# Patient Record
Sex: MALE | Race: WHITE | NOT HISPANIC OR LATINO | Employment: OTHER | RURAL
[De-identification: names, ages, dates, MRNs, and addresses within clinical notes are randomized per-mention and may not be internally consistent; named-entity substitution may affect disease eponyms.]

---

## 2017-09-11 ENCOUNTER — HISTORICAL (OUTPATIENT)
Dept: ADMINISTRATIVE | Facility: HOSPITAL | Age: 77
End: 2017-09-11

## 2017-09-12 LAB
LAB AP CLINICAL INFORMATION: NORMAL
LAB AP COMMENTS: NORMAL
LAB AP DIAGNOSIS - HISTORICAL: NORMAL
LAB AP GROSS PATHOLOGY - HISTORICAL: NORMAL
LAB AP SPECIMEN SUBMITTED - HISTORICAL: NORMAL

## 2020-04-15 ENCOUNTER — HISTORICAL (OUTPATIENT)
Dept: ADMINISTRATIVE | Facility: HOSPITAL | Age: 80
End: 2020-04-15

## 2020-08-24 ENCOUNTER — HISTORICAL (OUTPATIENT)
Dept: ADMINISTRATIVE | Facility: HOSPITAL | Age: 80
End: 2020-08-24

## 2020-08-24 LAB
ANION GAP SERPL CALCULATED.3IONS-SCNC: 9 MMOL/L
ANISOCYTOSIS BLD QL SMEAR: ABNORMAL
APTT PPP: 24.4 SECONDS (ref 25.2–37.3)
BASOPHILS # BLD AUTO: 0.01 X10E3/UL (ref 0–0.2)
BASOPHILS NFR BLD AUTO: 0.2 % (ref 0–1)
BILIRUB UR QL STRIP: NEGATIVE MG/DL
BUN SERPL-MCNC: 28 MG/DL (ref 7–18)
CALCIUM SERPL-MCNC: 9.2 MG/DL (ref 8.5–10.1)
CHLORIDE SERPL-SCNC: 102 MMOL/L (ref 98–107)
CK MB SERPL-MCNC: 1.5 NG/ML (ref 1–3.6)
CK SERPL-CCNC: 73 U/L (ref 39–308)
CLARITY UR: CLEAR
CO2 SERPL-SCNC: 31 MMOL/L (ref 21–32)
COLOR UR: YELLOW
CREAT SERPL-MCNC: 1.39 MG/DL (ref 0.7–1.3)
CRP SERPL-MCNC: 0.5 UG/ML (ref 0–0.8)
CRYSTALS FLD MICRO: ABNORMAL
DACRYOCYTES BLD QL SMEAR: ABNORMAL
EOSINOPHIL # BLD AUTO: 0.16 X10E3/UL (ref 0–0.5)
EOSINOPHIL NFR BLD AUTO: 3.2 % (ref 1–4)
EOSINOPHIL NFR BLD MANUAL: 4 % (ref 1–4)
ERYTHROCYTE [DISTWIDTH] IN BLOOD BY AUTOMATED COUNT: 18.3 % (ref 11.5–14.5)
ERYTHROCYTE [SEDIMENTATION RATE] IN BLOOD BY WESTERGREN METHOD: 48 MM/HR (ref 0–20)
GLUCOSE SERPL-MCNC: 106 MG/DL (ref 74–106)
GLUCOSE UR STRIP-MCNC: NEGATIVE MG/DL
HCT VFR BLD AUTO: 32.5 % (ref 40–54)
HGB BLD-MCNC: 10.6 G/DL (ref 13.5–18)
IMM GRANULOCYTES # BLD AUTO: 0.01 X10E3/UL (ref 0–0.04)
IMM GRANULOCYTES NFR BLD: 0.2 % (ref 0–0.4)
INR BLD: 0.92 (ref 0–3.3)
KETONES UR STRIP-SCNC: NEGATIVE MG/DL
LEUKOCYTE ESTERASE UR QL STRIP: NEGATIVE LEU/UL
LYMPHOCYTES # BLD AUTO: 1.57 X10E3/UL (ref 1–4.8)
LYMPHOCYTES NFR BLD AUTO: 31.7 % (ref 27–41)
LYMPHOCYTES NFR BLD MANUAL: 29 % (ref 27–41)
MAGNESIUM SERPL-MCNC: 1.3 MG/DL (ref 1.7–2.3)
MCH RBC QN AUTO: 33.7 PG (ref 27–31)
MCHC RBC AUTO-ENTMCNC: 32.6 G/DL (ref 32–36)
MCV RBC AUTO: 103.2 FL (ref 80–96)
MONOCYTES # BLD AUTO: 0.44 X10E3/UL (ref 0–0.8)
MONOCYTES NFR BLD AUTO: 8.9 % (ref 2–6)
MONOCYTES NFR BLD MANUAL: 6 % (ref 2–6)
MPC BLD CALC-MCNC: 9.1 FL (ref 9.4–12.4)
MYOGLOBIN SERPL-MCNC: 100 NG/ML (ref 16–116)
NEUTROPHILS # BLD AUTO: 2.77 X10E3/UL (ref 1.8–7.7)
NEUTROPHILS NFR BLD AUTO: 55.8 % (ref 53–65)
NEUTS BAND NFR BLD MANUAL: 1 % (ref 1–5)
NEUTS SEG NFR BLD MANUAL: 60 % (ref 50–62)
NITRITE UR QL STRIP: NEGATIVE
NRBC # BLD AUTO: 0 X10E3/UL (ref 0–0)
NRBC, AUTO (.00): 0 /100 (ref 0–0)
OVALOCYTES BLD QL SMEAR: ABNORMAL
PH UR STRIP: 6 PH UNITS (ref 5–8)
PLATELET # BLD AUTO: 183 X10E3/UL (ref 150–400)
PLATELET MORPHOLOGY: NORMAL
POLYCHROMASIA BLD QL SMEAR: ABNORMAL
POTASSIUM SERPL-SCNC: 3.9 MMOL/L (ref 3.5–5.1)
PROT UR QL STRIP: ABNORMAL MG/DL
PROTHROMBIN TIME: 11.9 SECONDS (ref 11.7–14.7)
RBC # BLD AUTO: 3.15 X10E6/UL (ref 4.6–6.2)
RBC # UR STRIP: NEGATIVE ERY/UL
SODIUM SERPL-SCNC: 138 MMOL/L (ref 136–145)
SP GR UR STRIP: 1.02 (ref 1–1.03)
TROPONIN I SERPL-MCNC: <0.017 NG/ML (ref 0–0.06)
UROBILINOGEN UR STRIP-ACNC: 0.2 EU/DL
WBC # BLD AUTO: 4.96 X10E3/UL (ref 4.5–11)

## 2020-09-10 ENCOUNTER — HISTORICAL (OUTPATIENT)
Dept: ADMINISTRATIVE | Facility: HOSPITAL | Age: 80
End: 2020-09-10

## 2021-05-11 ENCOUNTER — OFFICE VISIT (OUTPATIENT)
Dept: FAMILY MEDICINE | Facility: CLINIC | Age: 81
End: 2021-05-11
Payer: MEDICARE

## 2021-05-11 VITALS
WEIGHT: 216.19 LBS | BODY MASS INDEX: 29.28 KG/M2 | TEMPERATURE: 98 F | SYSTOLIC BLOOD PRESSURE: 135 MMHG | DIASTOLIC BLOOD PRESSURE: 73 MMHG | RESPIRATION RATE: 16 BRPM | HEART RATE: 80 BPM | HEIGHT: 72 IN | OXYGEN SATURATION: 97 %

## 2021-05-11 DIAGNOSIS — H61.23 BILATERAL IMPACTED CERUMEN: ICD-10-CM

## 2021-05-11 DIAGNOSIS — H69.93 EUSTACHIAN TUBE DYSFUNCTION, BILATERAL: Primary | ICD-10-CM

## 2021-05-11 DIAGNOSIS — H91.93 DECREASED HEARING OF BOTH EARS: ICD-10-CM

## 2021-05-11 PROCEDURE — 99213 OFFICE O/P EST LOW 20 MIN: CPT | Mod: 25,,, | Performed by: NURSE PRACTITIONER

## 2021-05-11 PROCEDURE — 69209 REMOVE IMPACTED EAR WAX UNI: CPT | Mod: 50,,, | Performed by: NURSE PRACTITIONER

## 2021-05-11 PROCEDURE — 96372 THER/PROPH/DIAG INJ SC/IM: CPT | Mod: 59,,, | Performed by: NURSE PRACTITIONER

## 2021-05-11 PROCEDURE — 96372 PR INJECTION,THERAP/PROPH/DIAG2ST, IM OR SUBCUT: ICD-10-PCS | Mod: 59,,, | Performed by: NURSE PRACTITIONER

## 2021-05-11 PROCEDURE — 69209 PR REMOVAL IMPACTED CERUMEN USING IRRIGATION/LAVAGE, UNILATERAL: ICD-10-PCS | Mod: 50,,, | Performed by: NURSE PRACTITIONER

## 2021-05-11 PROCEDURE — 99213 PR OFFICE/OUTPT VISIT, EST, LEVL III, 20-29 MIN: ICD-10-PCS | Mod: 25,,, | Performed by: NURSE PRACTITIONER

## 2021-05-11 RX ORDER — METHYLPREDNISOLONE ACETATE 40 MG/ML
40 INJECTION, SUSPENSION INTRA-ARTICULAR; INTRALESIONAL; INTRAMUSCULAR; SOFT TISSUE ONCE
Status: COMPLETED | OUTPATIENT
Start: 2021-05-11 | End: 2021-05-11

## 2021-05-11 RX ORDER — OMEPRAZOLE 20 MG/1
20 CAPSULE, DELAYED RELEASE ORAL
COMMUNITY

## 2021-05-11 RX ORDER — ASPIRIN 81 MG/1
81 TABLET ORAL DAILY
COMMUNITY

## 2021-05-11 RX ORDER — DEXAMETHASONE SODIUM PHOSPHATE 4 MG/ML
4 INJECTION, SOLUTION INTRA-ARTICULAR; INTRALESIONAL; INTRAMUSCULAR; INTRAVENOUS; SOFT TISSUE ONCE
Status: COMPLETED | OUTPATIENT
Start: 2021-05-11 | End: 2021-05-11

## 2021-05-11 RX ORDER — ATORVASTATIN CALCIUM 40 MG/1
40 TABLET, FILM COATED ORAL NIGHTLY
COMMUNITY

## 2021-05-11 RX ADMIN — METHYLPREDNISOLONE ACETATE 40 MG: 40 INJECTION, SUSPENSION INTRA-ARTICULAR; INTRALESIONAL; INTRAMUSCULAR; SOFT TISSUE at 10:05

## 2021-05-11 RX ADMIN — DEXAMETHASONE SODIUM PHOSPHATE 4 MG: 4 INJECTION, SOLUTION INTRA-ARTICULAR; INTRALESIONAL; INTRAMUSCULAR; INTRAVENOUS; SOFT TISSUE at 10:05

## 2021-10-04 ENCOUNTER — OFFICE VISIT (OUTPATIENT)
Dept: FAMILY MEDICINE | Facility: CLINIC | Age: 81
End: 2021-10-04
Payer: MEDICARE

## 2021-10-04 VITALS
DIASTOLIC BLOOD PRESSURE: 70 MMHG | BODY MASS INDEX: 29.8 KG/M2 | WEIGHT: 220 LBS | RESPIRATION RATE: 18 BRPM | HEART RATE: 80 BPM | SYSTOLIC BLOOD PRESSURE: 130 MMHG | HEIGHT: 72 IN

## 2021-10-04 DIAGNOSIS — M65.339 TRIGGER MIDDLE FINGER, UNSPECIFIED LATERALITY: Primary | ICD-10-CM

## 2021-10-04 PROCEDURE — 99213 PR OFFICE/OUTPT VISIT, EST, LEVL III, 20-29 MIN: ICD-10-PCS | Mod: ,,, | Performed by: FAMILY MEDICINE

## 2021-10-04 PROCEDURE — 3075F SYST BP GE 130 - 139MM HG: CPT | Mod: ,,, | Performed by: FAMILY MEDICINE

## 2021-10-04 PROCEDURE — 3288F PR FALLS RISK ASSESSMENT DOCUMENTED: ICD-10-PCS | Mod: ,,, | Performed by: FAMILY MEDICINE

## 2021-10-04 PROCEDURE — 1160F PR REVIEW ALL MEDS BY PRESCRIBER/CLIN PHARMACIST DOCUMENTED: ICD-10-PCS | Mod: ,,, | Performed by: FAMILY MEDICINE

## 2021-10-04 PROCEDURE — 1159F PR MEDICATION LIST DOCUMENTED IN MEDICAL RECORD: ICD-10-PCS | Mod: ,,, | Performed by: FAMILY MEDICINE

## 2021-10-04 PROCEDURE — 99213 OFFICE O/P EST LOW 20 MIN: CPT | Mod: ,,, | Performed by: FAMILY MEDICINE

## 2021-10-04 PROCEDURE — 1159F MED LIST DOCD IN RCRD: CPT | Mod: ,,, | Performed by: FAMILY MEDICINE

## 2021-10-04 PROCEDURE — 1101F PR PT FALLS ASSESS DOC 0-1 FALLS W/OUT INJ PAST YR: ICD-10-PCS | Mod: ,,, | Performed by: FAMILY MEDICINE

## 2021-10-04 PROCEDURE — 3078F PR MOST RECENT DIASTOLIC BLOOD PRESSURE < 80 MM HG: ICD-10-PCS | Mod: ,,, | Performed by: FAMILY MEDICINE

## 2021-10-04 PROCEDURE — 1160F RVW MEDS BY RX/DR IN RCRD: CPT | Mod: ,,, | Performed by: FAMILY MEDICINE

## 2021-10-04 PROCEDURE — 3078F DIAST BP <80 MM HG: CPT | Mod: ,,, | Performed by: FAMILY MEDICINE

## 2021-10-04 PROCEDURE — 3075F PR MOST RECENT SYSTOLIC BLOOD PRESS GE 130-139MM HG: ICD-10-PCS | Mod: ,,, | Performed by: FAMILY MEDICINE

## 2021-10-04 PROCEDURE — 1101F PT FALLS ASSESS-DOCD LE1/YR: CPT | Mod: ,,, | Performed by: FAMILY MEDICINE

## 2021-10-04 PROCEDURE — 3288F FALL RISK ASSESSMENT DOCD: CPT | Mod: ,,, | Performed by: FAMILY MEDICINE

## 2021-10-11 ENCOUNTER — HOSPITAL ENCOUNTER (OUTPATIENT)
Dept: RADIOLOGY | Facility: HOSPITAL | Age: 81
Discharge: HOME | End: 2021-10-11
Attending: ORTHOPAEDIC SURGERY
Payer: MEDICARE

## 2021-10-11 DIAGNOSIS — M79.643 PAIN OF HAND, UNSPECIFIED LATERALITY: ICD-10-CM

## 2021-10-11 PROBLEM — M65.339 TRIGGER MIDDLE FINGER: Status: ACTIVE | Noted: 2021-10-11

## 2021-10-11 PROCEDURE — 73130 X-RAY EXAM OF HAND: CPT | Mod: TC,50

## 2021-11-04 ENCOUNTER — ANESTHESIA (OUTPATIENT)
Dept: SURGERY | Facility: HOSPITAL | Age: 81
End: 2021-11-04
Payer: MEDICARE

## 2021-11-04 ENCOUNTER — HOSPITAL ENCOUNTER (OUTPATIENT)
Facility: HOSPITAL | Age: 81
Discharge: HOME | End: 2021-11-04
Attending: ORTHOPAEDIC SURGERY | Admitting: ORTHOPAEDIC SURGERY
Payer: MEDICARE

## 2021-11-04 ENCOUNTER — ANESTHESIA EVENT (OUTPATIENT)
Dept: SURGERY | Facility: HOSPITAL | Age: 81
End: 2021-11-04
Payer: MEDICARE

## 2021-11-04 VITALS
HEART RATE: 68 BPM | DIASTOLIC BLOOD PRESSURE: 59 MMHG | RESPIRATION RATE: 20 BRPM | WEIGHT: 224 LBS | SYSTOLIC BLOOD PRESSURE: 121 MMHG | BODY MASS INDEX: 30.34 KG/M2 | OXYGEN SATURATION: 93 % | HEIGHT: 72 IN | TEMPERATURE: 98 F

## 2021-11-04 DIAGNOSIS — I10 HYPERTENSION: ICD-10-CM

## 2021-11-04 DIAGNOSIS — M65.331 TRIGGER FINGER, RIGHT MIDDLE FINGER: Primary | ICD-10-CM

## 2021-11-04 PROCEDURE — 36000707: Performed by: ORTHOPAEDIC SURGERY

## 2021-11-04 PROCEDURE — 37000009 HC ANESTHESIA EA ADD 15 MINS: Performed by: ORTHOPAEDIC SURGERY

## 2021-11-04 PROCEDURE — 36000706: Performed by: ORTHOPAEDIC SURGERY

## 2021-11-04 PROCEDURE — 27000655: Performed by: ANESTHESIOLOGY

## 2021-11-04 PROCEDURE — 27000284 HC CANNULA NASAL: Performed by: ANESTHESIOLOGY

## 2021-11-04 PROCEDURE — 71000015 HC POSTOP RECOV 1ST HR: Performed by: ORTHOPAEDIC SURGERY

## 2021-11-04 PROCEDURE — 27201423 OPTIME MED/SURG SUP & DEVICES STERILE SUPPLY: Performed by: ORTHOPAEDIC SURGERY

## 2021-11-04 PROCEDURE — D9220A PRA ANESTHESIA: ICD-10-PCS | Mod: ANES,,, | Performed by: ANESTHESIOLOGY

## 2021-11-04 PROCEDURE — 25000003 PHARM REV CODE 250: Performed by: ORTHOPAEDIC SURGERY

## 2021-11-04 PROCEDURE — 25000003 PHARM REV CODE 250: Performed by: NURSE ANESTHETIST, CERTIFIED REGISTERED

## 2021-11-04 PROCEDURE — 93010 ELECTROCARDIOGRAM REPORT: CPT | Mod: ,,, | Performed by: HOSPITALIST

## 2021-11-04 PROCEDURE — D9220A PRA ANESTHESIA: Mod: CRNA,,, | Performed by: NURSE ANESTHETIST, CERTIFIED REGISTERED

## 2021-11-04 PROCEDURE — 71000033 HC RECOVERY, INTIAL HOUR: Performed by: ORTHOPAEDIC SURGERY

## 2021-11-04 PROCEDURE — 93010 EKG 12-LEAD: ICD-10-PCS | Mod: ,,, | Performed by: HOSPITALIST

## 2021-11-04 PROCEDURE — 37000008 HC ANESTHESIA 1ST 15 MINUTES: Performed by: ORTHOPAEDIC SURGERY

## 2021-11-04 PROCEDURE — 27100168 OPTIME MED/SURG SUP & DEVICES NON-STERILE SUPPLY: Performed by: ORTHOPAEDIC SURGERY

## 2021-11-04 PROCEDURE — D9220A PRA ANESTHESIA: Mod: ANES,,, | Performed by: ANESTHESIOLOGY

## 2021-11-04 PROCEDURE — 27000716 HC OXISENSOR PROBE, ANY SIZE: Performed by: ANESTHESIOLOGY

## 2021-11-04 PROCEDURE — 63600175 PHARM REV CODE 636 W HCPCS: Performed by: NURSE ANESTHETIST, CERTIFIED REGISTERED

## 2021-11-04 PROCEDURE — D9220A PRA ANESTHESIA: ICD-10-PCS | Mod: CRNA,,, | Performed by: NURSE ANESTHETIST, CERTIFIED REGISTERED

## 2021-11-04 PROCEDURE — 93005 ELECTROCARDIOGRAM TRACING: CPT

## 2021-11-04 RX ORDER — PROMETHAZINE HYDROCHLORIDE 25 MG/1
25 TABLET ORAL EVERY 6 HOURS PRN
Status: DISCONTINUED | OUTPATIENT
Start: 2021-11-04 | End: 2021-11-04 | Stop reason: HOSPADM

## 2021-11-04 RX ORDER — DIPHENHYDRAMINE HYDROCHLORIDE 50 MG/ML
25 INJECTION INTRAMUSCULAR; INTRAVENOUS EVERY 6 HOURS PRN
Status: DISCONTINUED | OUTPATIENT
Start: 2021-11-04 | End: 2021-11-04 | Stop reason: HOSPADM

## 2021-11-04 RX ORDER — PROPOFOL 10 MG/ML
VIAL (ML) INTRAVENOUS
Status: DISCONTINUED | OUTPATIENT
Start: 2021-11-04 | End: 2021-11-04

## 2021-11-04 RX ORDER — ONDANSETRON 2 MG/ML
INJECTION INTRAMUSCULAR; INTRAVENOUS
Status: DISCONTINUED | OUTPATIENT
Start: 2021-11-04 | End: 2021-11-04

## 2021-11-04 RX ORDER — HYDROCODONE BITARTRATE AND ACETAMINOPHEN 10; 325 MG/1; MG/1
1 TABLET ORAL EVERY 4 HOURS PRN
Status: DISCONTINUED | OUTPATIENT
Start: 2021-11-04 | End: 2021-11-04 | Stop reason: HOSPADM

## 2021-11-04 RX ORDER — CEFAZOLIN SODIUM 2 G/50ML
2 SOLUTION INTRAVENOUS
Status: DISCONTINUED | OUTPATIENT
Start: 2021-11-04 | End: 2021-11-04 | Stop reason: HOSPADM

## 2021-11-04 RX ORDER — HYDROCODONE BITARTRATE AND ACETAMINOPHEN 5; 325 MG/1; MG/1
1 TABLET ORAL EVERY 4 HOURS PRN
Status: DISCONTINUED | OUTPATIENT
Start: 2021-11-04 | End: 2021-11-04 | Stop reason: HOSPADM

## 2021-11-04 RX ORDER — HYDROMORPHONE HYDROCHLORIDE 2 MG/ML
0.5 INJECTION, SOLUTION INTRAMUSCULAR; INTRAVENOUS; SUBCUTANEOUS EVERY 5 MIN PRN
Status: DISCONTINUED | OUTPATIENT
Start: 2021-11-04 | End: 2021-11-04 | Stop reason: HOSPADM

## 2021-11-04 RX ORDER — FENTANYL CITRATE 50 UG/ML
INJECTION, SOLUTION INTRAMUSCULAR; INTRAVENOUS
Status: DISCONTINUED | OUTPATIENT
Start: 2021-11-04 | End: 2021-11-04

## 2021-11-04 RX ORDER — BUPIVACAINE HYDROCHLORIDE 2.5 MG/ML
INJECTION, SOLUTION EPIDURAL; INFILTRATION; INTRACAUDAL
Status: DISCONTINUED | OUTPATIENT
Start: 2021-11-04 | End: 2021-11-04 | Stop reason: HOSPADM

## 2021-11-04 RX ORDER — ONDANSETRON 4 MG/1
8 TABLET, ORALLY DISINTEGRATING ORAL EVERY 8 HOURS PRN
Status: DISCONTINUED | OUTPATIENT
Start: 2021-11-04 | End: 2021-11-04 | Stop reason: HOSPADM

## 2021-11-04 RX ORDER — MORPHINE SULFATE 10 MG/ML
4 INJECTION INTRAMUSCULAR; INTRAVENOUS; SUBCUTANEOUS EVERY 5 MIN PRN
Status: DISCONTINUED | OUTPATIENT
Start: 2021-11-04 | End: 2021-11-04 | Stop reason: HOSPADM

## 2021-11-04 RX ORDER — ONDANSETRON 2 MG/ML
4 INJECTION INTRAMUSCULAR; INTRAVENOUS DAILY PRN
Status: DISCONTINUED | OUTPATIENT
Start: 2021-11-04 | End: 2021-11-04 | Stop reason: HOSPADM

## 2021-11-04 RX ORDER — MEPERIDINE HYDROCHLORIDE 25 MG/ML
25 INJECTION INTRAMUSCULAR; INTRAVENOUS; SUBCUTANEOUS EVERY 10 MIN PRN
Status: DISCONTINUED | OUTPATIENT
Start: 2021-11-04 | End: 2021-11-04 | Stop reason: HOSPADM

## 2021-11-04 RX ORDER — HYDROCODONE BITARTRATE AND ACETAMINOPHEN 5; 325 MG/1; MG/1
1 TABLET ORAL EVERY 6 HOURS PRN
Qty: 28 TABLET | Refills: 0 | Status: SHIPPED | OUTPATIENT
Start: 2021-11-04 | End: 2021-11-11

## 2021-11-04 RX ORDER — LIDOCAINE HYDROCHLORIDE 20 MG/ML
INJECTION, SOLUTION EPIDURAL; INFILTRATION; INTRACAUDAL; PERINEURAL
Status: DISCONTINUED | OUTPATIENT
Start: 2021-11-04 | End: 2021-11-04

## 2021-11-04 RX ORDER — ACETAMINOPHEN 500 MG
1000 TABLET ORAL EVERY 6 HOURS PRN
Status: DISCONTINUED | OUTPATIENT
Start: 2021-11-04 | End: 2021-11-04 | Stop reason: HOSPADM

## 2021-11-04 RX ORDER — UMECLIDINIUM BROMIDE AND VILANTEROL TRIFENATATE 62.5; 25 UG/1; UG/1
POWDER RESPIRATORY (INHALATION)
COMMUNITY

## 2021-11-04 RX ORDER — SODIUM CHLORIDE 9 MG/ML
INJECTION, SOLUTION INTRAVENOUS CONTINUOUS
Status: DISCONTINUED | OUTPATIENT
Start: 2021-11-04 | End: 2021-11-04 | Stop reason: HOSPADM

## 2021-11-04 RX ORDER — LIDOCAINE HYDROCHLORIDE 5 MG/ML
INJECTION, SOLUTION INFILTRATION; INTRAVENOUS
Status: DISCONTINUED | OUTPATIENT
Start: 2021-11-04 | End: 2021-11-04

## 2021-11-04 RX ORDER — CEFAZOLIN SODIUM 1 G/3ML
INJECTION, POWDER, FOR SOLUTION INTRAMUSCULAR; INTRAVENOUS
Status: DISCONTINUED | OUTPATIENT
Start: 2021-11-04 | End: 2021-11-04

## 2021-11-04 RX ADMIN — LIDOCAINE HYDROCHLORIDE 50 MG: 20 INJECTION, SOLUTION EPIDURAL; INFILTRATION; INTRACAUDAL; PERINEURAL at 08:11

## 2021-11-04 RX ADMIN — FENTANYL CITRATE 100 MCG: 50 INJECTION INTRAMUSCULAR; INTRAVENOUS at 08:11

## 2021-11-04 RX ADMIN — PROPOFOL 50 MG: 10 INJECTION, EMULSION INTRAVENOUS at 08:11

## 2021-11-04 RX ADMIN — LIDOCAINE HYDROCHLORIDE 50 ML: 5 INJECTION, SOLUTION INFILTRATION; INTRAVENOUS at 08:11

## 2021-11-04 RX ADMIN — SODIUM CHLORIDE: 9 INJECTION, SOLUTION INTRAVENOUS at 06:11

## 2021-11-04 RX ADMIN — CEFAZOLIN 2 G: 1 INJECTION, POWDER, FOR SOLUTION INTRAMUSCULAR; INTRAVENOUS; PARENTERAL at 08:11

## 2021-11-04 RX ADMIN — ONDANSETRON 8 MG: 2 INJECTION INTRAMUSCULAR; INTRAVENOUS at 08:11

## 2021-11-16 ENCOUNTER — OFFICE VISIT (OUTPATIENT)
Dept: FAMILY MEDICINE | Facility: CLINIC | Age: 81
End: 2021-11-16
Payer: MEDICARE

## 2021-11-16 VITALS
RESPIRATION RATE: 18 BRPM | DIASTOLIC BLOOD PRESSURE: 64 MMHG | HEIGHT: 72 IN | HEART RATE: 84 BPM | SYSTOLIC BLOOD PRESSURE: 118 MMHG | WEIGHT: 222.19 LBS | BODY MASS INDEX: 30.09 KG/M2

## 2021-11-16 DIAGNOSIS — T78.40XA ALLERGIC REACTION, INITIAL ENCOUNTER: Primary | ICD-10-CM

## 2021-11-16 PROCEDURE — 1101F PT FALLS ASSESS-DOCD LE1/YR: CPT | Mod: ,,, | Performed by: FAMILY MEDICINE

## 2021-11-16 PROCEDURE — 3074F SYST BP LT 130 MM HG: CPT | Mod: ,,, | Performed by: FAMILY MEDICINE

## 2021-11-16 PROCEDURE — 3288F FALL RISK ASSESSMENT DOCD: CPT | Mod: ,,, | Performed by: FAMILY MEDICINE

## 2021-11-16 PROCEDURE — 96372 THER/PROPH/DIAG INJ SC/IM: CPT | Mod: ,,, | Performed by: FAMILY MEDICINE

## 2021-11-16 PROCEDURE — 3074F PR MOST RECENT SYSTOLIC BLOOD PRESSURE < 130 MM HG: ICD-10-PCS | Mod: ,,, | Performed by: FAMILY MEDICINE

## 2021-11-16 PROCEDURE — 99213 PR OFFICE/OUTPT VISIT, EST, LEVL III, 20-29 MIN: ICD-10-PCS | Mod: 25,,, | Performed by: FAMILY MEDICINE

## 2021-11-16 PROCEDURE — 1101F PR PT FALLS ASSESS DOC 0-1 FALLS W/OUT INJ PAST YR: ICD-10-PCS | Mod: ,,, | Performed by: FAMILY MEDICINE

## 2021-11-16 PROCEDURE — 99213 OFFICE O/P EST LOW 20 MIN: CPT | Mod: 25,,, | Performed by: FAMILY MEDICINE

## 2021-11-16 PROCEDURE — 96372 PR INJECTION,THERAP/PROPH/DIAG2ST, IM OR SUBCUT: ICD-10-PCS | Mod: ,,, | Performed by: FAMILY MEDICINE

## 2021-11-16 PROCEDURE — 3078F PR MOST RECENT DIASTOLIC BLOOD PRESSURE < 80 MM HG: ICD-10-PCS | Mod: ,,, | Performed by: FAMILY MEDICINE

## 2021-11-16 PROCEDURE — 1159F PR MEDICATION LIST DOCUMENTED IN MEDICAL RECORD: ICD-10-PCS | Mod: ,,, | Performed by: FAMILY MEDICINE

## 2021-11-16 PROCEDURE — 1159F MED LIST DOCD IN RCRD: CPT | Mod: ,,, | Performed by: FAMILY MEDICINE

## 2021-11-16 PROCEDURE — 3078F DIAST BP <80 MM HG: CPT | Mod: ,,, | Performed by: FAMILY MEDICINE

## 2021-11-16 PROCEDURE — 3288F PR FALLS RISK ASSESSMENT DOCUMENTED: ICD-10-PCS | Mod: ,,, | Performed by: FAMILY MEDICINE

## 2021-11-16 RX ORDER — DEXAMETHASONE SODIUM PHOSPHATE 4 MG/ML
8 INJECTION, SOLUTION INTRA-ARTICULAR; INTRALESIONAL; INTRAMUSCULAR; INTRAVENOUS; SOFT TISSUE
Status: COMPLETED | OUTPATIENT
Start: 2021-11-16 | End: 2021-11-16

## 2021-11-16 RX ORDER — HYDROCODONE BITARTRATE AND ACETAMINOPHEN 7.5; 3 MG/1; MG/1
TABLET ORAL
COMMUNITY
End: 2023-02-27 | Stop reason: SDUPTHER

## 2021-11-16 RX ADMIN — DEXAMETHASONE SODIUM PHOSPHATE 8 MG: 4 INJECTION, SOLUTION INTRA-ARTICULAR; INTRALESIONAL; INTRAMUSCULAR; INTRAVENOUS; SOFT TISSUE at 11:11

## 2021-11-17 PROBLEM — Z09 POSTOPERATIVE FOLLOW-UP: Status: ACTIVE | Noted: 2021-11-17

## 2022-02-21 PROBLEM — Z09 POSTOPERATIVE FOLLOW-UP: Status: RESOLVED | Noted: 2021-11-17 | Resolved: 2022-02-21

## 2022-03-08 ENCOUNTER — OFFICE VISIT (OUTPATIENT)
Dept: FAMILY MEDICINE | Facility: CLINIC | Age: 82
End: 2022-03-08
Payer: MEDICARE

## 2022-03-08 VITALS
BODY MASS INDEX: 30.15 KG/M2 | HEART RATE: 82 BPM | WEIGHT: 222.63 LBS | HEIGHT: 72 IN | SYSTOLIC BLOOD PRESSURE: 110 MMHG | TEMPERATURE: 98 F | DIASTOLIC BLOOD PRESSURE: 60 MMHG | RESPIRATION RATE: 18 BRPM

## 2022-03-08 DIAGNOSIS — Z86.010 HISTORY OF COLON POLYPS: Primary | ICD-10-CM

## 2022-03-08 PROCEDURE — 1159F PR MEDICATION LIST DOCUMENTED IN MEDICAL RECORD: ICD-10-PCS | Mod: ,,, | Performed by: FAMILY MEDICINE

## 2022-03-08 PROCEDURE — 1101F PT FALLS ASSESS-DOCD LE1/YR: CPT | Mod: ,,, | Performed by: FAMILY MEDICINE

## 2022-03-08 PROCEDURE — 3078F PR MOST RECENT DIASTOLIC BLOOD PRESSURE < 80 MM HG: ICD-10-PCS | Mod: ,,, | Performed by: FAMILY MEDICINE

## 2022-03-08 PROCEDURE — 1125F PR PAIN SEVERITY QUANTIFIED, PAIN PRESENT: ICD-10-PCS | Mod: ,,, | Performed by: FAMILY MEDICINE

## 2022-03-08 PROCEDURE — 99212 PR OFFICE/OUTPT VISIT, EST, LEVL II, 10-19 MIN: ICD-10-PCS | Mod: ,,, | Performed by: FAMILY MEDICINE

## 2022-03-08 PROCEDURE — 99212 OFFICE O/P EST SF 10 MIN: CPT | Mod: ,,, | Performed by: FAMILY MEDICINE

## 2022-03-08 PROCEDURE — 3074F SYST BP LT 130 MM HG: CPT | Mod: ,,, | Performed by: FAMILY MEDICINE

## 2022-03-08 PROCEDURE — 3074F PR MOST RECENT SYSTOLIC BLOOD PRESSURE < 130 MM HG: ICD-10-PCS | Mod: ,,, | Performed by: FAMILY MEDICINE

## 2022-03-08 PROCEDURE — 1160F PR REVIEW ALL MEDS BY PRESCRIBER/CLIN PHARMACIST DOCUMENTED: ICD-10-PCS | Mod: ,,, | Performed by: FAMILY MEDICINE

## 2022-03-08 PROCEDURE — 1101F PR PT FALLS ASSESS DOC 0-1 FALLS W/OUT INJ PAST YR: ICD-10-PCS | Mod: ,,, | Performed by: FAMILY MEDICINE

## 2022-03-08 PROCEDURE — 1159F MED LIST DOCD IN RCRD: CPT | Mod: ,,, | Performed by: FAMILY MEDICINE

## 2022-03-08 PROCEDURE — 3288F PR FALLS RISK ASSESSMENT DOCUMENTED: ICD-10-PCS | Mod: ,,, | Performed by: FAMILY MEDICINE

## 2022-03-08 PROCEDURE — 1125F AMNT PAIN NOTED PAIN PRSNT: CPT | Mod: ,,, | Performed by: FAMILY MEDICINE

## 2022-03-08 PROCEDURE — 3288F FALL RISK ASSESSMENT DOCD: CPT | Mod: ,,, | Performed by: FAMILY MEDICINE

## 2022-03-08 PROCEDURE — 3078F DIAST BP <80 MM HG: CPT | Mod: ,,, | Performed by: FAMILY MEDICINE

## 2022-03-08 PROCEDURE — 1160F RVW MEDS BY RX/DR IN RCRD: CPT | Mod: ,,, | Performed by: FAMILY MEDICINE

## 2022-03-08 NOTE — PROGRESS NOTES
Jayant Castro MD        PATIENT NAME: Nathan Hinton  : 1940  DATE: 3/8/22  MRN: [id number]      Billing Provider: Jayant Castro MD  Level of Service: [place] OFFICE/OUTPT VISIT, EST, LEVL II, 10-19 MIN  Patient PCP Information     Provider PCP Type    Jayant Castro MD General          Reason for Visit / Chief Complaint: Referral (For Colonoscopy)       Update PCP  Update Chief Complaint         History of Present Illness / Problem Focused Workflow     Nathan Hinton presents to the clinic with Referral (For Colonoscopy)     Pt wants referral for colonscopy.        Review of Systems     Review of Systems   Constitutional: Negative for activity change, appetite change, fever and unexpected weight change.   HENT: Negative for congestion, rhinorrhea, sinus pressure, sinus pain, sore throat and trouble swallowing.    Eyes: Negative for photophobia, pain, discharge and visual disturbance.   Respiratory: Negative for cough, chest tightness, wheezing and stridor.    Cardiovascular: Negative for chest pain, palpitations and leg swelling.   Gastrointestinal: Negative for abdominal pain, blood in stool, constipation, diarrhea and nausea.   Endocrine: Negative for polydipsia, polyphagia and polyuria.   Genitourinary: Negative for difficulty urinating, flank pain and hematuria.   Musculoskeletal: Negative for arthralgias and neck pain.   Skin: Negative for rash.   Allergic/Immunologic: Negative for food allergies.   Neurological: Negative for dizziness, tremors, seizures, syncope, weakness (global weakness) and headaches.   Psychiatric/Behavioral: Negative for behavioral problems, confusion, decreased concentration, dysphoric mood and hallucinations. The patient is not nervous/anxious.         Medical / Social / Family History     Past Medical History:   Diagnosis Date    #[id number]     COPD (chronic obstructive pulmonary disease)     Hypertension     Sarcoma        Past Surgical History:   Procedure Laterality Date     ABDOMINAL AORTIC ANEURYSM REPAIR      BACK SURGERY      FLEXOR TENDON REPAIR Right 11/4/2021    Procedure: REPAIR, TENDON, FLEXOR;  Surgeon: Dani Degroot MD;  Location: [facility];  Service: Orthopedics;  Laterality: Right;    SURGICAL REMOVAL OF SARCOMA      TRIGGER FINGER RELEASE Right 11/4/2021    Procedure: RELEASE, TRIGGER FINGER;  Surgeon: Dani Degroot MD;  Location: [facility] OR;  Service: Orthopedics;  Laterality: Right;    VASCULAR SURGERY         Social History    reports that he has quit smoking. His smokeless tobacco use includes chew. He reports current alcohol use. He reports that he does not use drugs.    Family History  's family history is not on file.    Medications and Allergies     Medications  Outpatient Medications Marked as Taking for the 3/8/22 encounter (Office Visit) with Jayant Castro MD   Medication Sig Dispense Refill    amlodipine besylate (AMLODIPINE ORAL) Take 5 mg by mouth Daily.      aspirin (ECOTRIN) 81 MG EC tablet Take 81 mg by mouth once daily.      atorvastatin (LIPITOR) 80 MG tablet Take 80 mg by mouth.      HYDROcodone-acetaminophen 7.5-300 mg Tab Take by mouth.      multivit-min/ferrous fumarate (MULTI VITAMIN ORAL) Take by mouth.      omeprazole (PRILOSEC) 20 MG capsule Take 20 mg by mouth.      umeclidinium-vilanteroL (ANORO ELLIPTA) 62.5-25 mcg/actuation DsDv Inhale into the lungs. Controller         Allergies  Review of patient's allergies indicates:   Allergen Reactions    Sulfa (sulfonamide antibiotics) Hives       Physical Examination     Vitals:    03/08/22 0937   BP: 110/60   Pulse: 82   Resp: 18   Temp: 97.5 °F (36.4 °C)     Physical Exam  Constitutional:       General: He is not in acute distress.     Appearance: Normal appearance.   HENT:      Head: Normocephalic.      Right Ear: Tympanic membrane and ear canal normal.      Left Ear: Tympanic membrane and ear canal normal.      Nose: Nose normal.       Mouth/Throat:      Mouth: Mucous membranes are moist.      Pharynx: No oropharyngeal exudate.   Eyes:      Extraocular Movements: Extraocular movements intact.      Pupils: Pupils are equal, round, and reactive to light.   Cardiovascular:      Rate and Rhythm: Normal rate and regular rhythm.      Heart sounds: No murmur heard.  Pulmonary:      Effort: Pulmonary effort is normal.      Breath sounds: Normal breath sounds. No wheezing.   Abdominal:      General: Abdomen is flat. Bowel sounds are normal.      Palpations: Abdomen is soft.      Hernia: No hernia is present.   Musculoskeletal:         General: Normal range of motion.      Cervical back: Normal range of motion and neck supple.      Right lower leg: No edema.      Left lower leg: No edema.   Lymphadenopathy:      Cervical: No cervical adenopathy.   Skin:     General: Skin is warm and dry.      Coloration: Skin is not jaundiced.      Findings: No lesion.   Neurological:      General: No focal deficit present.      Mental Status: He is alert and oriented to person, place, and time.      Cranial Nerves: No cranial nerve deficit.      Gait: Gait normal.   Psychiatric:         Mood and Affect: Mood normal.         Behavior: Behavior normal.         Judgment: Judgment normal.          Assessment and Plan (including Health Maintenance)      Problem List  Smart [employer]  Document Outside HM   :    Plan:   History of colon polyps  -     Ambulatory referral/consult to Gastroenterology; Future; Expected date: 03/22/2022           Health Maintenance Due   Topic Date Due    Lipid Panel  Never done    TETANUS VACCINE  Never done    Shingles Vaccine (1 of 2) Never done    Pneumococcal Vaccines (Age 65+) (1 of 1 - PPSV23) Never done       Problem List Items Addressed This Visit    None     Visit Diagnoses     History of colon polyps    -  Primary    Relevant Orders    Ambulatory referral/consult to Gastroenterology          The patient has no Health Maintenance topics of  status Not Due    No future appointments.     There are no Patient Instructions on file for this visit.  Follow up if symptoms worsen or fail to improve.     Signature:  Jayant Castro MD      Date of encounter: 3/8/22

## 2022-04-13 ENCOUNTER — OFFICE VISIT (OUTPATIENT)
Dept: FAMILY MEDICINE | Facility: CLINIC | Age: 82
End: 2022-04-13
Payer: MEDICARE

## 2022-04-13 VITALS
SYSTOLIC BLOOD PRESSURE: 120 MMHG | WEIGHT: 219 LBS | OXYGEN SATURATION: 89 % | HEART RATE: 86 BPM | HEIGHT: 72 IN | DIASTOLIC BLOOD PRESSURE: 72 MMHG | TEMPERATURE: 99 F | BODY MASS INDEX: 29.66 KG/M2 | RESPIRATION RATE: 16 BRPM

## 2022-04-13 DIAGNOSIS — J40 BRONCHITIS: Primary | ICD-10-CM

## 2022-04-13 PROCEDURE — 1126F AMNT PAIN NOTED NONE PRSNT: CPT | Mod: ,,, | Performed by: FAMILY MEDICINE

## 2022-04-13 PROCEDURE — 99213 PR OFFICE/OUTPT VISIT, EST, LEVL III, 20-29 MIN: ICD-10-PCS | Mod: 25,,, | Performed by: FAMILY MEDICINE

## 2022-04-13 PROCEDURE — 99213 OFFICE O/P EST LOW 20 MIN: CPT | Mod: 25,,, | Performed by: FAMILY MEDICINE

## 2022-04-13 PROCEDURE — 3288F FALL RISK ASSESSMENT DOCD: CPT | Mod: ,,, | Performed by: FAMILY MEDICINE

## 2022-04-13 PROCEDURE — 1101F PR PT FALLS ASSESS DOC 0-1 FALLS W/OUT INJ PAST YR: ICD-10-PCS | Mod: ,,, | Performed by: FAMILY MEDICINE

## 2022-04-13 PROCEDURE — 3074F SYST BP LT 130 MM HG: CPT | Mod: ,,, | Performed by: FAMILY MEDICINE

## 2022-04-13 PROCEDURE — 1101F PT FALLS ASSESS-DOCD LE1/YR: CPT | Mod: ,,, | Performed by: FAMILY MEDICINE

## 2022-04-13 PROCEDURE — 3288F PR FALLS RISK ASSESSMENT DOCUMENTED: ICD-10-PCS | Mod: ,,, | Performed by: FAMILY MEDICINE

## 2022-04-13 PROCEDURE — 96372 THER/PROPH/DIAG INJ SC/IM: CPT | Mod: ,,, | Performed by: FAMILY MEDICINE

## 2022-04-13 PROCEDURE — 3078F DIAST BP <80 MM HG: CPT | Mod: ,,, | Performed by: FAMILY MEDICINE

## 2022-04-13 PROCEDURE — 3074F PR MOST RECENT SYSTOLIC BLOOD PRESSURE < 130 MM HG: ICD-10-PCS | Mod: ,,, | Performed by: FAMILY MEDICINE

## 2022-04-13 PROCEDURE — 3078F PR MOST RECENT DIASTOLIC BLOOD PRESSURE < 80 MM HG: ICD-10-PCS | Mod: ,,, | Performed by: FAMILY MEDICINE

## 2022-04-13 PROCEDURE — 1126F PR PAIN SEVERITY QUANTIFIED, NO PAIN PRESENT: ICD-10-PCS | Mod: ,,, | Performed by: FAMILY MEDICINE

## 2022-04-13 PROCEDURE — 96372 PR INJECTION,THERAP/PROPH/DIAG2ST, IM OR SUBCUT: ICD-10-PCS | Mod: ,,, | Performed by: FAMILY MEDICINE

## 2022-04-13 RX ORDER — METHYLPREDNISOLONE ACETATE 80 MG/ML
80 INJECTION, SUSPENSION INTRA-ARTICULAR; INTRALESIONAL; INTRAMUSCULAR; SOFT TISSUE
Status: COMPLETED | OUTPATIENT
Start: 2022-04-13 | End: 2022-04-13

## 2022-04-13 RX ORDER — PREDNISONE 20 MG/1
20 TABLET ORAL 2 TIMES DAILY
Qty: 10 TABLET | Refills: 0 | OUTPATIENT
Start: 2022-04-13 | End: 2022-08-24

## 2022-04-13 RX ORDER — LEVOFLOXACIN 250 MG/1
TABLET ORAL
Qty: 11 TABLET | Refills: 0 | Status: SHIPPED | OUTPATIENT
Start: 2022-04-13 | End: 2022-05-06 | Stop reason: SDUPTHER

## 2022-04-13 RX ADMIN — METHYLPREDNISOLONE ACETATE 60 MG: 80 INJECTION, SUSPENSION INTRA-ARTICULAR; INTRALESIONAL; INTRAMUSCULAR; SOFT TISSUE at 09:04

## 2022-04-13 NOTE — PROGRESS NOTES
Jayant Castro MD        PATIENT NAME: Nathan Hinton  : 1940  DATE: 22  MRN: [id number]      Billing Provider: Jayant Castro MD  Level of Service: [place] OFFICE/OUTPT VISIT, EST, LEVL III, 20-29 MIN  Patient PCP Information     Provider PCP Type    Jayant Castro MD General          Reason for Visit / Chief Complaint: URI (Cough congestion started yesterday)       Update PCP  Update Chief Complaint         History of Present Illness / Problem Focused Workflow     Nathan Hinton presents to the clinic with URI (Cough congestion started yesterday)     Cough and fever x two days.      URI   Associated symptoms include congestion and coughing. Pertinent negatives include no abdominal pain, chest pain, diarrhea, headaches, nausea, neck pain, rash, rhinorrhea, sinus pain, sore throat or wheezing.       Review of Systems     Review of Systems   Constitutional: Negative for activity change, appetite change, fever and unexpected weight change.   HENT: Positive for congestion. Negative for rhinorrhea, sinus pressure, sinus pain, sore throat and trouble swallowing.    Eyes: Negative for photophobia, pain, discharge and visual disturbance.   Respiratory: Positive for cough. Negative for chest tightness, wheezing and stridor.    Cardiovascular: Negative for chest pain, palpitations and leg swelling.   Gastrointestinal: Negative for abdominal pain, blood in stool, constipation, diarrhea and nausea.   Endocrine: Negative for polydipsia, polyphagia and polyuria.   Genitourinary: Negative for difficulty urinating, flank pain and hematuria.   Musculoskeletal: Negative for arthralgias and neck pain.   Skin: Negative for rash.   Allergic/Immunologic: Negative for food allergies.   Neurological: Negative for dizziness, tremors, seizures, syncope, weakness (global weakness) and headaches.   Psychiatric/Behavioral: Negative for behavioral problems, confusion, decreased concentration, dysphoric mood and hallucinations. The  patient is not nervous/anxious.         Medical / Social / Family History     Past Medical History:   Diagnosis Date    #[id number]     COPD (chronic obstructive pulmonary disease)     Hypertension     Sarcoma        Past Surgical History:   Procedure Laterality Date    ABDOMINAL AORTIC ANEURYSM REPAIR      BACK SURGERY      FLEXOR TENDON REPAIR Right 11/4/2021    Procedure: REPAIR, TENDON, FLEXOR;  Surgeon: Dani Degroot MD;  Location: [facility];  Service: Orthopedics;  Laterality: Right;    SURGICAL REMOVAL OF SARCOMA      TRIGGER FINGER RELEASE Right 11/4/2021    Procedure: RELEASE, TRIGGER FINGER;  Surgeon: Dani Degroot MD;  Location: [facility];  Service: Orthopedics;  Laterality: Right;    VASCULAR SURGERY         Social History    reports that he has quit smoking. His smokeless tobacco use includes chew. He reports current alcohol use. He reports that he does not use drugs.    Family History  's family history is not on file.    Medications and Allergies     Medications  No outpatient medications have been marked as taking for the 4/13/22 encounter (Office Visit) with Jayant Castro MD.       Allergies  Review of patient's allergies indicates:   Allergen Reactions    Sulfa (sulfonamide antibiotics) Hives       Physical Examination     Vitals:    04/13/22 0858   BP: 120/72   Pulse: 86   Resp: 16   Temp: 99.2 °F (37.3 °C)     Physical Exam  Constitutional:       General: He is not in acute distress.     Appearance: Normal appearance.   HENT:      Head: Normocephalic.      Right Ear: Tympanic membrane and ear canal normal.      Left Ear: Tympanic membrane and ear canal normal.      Nose: Nose normal.      Mouth/Throat:      Mouth: Mucous membranes are moist.      Pharynx: No oropharyngeal exudate.   Eyes:      Extraocular Movements: Extraocular movements intact.      Pupils: Pupils are equal, round, and reactive to light.   Cardiovascular:      Rate and Rhythm: Normal  rate and regular rhythm.      Heart sounds: No murmur heard.  Pulmonary:      Effort: Pulmonary effort is normal.      Breath sounds: Rhonchi present. No wheezing.   Abdominal:      General: Abdomen is flat. Bowel sounds are normal.      Palpations: Abdomen is soft.      Hernia: No hernia is present.   Musculoskeletal:         General: Normal range of motion.      Cervical back: Normal range of motion and neck supple.      Right lower leg: No edema.      Left lower leg: No edema.   Lymphadenopathy:      Cervical: No cervical adenopathy.   Skin:     General: Skin is warm and dry.      Coloration: Skin is not jaundiced.      Findings: No lesion.   Neurological:      General: No focal deficit present.      Mental Status: He is alert and oriented to person, place, and time.      Cranial Nerves: No cranial nerve deficit.      Gait: Gait normal.   Psychiatric:         Mood and Affect: Mood normal.         Behavior: Behavior normal.         Judgment: Judgment normal.          Assessment and Plan (including Health Maintenance)      Problem List  Smart [employer]  Document Outside HM   :    Plan:   Bronchitis  -     methylPREDNISolone acetate injection 80 mg  -     levoFLOXacin (LEVAQUIN) 250 MG tablet; 2 tabs po now then 1 tab po q day x 9 days  Dispense: 11 tablet; Refill: 0  -     predniSONE (DELTASONE) 20 MG tablet; Take 1 tablet (20 mg total) by mouth 2 (two) times daily.  Dispense: 10 tablet; Refill: 0           Health Maintenance Due   Topic Date Due    Lipid Panel  Never done    TETANUS VACCINE  Never done    Shingles Vaccine (1 of 2) Never done    Pneumococcal Vaccines (Age 65+) (1 - PCV) Never done       Problem List Items Addressed This Visit    None     Visit Diagnoses     Bronchitis    -  Primary    Relevant Medications    methylPREDNISolone acetate injection 80 mg (Completed)    levoFLOXacin (LEVAQUIN) 250 MG tablet    predniSONE (DELTASONE) 20 MG tablet          The patient has no Health Maintenance topics of  status Not Due    No future appointments.     There are no Patient Instructions on file for this visit.  Follow up in about 2 days (around 4/15/2022) for routine followup.     Signature:  Jayant Castro MD      Date of encounter: 4/13/22

## 2022-04-15 ENCOUNTER — OFFICE VISIT (OUTPATIENT)
Dept: FAMILY MEDICINE | Facility: CLINIC | Age: 82
End: 2022-04-15
Payer: MEDICARE

## 2022-04-15 VITALS
TEMPERATURE: 98 F | DIASTOLIC BLOOD PRESSURE: 72 MMHG | BODY MASS INDEX: 29.8 KG/M2 | HEART RATE: 80 BPM | WEIGHT: 220 LBS | SYSTOLIC BLOOD PRESSURE: 126 MMHG | RESPIRATION RATE: 16 BRPM | HEIGHT: 72 IN | OXYGEN SATURATION: 93 %

## 2022-04-15 DIAGNOSIS — J44.9 CHRONIC OBSTRUCTIVE PULMONARY DISEASE, UNSPECIFIED COPD TYPE: Chronic | ICD-10-CM

## 2022-04-15 PROCEDURE — 1101F PR PT FALLS ASSESS DOC 0-1 FALLS W/OUT INJ PAST YR: ICD-10-PCS | Mod: ,,, | Performed by: FAMILY MEDICINE

## 2022-04-15 PROCEDURE — 3078F PR MOST RECENT DIASTOLIC BLOOD PRESSURE < 80 MM HG: ICD-10-PCS | Mod: ,,, | Performed by: FAMILY MEDICINE

## 2022-04-15 PROCEDURE — 1159F MED LIST DOCD IN RCRD: CPT | Mod: ,,, | Performed by: FAMILY MEDICINE

## 2022-04-15 PROCEDURE — 1126F PR PAIN SEVERITY QUANTIFIED, NO PAIN PRESENT: ICD-10-PCS | Mod: ,,, | Performed by: FAMILY MEDICINE

## 2022-04-15 PROCEDURE — 99213 OFFICE O/P EST LOW 20 MIN: CPT | Mod: ,,, | Performed by: FAMILY MEDICINE

## 2022-04-15 PROCEDURE — 3288F FALL RISK ASSESSMENT DOCD: CPT | Mod: ,,, | Performed by: FAMILY MEDICINE

## 2022-04-15 PROCEDURE — 1159F PR MEDICATION LIST DOCUMENTED IN MEDICAL RECORD: ICD-10-PCS | Mod: ,,, | Performed by: FAMILY MEDICINE

## 2022-04-15 PROCEDURE — 1101F PT FALLS ASSESS-DOCD LE1/YR: CPT | Mod: ,,, | Performed by: FAMILY MEDICINE

## 2022-04-15 PROCEDURE — 3074F SYST BP LT 130 MM HG: CPT | Mod: ,,, | Performed by: FAMILY MEDICINE

## 2022-04-15 PROCEDURE — 1126F AMNT PAIN NOTED NONE PRSNT: CPT | Mod: ,,, | Performed by: FAMILY MEDICINE

## 2022-04-15 PROCEDURE — 3078F DIAST BP <80 MM HG: CPT | Mod: ,,, | Performed by: FAMILY MEDICINE

## 2022-04-15 PROCEDURE — 3288F PR FALLS RISK ASSESSMENT DOCUMENTED: ICD-10-PCS | Mod: ,,, | Performed by: FAMILY MEDICINE

## 2022-04-15 PROCEDURE — 3074F PR MOST RECENT SYSTOLIC BLOOD PRESSURE < 130 MM HG: ICD-10-PCS | Mod: ,,, | Performed by: FAMILY MEDICINE

## 2022-04-15 PROCEDURE — 99213 PR OFFICE/OUTPT VISIT, EST, LEVL III, 20-29 MIN: ICD-10-PCS | Mod: ,,, | Performed by: FAMILY MEDICINE

## 2022-04-15 PROCEDURE — 1160F PR REVIEW ALL MEDS BY PRESCRIBER/CLIN PHARMACIST DOCUMENTED: ICD-10-PCS | Mod: ,,, | Performed by: FAMILY MEDICINE

## 2022-04-15 PROCEDURE — 1160F RVW MEDS BY RX/DR IN RCRD: CPT | Mod: ,,, | Performed by: FAMILY MEDICINE

## 2022-04-15 NOTE — PROGRESS NOTES
Jayant Castro MD        PATIENT NAME: Nathan Hinton  : 1940  DATE: 4/15/22  MRN: [id number]      Billing Provider: Jayant Castro MD  Level of Service: [place] OFFICE/OUTPT VISIT, EST, LEVL III, 20-29 MIN  Patient PCP Information     Provider PCP Type    Jayant Castro MD General          Reason for Visit / Chief Complaint: URI (Recheck bronchitis)       Update PCP  Update Chief Complaint         History of Present Illness / Problem Focused Workflow     Nathan Hinton presents to the clinic with URI (Recheck bronchitis)     Feels much better today.        Review of Systems     Review of Systems   Constitutional: Negative for activity change, appetite change, fever and unexpected weight change.   HENT: Negative for congestion, rhinorrhea, sinus pressure, sinus pain, sore throat and trouble swallowing.    Eyes: Negative for photophobia, pain, discharge and visual disturbance.   Respiratory: Positive for cough. Negative for chest tightness, wheezing and stridor.    Cardiovascular: Negative for chest pain, palpitations and leg swelling.   Gastrointestinal: Negative for abdominal pain, blood in stool, constipation, diarrhea and nausea.   Endocrine: Negative for polydipsia, polyphagia and polyuria.   Genitourinary: Negative for difficulty urinating, flank pain and hematuria.   Musculoskeletal: Negative for arthralgias and neck pain.   Skin: Negative for rash.   Allergic/Immunologic: Negative for food allergies.   Neurological: Negative for dizziness, tremors, seizures, syncope, weakness (global weakness) and headaches.   Psychiatric/Behavioral: Negative for behavioral problems, confusion, decreased concentration, dysphoric mood and hallucinations. The patient is not nervous/anxious.         Medical / Social / Family History     Past Medical History:   Diagnosis Date    #[id number]     COPD (chronic obstructive pulmonary disease)     Hypertension     Sarcoma        Past Surgical History:   Procedure Laterality Date     ABDOMINAL AORTIC ANEURYSM REPAIR      BACK SURGERY      FLEXOR TENDON REPAIR Right 11/4/2021    Procedure: REPAIR, TENDON, FLEXOR;  Surgeon: Dani Degroot MD;  [facility];  Service: Orthopedics;  Laterality: Right;    SURGICAL REMOVAL OF SARCOMA      TRIGGER FINGER RELEASE Right 11/4/2021    Procedure: RELEASE, TRIGGER FINGER;  Surgeon: Dani Degroot MD;  Location: [facility] OR;  Service: Orthopedics;  Laterality: Right;    VASCULAR SURGERY         Social History    reports that he has quit smoking. His smokeless tobacco use includes chew. He reports current alcohol use. He reports that he does not use drugs.    Family History  's family history is not on file.    Medications and Allergies     Medications  No outpatient medications have been marked as taking for the 4/15/22 encounter (Office Visit) with Jayant Castro MD.       Allergies  Review of patient's allergies indicates:   Allergen Reactions    Sulfa (sulfonamide antibiotics) Hives       Physical Examination     Vitals:    04/15/22 0837   BP: 126/72   Pulse: 80   Resp: 16   Temp: 97.6 °F (36.4 °C)     Physical Exam  Constitutional:       General: He is not in acute distress.     Appearance: Normal appearance.   HENT:      Head: Normocephalic.      Right Ear: Tympanic membrane and ear canal normal.      Left Ear: Tympanic membrane and ear canal normal.      Nose: Nose normal.      Mouth/Throat:      Mouth: Mucous membranes are moist.      Pharynx: No oropharyngeal exudate.   Eyes:      Extraocular Movements: Extraocular movements intact.      Pupils: Pupils are equal, round, and reactive to light.   Cardiovascular:      Rate and Rhythm: Normal rate and regular rhythm.      Heart sounds: No murmur heard.  Pulmonary:      Effort: Pulmonary effort is normal.      Breath sounds: Normal breath sounds. No wheezing.   Abdominal:      General: Abdomen is flat. Bowel sounds are normal.      Palpations: Abdomen is  soft.      Hernia: No hernia is present.   Musculoskeletal:         General: Normal range of motion.      Cervical back: Normal range of motion and neck supple.      Right lower leg: No edema.      Left lower leg: No edema.   Lymphadenopathy:      Cervical: No cervical adenopathy.   Skin:     General: Skin is warm and dry.      Coloration: Skin is not jaundiced.      Findings: No lesion.   Neurological:      General: No focal deficit present.      Mental Status: He is alert and oriented to person, place, and time.      Cranial Nerves: No cranial nerve deficit.      Gait: Gait normal.   Psychiatric:         Mood and Affect: Mood normal.         Behavior: Behavior normal.         Judgment: Judgment normal.          Assessment and Plan (including Health Maintenance)      Problem List  Smart [email or web address]  Document Outside HM   :    Plan:   Chronic obstructive pulmonary disease, unspecified COPD type           Health Maintenance Due   Topic Date Due    Lipid Panel  Never done    TETANUS VACCINE  Never done    Shingles Vaccine (1 of 2) Never done    Pneumococcal Vaccines (Age 65+) (1 - PCV) Never done       Problem List Items Addressed This Visit        Pulmonary    COPD (chronic obstructive pulmonary disease) (Chronic)          The patient has no Health Maintenance topics of status Not Due    No future appointments.     There are no Patient Instructions on file for this visit.  Follow up if symptoms worsen or fail to improve.     Signature:  Jayant Castro MD      Date of encounter: 4/15/22

## 2022-05-06 ENCOUNTER — OFFICE VISIT (OUTPATIENT)
Dept: FAMILY MEDICINE | Facility: CLINIC | Age: 82
End: 2022-05-06
Payer: MEDICARE

## 2022-05-06 VITALS
WEIGHT: 216 LBS | DIASTOLIC BLOOD PRESSURE: 86 MMHG | HEIGHT: 72 IN | BODY MASS INDEX: 29.26 KG/M2 | SYSTOLIC BLOOD PRESSURE: 120 MMHG | HEART RATE: 80 BPM | RESPIRATION RATE: 16 BRPM

## 2022-05-06 DIAGNOSIS — I10 PRIMARY HYPERTENSION: Chronic | ICD-10-CM

## 2022-05-06 DIAGNOSIS — J40 BRONCHITIS: Primary | ICD-10-CM

## 2022-05-06 PROCEDURE — 3079F DIAST BP 80-89 MM HG: CPT | Mod: ,,, | Performed by: FAMILY MEDICINE

## 2022-05-06 PROCEDURE — 3074F PR MOST RECENT SYSTOLIC BLOOD PRESSURE < 130 MM HG: ICD-10-PCS | Mod: ,,, | Performed by: FAMILY MEDICINE

## 2022-05-06 PROCEDURE — 1101F PR PT FALLS ASSESS DOC 0-1 FALLS W/OUT INJ PAST YR: ICD-10-PCS | Mod: ,,, | Performed by: FAMILY MEDICINE

## 2022-05-06 PROCEDURE — 1125F AMNT PAIN NOTED PAIN PRSNT: CPT | Mod: ,,, | Performed by: FAMILY MEDICINE

## 2022-05-06 PROCEDURE — 1160F PR REVIEW ALL MEDS BY PRESCRIBER/CLIN PHARMACIST DOCUMENTED: ICD-10-PCS | Mod: ,,, | Performed by: FAMILY MEDICINE

## 2022-05-06 PROCEDURE — 1160F RVW MEDS BY RX/DR IN RCRD: CPT | Mod: ,,, | Performed by: FAMILY MEDICINE

## 2022-05-06 PROCEDURE — 3074F SYST BP LT 130 MM HG: CPT | Mod: ,,, | Performed by: FAMILY MEDICINE

## 2022-05-06 PROCEDURE — 1159F MED LIST DOCD IN RCRD: CPT | Mod: ,,, | Performed by: FAMILY MEDICINE

## 2022-05-06 PROCEDURE — 99213 OFFICE O/P EST LOW 20 MIN: CPT | Mod: ,,, | Performed by: FAMILY MEDICINE

## 2022-05-06 PROCEDURE — 3079F PR MOST RECENT DIASTOLIC BLOOD PRESSURE 80-89 MM HG: ICD-10-PCS | Mod: ,,, | Performed by: FAMILY MEDICINE

## 2022-05-06 PROCEDURE — 1101F PT FALLS ASSESS-DOCD LE1/YR: CPT | Mod: ,,, | Performed by: FAMILY MEDICINE

## 2022-05-06 PROCEDURE — 1125F PR PAIN SEVERITY QUANTIFIED, PAIN PRESENT: ICD-10-PCS | Mod: ,,, | Performed by: FAMILY MEDICINE

## 2022-05-06 PROCEDURE — 1159F PR MEDICATION LIST DOCUMENTED IN MEDICAL RECORD: ICD-10-PCS | Mod: ,,, | Performed by: FAMILY MEDICINE

## 2022-05-06 PROCEDURE — 99213 PR OFFICE/OUTPT VISIT, EST, LEVL III, 20-29 MIN: ICD-10-PCS | Mod: ,,, | Performed by: FAMILY MEDICINE

## 2022-05-06 PROCEDURE — 3288F FALL RISK ASSESSMENT DOCD: CPT | Mod: ,,, | Performed by: FAMILY MEDICINE

## 2022-05-06 PROCEDURE — 3288F PR FALLS RISK ASSESSMENT DOCUMENTED: ICD-10-PCS | Mod: ,,, | Performed by: FAMILY MEDICINE

## 2022-05-06 RX ORDER — LEVOFLOXACIN 250 MG/1
TABLET ORAL
Qty: 11 TABLET | Refills: 0 | Status: SHIPPED | OUTPATIENT
Start: 2022-05-06 | End: 2023-01-23

## 2022-05-06 NOTE — PROGRESS NOTES
Jayant Castro MD        PATIENT NAME: Nathan Hinton  : 1940  DATE: 22  MRN: [id number]      Billing Provider: Jayant Castro MD  Level of Service: [place] OFFICE/OUTPT VISIT, EST, LEVL III, 20-29 MIN  Patient PCP Information     Provider PCP Type    Jayant Castro MD General          Reason for Visit / Chief Complaint: Abdominal Pain (Pain in LLQ abdomen for 2-3 days)       Update PCP  Update Chief Complaint         History of Present Illness / Problem Focused Workflow     Nathan Hinton presents to the clinic with Abdominal Pain (Pain in LLQ abdomen for 2-3 days)     Doesn't feel well.  Discomfort R mid-axillary line.  Good appetite, regular BM.  Worse with cough or sneeze.        Review of Systems     Review of Systems   Constitutional: Positive for fatigue. Negative for activity change, appetite change, fever and unexpected weight change.   HENT: Negative for congestion, rhinorrhea, sinus pressure, sinus pain, sore throat and trouble swallowing.    Eyes: Negative for photophobia, pain, discharge and visual disturbance.   Respiratory: Negative for cough, chest tightness, wheezing and stridor.    Cardiovascular: Negative for chest pain, palpitations and leg swelling.   Gastrointestinal: Negative for abdominal pain, blood in stool, constipation, diarrhea and nausea.   Endocrine: Negative for polydipsia, polyphagia and polyuria.   Genitourinary: Negative for difficulty urinating, flank pain and hematuria.   Musculoskeletal: Negative for arthralgias and neck pain.   Skin: Negative for rash.   Allergic/Immunologic: Negative for food allergies.   Neurological: Negative for dizziness, tremors, seizures, syncope, weakness (global weakness) and headaches.   Psychiatric/Behavioral: Negative for behavioral problems, confusion, decreased concentration, dysphoric mood and hallucinations. The patient is not nervous/anxious.         Medical / Social / Family History     Past Medical History:   Diagnosis Date    #[id number]      COPD (chronic obstructive pulmonary disease)     Hypertension     Sarcoma        Past Surgical History:   Procedure Laterality Date    ABDOMINAL AORTIC ANEURYSM REPAIR      BACK SURGERY      FLEXOR TENDON REPAIR Right 11/4/2021    Procedure: REPAIR, TENDON, FLEXOR;  Surgeon: Dani Degroot MD;  Location: [facility];  Service: Orthopedics;  Laterality: Right;    SURGICAL REMOVAL OF SARCOMA      TRIGGER FINGER RELEASE Right 11/4/2021    Procedure: RELEASE, TRIGGER FINGER;  Surgeon: Dani Degroot MD;  Location: [facility];  Service: Orthopedics;  Laterality: Right;    VASCULAR SURGERY         Social History    reports that he has quit smoking. His smokeless tobacco use includes chew. He reports current alcohol use. He reports that he does not use drugs.    Family History  's family history is not on file.    Medications and Allergies     Medications  Outpatient Medications Marked as Taking for the 5/6/22 encounter (Office Visit) with Jayant Castro MD   Medication Sig Dispense Refill    amlodipine besylate (AMLODIPINE ORAL) Take 5 mg by mouth Daily.      aspirin (ECOTRIN) 81 MG EC tablet Take 81 mg by mouth once daily.      atorvastatin (LIPITOR) 80 MG tablet Take 80 mg by mouth.      multivit-min/ferrous fumarate (MULTI VITAMIN ORAL) Take by mouth.      omeprazole (PRILOSEC) 20 MG capsule Take 20 mg by mouth.      umeclidinium-vilanteroL (ANORO ELLIPTA) 62.5-25 mcg/actuation DsDv Inhale into the lungs. Controller         Allergies  Review of patient's allergies indicates:   Allergen Reactions    Sulfa (sulfonamide antibiotics) Hives       Physical Examination     Vitals:    05/06/22 0857   BP: 120/86   Pulse: 80   Resp: 16     Physical Exam  Constitutional:       General: He is not in acute distress.     Appearance: Normal appearance.   HENT:      Head: Normocephalic.      Right Ear: Tympanic membrane and ear canal normal.      Left Ear: Tympanic membrane and ear  canal normal.      Nose: Nose normal.      Mouth/Throat:      Mouth: Mucous membranes are moist.      Pharynx: No oropharyngeal exudate.   Eyes:      Extraocular Movements: Extraocular movements intact.      Pupils: Pupils are equal, round, and reactive to light.   Cardiovascular:      Rate and Rhythm: Normal rate and regular rhythm.      Heart sounds: No murmur heard.  Pulmonary:      Effort: Pulmonary effort is normal.      Breath sounds: Normal breath sounds. No wheezing.   Abdominal:      General: Abdomen is flat. Bowel sounds are normal.      Palpations: Abdomen is soft.      Hernia: No hernia is present.   Musculoskeletal:         General: Normal range of motion.      Cervical back: Normal range of motion and neck supple.      Right lower leg: No edema.      Left lower leg: No edema.   Lymphadenopathy:      Cervical: No cervical adenopathy.   Skin:     General: Skin is warm and dry.      Coloration: Skin is not jaundiced.      Findings: No lesion.   Neurological:      General: No focal deficit present.      Mental Status: He is alert and oriented to person, place, and time.      Cranial Nerves: No cranial nerve deficit.      Gait: Gait normal.   Psychiatric:         Mood and Affect: Mood normal.         Behavior: Behavior normal.         Judgment: Judgment normal.          Assessment and Plan (including Health Maintenance)      Problem List  Smart Sets  Document Outside HM   :    Plan:   Bronchitis  -     X-Ray Chest PA And Lateral; Future; Expected date: 05/06/2022  -     levoFLOXacin (LEVAQUIN) 250 MG tablet; 2 tabs po now then 1 tab po q day x 9 days  Dispense: 11 tablet; Refill: 0    Primary hypertension           Health Maintenance Due   Topic Date Due    TETANUS VACCINE  Never done    Shingles Vaccine (1 of 2) Never done    Pneumococcal Vaccines (Age 65+) (1 - PCV) Never done    COVID-19 Vaccine (3 - Booster for Dejan series) 04/02/2022       Problem List Items Addressed This Visit         Cardiac/Vascular    Hypertension (Chronic)      Other Visit Diagnoses     Bronchitis    -  Primary    Relevant Medications    levoFLOXacin (LEVAQUIN) 250 MG tablet    Other Relevant Orders    X-Ray Chest PA And Lateral (Completed)          Health Maintenance Topics with due status: Not Due       Topic Last Completion Date    Lipid Panel 08/03/2020       Future Appointments   Date Time Provider Department Center   6/8/2022  9:00 AM DANICA [facility]        There are no Patient Instructions on file for this visit.  Follow up if symptoms worsen or fail to improve.     Signature:  Jayant Castro MD      Date of encounter: 5/6/22

## 2022-06-13 ENCOUNTER — OFFICE VISIT (OUTPATIENT)
Dept: FAMILY MEDICINE | Facility: CLINIC | Age: 82
End: 2022-06-13
Payer: MEDICARE

## 2022-06-13 VITALS
RESPIRATION RATE: 18 BRPM | DIASTOLIC BLOOD PRESSURE: 58 MMHG | OXYGEN SATURATION: 93 % | WEIGHT: 219.81 LBS | SYSTOLIC BLOOD PRESSURE: 115 MMHG | HEART RATE: 82 BPM | HEIGHT: 72 IN | BODY MASS INDEX: 29.77 KG/M2

## 2022-06-13 DIAGNOSIS — I10 PRIMARY HYPERTENSION: Primary | ICD-10-CM

## 2022-06-13 DIAGNOSIS — B35.1 ONYCHOMYCOSIS: ICD-10-CM

## 2022-06-13 DIAGNOSIS — J40 BRONCHITIS: ICD-10-CM

## 2022-06-13 PROCEDURE — 1160F PR REVIEW ALL MEDS BY PRESCRIBER/CLIN PHARMACIST DOCUMENTED: ICD-10-PCS | Mod: ,,, | Performed by: FAMILY MEDICINE

## 2022-06-13 PROCEDURE — 1125F PR PAIN SEVERITY QUANTIFIED, PAIN PRESENT: ICD-10-PCS | Mod: ,,, | Performed by: FAMILY MEDICINE

## 2022-06-13 PROCEDURE — 1159F PR MEDICATION LIST DOCUMENTED IN MEDICAL RECORD: ICD-10-PCS | Mod: ,,, | Performed by: FAMILY MEDICINE

## 2022-06-13 PROCEDURE — 3078F PR MOST RECENT DIASTOLIC BLOOD PRESSURE < 80 MM HG: ICD-10-PCS | Mod: ,,, | Performed by: FAMILY MEDICINE

## 2022-06-13 PROCEDURE — 99213 PR OFFICE/OUTPT VISIT, EST, LEVL III, 20-29 MIN: ICD-10-PCS | Mod: ,,, | Performed by: FAMILY MEDICINE

## 2022-06-13 PROCEDURE — 1125F AMNT PAIN NOTED PAIN PRSNT: CPT | Mod: ,,, | Performed by: FAMILY MEDICINE

## 2022-06-13 PROCEDURE — 3074F SYST BP LT 130 MM HG: CPT | Mod: ,,, | Performed by: FAMILY MEDICINE

## 2022-06-13 PROCEDURE — 99213 OFFICE O/P EST LOW 20 MIN: CPT | Mod: ,,, | Performed by: FAMILY MEDICINE

## 2022-06-13 PROCEDURE — 3078F DIAST BP <80 MM HG: CPT | Mod: ,,, | Performed by: FAMILY MEDICINE

## 2022-06-13 PROCEDURE — 1160F RVW MEDS BY RX/DR IN RCRD: CPT | Mod: ,,, | Performed by: FAMILY MEDICINE

## 2022-06-13 PROCEDURE — 1159F MED LIST DOCD IN RCRD: CPT | Mod: ,,, | Performed by: FAMILY MEDICINE

## 2022-06-13 PROCEDURE — 3074F PR MOST RECENT SYSTOLIC BLOOD PRESSURE < 130 MM HG: ICD-10-PCS | Mod: ,,, | Performed by: FAMILY MEDICINE

## 2022-06-13 RX ORDER — CICLOPIROX 80 MG/ML
SOLUTION TOPICAL NIGHTLY
Qty: 6.6 ML | Refills: 11 | Status: ON HOLD | OUTPATIENT
Start: 2022-06-13 | End: 2023-07-14 | Stop reason: HOSPADM

## 2022-06-13 NOTE — PROGRESS NOTES
Jayant Castro MD        PATIENT NAME: Nathan Hinton  : 1940  DATE: 22  MRN: [id number]      Billing Provider: Jayant Castro MD  Level of Service: [place] OFFICE/OUTPT VISIT, EST, LEVL III, 20-29 MIN  Patient PCP Information     Provider PCP Type    Jayant Castro MD General          Reason for Visit / Chief Complaint: Follow-up (Patient states he is here for a follow-up chest xray)       Update PCP  Update Chief Complaint         History of Present Illness / Problem Focused Workflow     Nathan Hinton presents to the clinic with Follow-up (Patient states he is here for a follow-up chest xray)     Feels well.  Post treatment pneumonia xray.  Bad right foot.  As thickened right great toenail which is raised and painful an issue.      Review of Systems     Review of Systems   Constitutional: Negative for activity change, appetite change, fever and unexpected weight change.   HENT: Negative for congestion, rhinorrhea, sinus pressure, sinus pain, sore throat and trouble swallowing.    Eyes: Negative for photophobia, pain, discharge and visual disturbance.   Respiratory: Positive for cough. Negative for chest tightness, wheezing and stridor.    Cardiovascular: Negative for chest pain, palpitations and leg swelling.   Gastrointestinal: Negative for abdominal pain, blood in stool, constipation, diarrhea and nausea.   Endocrine: Negative for polydipsia, polyphagia and polyuria.   Genitourinary: Negative for difficulty urinating, flank pain and hematuria.   Musculoskeletal: Negative for arthralgias and neck pain.   Skin: Negative for rash.   Allergic/Immunologic: Negative for food allergies.   Neurological: Negative for dizziness, tremors, seizures, syncope, weakness (global weakness) and headaches.   Psychiatric/Behavioral: Negative for behavioral problems, confusion, decreased concentration, dysphoric mood and hallucinations. The patient is not nervous/anxious.         Medical / Social / Family History     Past  Medical History:   Diagnosis Date    #[id number]     COPD (chronic obstructive pulmonary disease)     Hypertension     Sarcoma        Past Surgical History:   Procedure Laterality Date    ABDOMINAL AORTIC ANEURYSM REPAIR      BACK SURGERY      FLEXOR TENDON REPAIR Right 11/4/2021    Procedure: REPAIR, TENDON, FLEXOR;  Surgeon: Dani Degroot MD;  Location: [facility];  Service: Orthopedics;  Laterality: Right;    SURGICAL REMOVAL OF SARCOMA      TRIGGER FINGER RELEASE Right 11/4/2021    Procedure: RELEASE, TRIGGER FINGER;  Surgeon: Dani Degroot MD;  Location: [facility];  Service: Orthopedics;  Laterality: Right;    VASCULAR SURGERY         Social History    reports that he has quit smoking. His smokeless tobacco use includes chew. He reports current alcohol use. He reports that he does not use drugs.    Family History  's family history is not on file.    Medications and Allergies     Medications  No outpatient medications have been marked as taking for the 6/13/22 encounter (Office Visit) with Jayant Castro MD.       Allergies  Review of patient's allergies indicates:   Allergen Reactions    Sulfa (sulfonamide antibiotics) Hives       Physical Examination     Vitals:    06/13/22 0907   BP: (!) 115/58   Pulse: 82   Resp: 18     Physical Exam  Constitutional:       General: He is not in acute distress.     Appearance: Normal appearance.   HENT:      Head: Normocephalic.      Right Ear: Tympanic membrane and ear canal normal.      Left Ear: Tympanic membrane and ear canal normal.      Nose: Nose normal.      Mouth/Throat:      Mouth: Mucous membranes are moist.      Pharynx: No oropharyngeal exudate.   Eyes:      Extraocular Movements: Extraocular movements intact.      Pupils: Pupils are equal, round, and reactive to light.   Cardiovascular:      Rate and Rhythm: Normal rate and regular rhythm.      Heart sounds: No murmur heard.  Pulmonary:      Effort: Pulmonary effort  is normal.      Breath sounds: Normal breath sounds. No wheezing.   Abdominal:      General: Abdomen is flat. Bowel sounds are normal.      Palpations: Abdomen is soft.      Hernia: No hernia is present.   Musculoskeletal:         General: Normal range of motion.      Cervical back: Normal range of motion and neck supple.      Right lower leg: No edema.      Left lower leg: No edema.   Lymphadenopathy:      Cervical: No cervical adenopathy.   Skin:     General: Skin is warm and dry.      Coloration: Skin is not jaundiced.      Findings: Lesion present.      Comments: Right great toenail thickened and raised approximately 2 cm vertically.  This was debrided with toenail clippers.   Neurological:      General: No focal deficit present.      Mental Status: He is alert and oriented to person, place, and time.      Cranial Nerves: No cranial nerve deficit.      Gait: Gait normal.   Psychiatric:         Mood and Affect: Mood normal.         Behavior: Behavior normal.         Judgment: Judgment normal.          Assessment and Plan (including Health Maintenance)      Problem List  Smart Sets  Document Outside HM   :    Plan:   Primary hypertension    Bronchitis  -     X-Ray Chest PA And Lateral; Future; Expected date: 06/13/2022    Onychomycosis  -     ciclopirox (PENLAC) 8 % Soln; Apply topically nightly.  Dispense: 6.6 mL; Refill: 11           Health Maintenance Due   Topic Date Due    TETANUS VACCINE  Never done    Shingles Vaccine (1 of 2) Never done    Pneumococcal Vaccines (Age 65+) (1 - PCV) Never done    COVID-19 Vaccine (3 - Booster for Dejan series) 04/02/2022       Problem List Items Addressed This Visit        Cardiac/Vascular    Hypertension - Primary (Chronic)      Other Visit Diagnoses     Bronchitis        Relevant Orders    X-Ray Chest PA And Lateral (Completed)    Onychomycosis        Relevant Medications    ciclopirox (PENLAC) 8 % Soln          Health Maintenance Topics with due status: Not Due        Topic Last Completion Date    Lipid Panel 08/03/2020       No future appointments.     There are no Patient Instructions on file for this visit.  Follow up if symptoms worsen or fail to improve.     Signature:  Jayant Castro MD      Date of encounter: 6/13/22

## 2022-08-24 ENCOUNTER — HOSPITAL ENCOUNTER (EMERGENCY)
Facility: HOSPITAL | Age: 82
Discharge: HOME | End: 2022-08-24
Payer: MEDICARE

## 2022-08-24 VITALS
BODY MASS INDEX: 30.48 KG/M2 | HEART RATE: 80 BPM | WEIGHT: 225 LBS | TEMPERATURE: 98 F | OXYGEN SATURATION: 94 % | HEIGHT: 72 IN | RESPIRATION RATE: 20 BRPM | SYSTOLIC BLOOD PRESSURE: 136 MMHG | DIASTOLIC BLOOD PRESSURE: 75 MMHG

## 2022-08-24 DIAGNOSIS — Z87.09 HISTORY OF COPD: ICD-10-CM

## 2022-08-24 DIAGNOSIS — J44.1 COPD EXACERBATION: Primary | ICD-10-CM

## 2022-08-24 DIAGNOSIS — R06.02 SHORTNESS OF BREATH: ICD-10-CM

## 2022-08-24 LAB
ANION GAP SERPL CALCULATED.3IONS-SCNC: 17 MMOL/L (ref 7–16)
ATYPICAL LYMPHOCYTES: ABNORMAL
BASOPHILS # BLD AUTO: 0.02 K/UL (ref 0–0.2)
BASOPHILS NFR BLD AUTO: 0.3 % (ref 0–1)
BUN SERPL-MCNC: 23 MG/DL (ref 7–18)
BUN/CREAT SERPL: 16 (ref 6–20)
CALCIUM SERPL-MCNC: 9.1 MG/DL (ref 8.5–10.1)
CHLORIDE SERPL-SCNC: 103 MMOL/L (ref 98–107)
CO2 SERPL-SCNC: 28 MMOL/L (ref 21–32)
CREAT SERPL-MCNC: 1.46 MG/DL (ref 0.7–1.3)
DIFFERENTIAL METHOD BLD: ABNORMAL
EGFR (NO RACE VARIABLE) (RUSH/TITUS): 47 ML/MIN/1.73M²
EOSINOPHIL # BLD AUTO: 0.61 K/UL (ref 0–0.5)
EOSINOPHIL NFR BLD AUTO: 8.5 % (ref 1–4)
EOSINOPHIL NFR BLD MANUAL: 7 % (ref 1–4)
ERYTHROCYTE [DISTWIDTH] IN BLOOD BY AUTOMATED COUNT: 12.4 % (ref 11.5–14.5)
GLUCOSE SERPL-MCNC: 114 MG/DL (ref 74–106)
HCT VFR BLD AUTO: 39.8 % (ref 40–54)
HGB BLD-MCNC: 13.2 G/DL (ref 13.5–18)
IMM GRANULOCYTES # BLD AUTO: 0.02 K/UL (ref 0–0.04)
IMM GRANULOCYTES NFR BLD: 0.3 % (ref 0–0.4)
LYMPHOCYTES # BLD AUTO: 2.48 K/UL (ref 1–4.8)
LYMPHOCYTES NFR BLD AUTO: 34.7 % (ref 27–41)
LYMPHOCYTES NFR BLD MANUAL: 32 % (ref 27–41)
MAGNESIUM SERPL-MCNC: 1.5 MG/DL (ref 1.7–2.3)
MCH RBC QN AUTO: 31.9 PG (ref 27–31)
MCHC RBC AUTO-ENTMCNC: 33.2 G/DL (ref 32–36)
MCV RBC AUTO: 96.1 FL (ref 80–96)
MONOCYTES # BLD AUTO: 0.74 K/UL (ref 0–0.8)
MONOCYTES NFR BLD AUTO: 10.3 % (ref 2–6)
MONOCYTES NFR BLD MANUAL: 8 % (ref 2–6)
MPC BLD CALC-MCNC: 9.9 FL (ref 9.4–12.4)
NEUTROPHILS # BLD AUTO: 3.28 K/UL (ref 1.8–7.7)
NEUTROPHILS NFR BLD AUTO: 45.9 % (ref 53–65)
NEUTS BAND NFR BLD MANUAL: 1 % (ref 1–5)
NEUTS SEG NFR BLD MANUAL: 52 % (ref 50–62)
NRBC # BLD AUTO: 0 X10E3/UL
NRBC, AUTO (.00): 0 %
PLATELET # BLD AUTO: 183 K/UL (ref 150–400)
PLATELET MORPHOLOGY: NORMAL
POTASSIUM SERPL-SCNC: 4.7 MMOL/L (ref 3.5–5.1)
RBC # BLD AUTO: 4.14 M/UL (ref 4.6–6.2)
RBC MORPH BLD: NORMAL
SODIUM SERPL-SCNC: 143 MMOL/L (ref 136–145)
TROPONIN I SERPL HS-MCNC: 40.7 PG/ML
WBC # BLD AUTO: 7.15 K/UL (ref 4.5–11)

## 2022-08-24 PROCEDURE — 63600175 PHARM REV CODE 636 W HCPCS: Performed by: NURSE PRACTITIONER

## 2022-08-24 PROCEDURE — 93010 ELECTROCARDIOGRAM REPORT: CPT | Mod: ,,, | Performed by: INTERNAL MEDICINE

## 2022-08-24 PROCEDURE — 36415 COLL VENOUS BLD VENIPUNCTURE: CPT | Performed by: NURSE PRACTITIONER

## 2022-08-24 PROCEDURE — 99284 PR EMERGENCY DEPT VISIT,LEVEL IV: ICD-10-PCS | Mod: ,,, | Performed by: NURSE PRACTITIONER

## 2022-08-24 PROCEDURE — 82310 ASSAY OF CALCIUM: CPT | Performed by: NURSE PRACTITIONER

## 2022-08-24 PROCEDURE — 84484 ASSAY OF TROPONIN QUANT: CPT | Performed by: NURSE PRACTITIONER

## 2022-08-24 PROCEDURE — 96374 THER/PROPH/DIAG INJ IV PUSH: CPT

## 2022-08-24 PROCEDURE — 93005 ELECTROCARDIOGRAM TRACING: CPT

## 2022-08-24 PROCEDURE — 85025 COMPLETE CBC W/AUTO DIFF WBC: CPT | Performed by: NURSE PRACTITIONER

## 2022-08-24 PROCEDURE — 93010 EKG 12-LEAD: ICD-10-PCS | Mod: ,,, | Performed by: INTERNAL MEDICINE

## 2022-08-24 PROCEDURE — 99284 EMERGENCY DEPT VISIT MOD MDM: CPT | Mod: ,,, | Performed by: NURSE PRACTITIONER

## 2022-08-24 PROCEDURE — 80048 BASIC METABOLIC PNL TOTAL CA: CPT | Performed by: NURSE PRACTITIONER

## 2022-08-24 PROCEDURE — 25000242 PHARM REV CODE 250 ALT 637 W/ HCPCS: Performed by: NURSE PRACTITIONER

## 2022-08-24 PROCEDURE — 83735 ASSAY OF MAGNESIUM: CPT | Performed by: NURSE PRACTITIONER

## 2022-08-24 PROCEDURE — 99285 EMERGENCY DEPT VISIT HI MDM: CPT | Mod: 25

## 2022-08-24 RX ORDER — IPRATROPIUM BROMIDE AND ALBUTEROL SULFATE 2.5; .5 MG/3ML; MG/3ML
3 SOLUTION RESPIRATORY (INHALATION)
Status: COMPLETED | OUTPATIENT
Start: 2022-08-24 | End: 2022-08-24

## 2022-08-24 RX ORDER — METHYLPREDNISOLONE SOD SUCC 125 MG
125 VIAL (EA) INJECTION
Status: COMPLETED | OUTPATIENT
Start: 2022-08-24 | End: 2022-08-24

## 2022-08-24 RX ORDER — PREDNISONE 10 MG/1
TABLET ORAL
Qty: 30 TABLET | Refills: 0 | Status: SHIPPED | OUTPATIENT
Start: 2022-08-24 | End: 2022-09-05

## 2022-08-24 RX ADMIN — METHYLPREDNISOLONE SODIUM SUCCINATE 125 MG: 125 INJECTION, POWDER, FOR SOLUTION INTRAMUSCULAR; INTRAVENOUS at 04:08

## 2022-08-24 RX ADMIN — IPRATROPIUM BROMIDE AND ALBUTEROL SULFATE 3 ML: 2.5; .5 SOLUTION RESPIRATORY (INHALATION) at 04:08

## 2022-08-24 NOTE — DISCHARGE INSTRUCTIONS
Take prescriptions as directed. Continue to use breathing treatments routinely as previously directed. Follow up with your primary care provider in 1 week for recheck and continued care and management. Return to the ED for worsening signs and symptoms or otherwise as needed.

## 2022-08-24 NOTE — ED PROVIDER NOTES
Encounter Date: 8/24/2022       History     Chief Complaint   Patient presents with    Shortness of Breath     SOB started about 1 hour PTA.  Took a breathing treatment and it helped      81 y/o WM with PMH of HTN and COPD presents to the ED with c/o shortness of breath that started 1-2 hours prior to arrival. States he took one of his home breathing treatments and it did improve his symptoms. States it scared him so he came to the ED to be checked out but reports being at his baseline at this time. He denies chest pain, nausea, vomiting or diaphoresis. Denies fever or chills or any known sick contacts. States quit smoking about 30 years ago, denies any other nicotine use, alcohol or illicit drug use.     The history is provided by the patient.     Review of patient's allergies indicates:   Allergen Reactions    Sulfa (sulfonamide antibiotics) Hives     Past Medical History:   Diagnosis Date    #[id number]     COPD (chronic obstructive pulmonary disease)     Hypertension     Sarcoma      Past Surgical History:   Procedure Laterality Date    ABDOMINAL AORTIC ANEURYSM REPAIR      BACK SURGERY      FLEXOR TENDON REPAIR Right 11/4/2021    Procedure: REPAIR, TENDON, FLEXOR;  Surgeon: Dani Degroot MD;  Location: [facility];  Service: Orthopedics;  Laterality: Right;    SURGICAL REMOVAL OF SARCOMA      TRIGGER FINGER RELEASE Right 11/4/2021    Procedure: RELEASE, TRIGGER FINGER;  Surgeon: Dani Degroot MD;  Location: [facility];  Service: Orthopedics;  Laterality: Right;    VASCULAR SURGERY       History reviewed. No pertinent family history.  Social History     Tobacco Use    Smoking status: Former Smoker    Smokeless tobacco: Current User     Types: Chew   Substance Use Topics    Alcohol use: Yes    Drug use: Never     Review of Systems   Constitutional: Negative for chills, diaphoresis and fever.   HENT: Negative for congestion.    Respiratory: Positive for shortness of breath.  Negative for cough and wheezing.    Cardiovascular: Negative for chest pain and palpitations.   Gastrointestinal: Negative for abdominal pain, constipation, diarrhea, nausea and vomiting.   Genitourinary: Negative for dysuria and hematuria.   Neurological: Negative for dizziness and headaches.   All other systems reviewed and are negative.      Physical Exam     Initial Vitals [08/24/22 1616]   BP Pulse Resp Temp SpO2   (!) 151/78 95 18 98.4 °F (36.9 °C) (!) 90 %      MAP       --         Physical Exam    Constitutional: He appears well-developed and well-nourished. He is active and cooperative.   Cardiovascular: Normal rate, regular rhythm, normal heart sounds and normal pulses.   Pulmonary/Chest: Effort normal. No accessory muscle usage. No tachypnea and no bradypnea. No respiratory distress. He has decreased breath sounds in the right lower field and the left lower field. He has wheezes (faint) in the right middle field and the left upper field.   Abdominal: Abdomen is soft. Bowel sounds are normal. There is no abdominal tenderness.     Neurological: He is alert and oriented to person, place, and time.   Skin: Skin is warm, dry and intact. Capillary refill takes less than 2 seconds.   Psychiatric: He has a normal mood and affect. His speech is normal and behavior is normal. Judgment and thought content normal. Cognition and memory are normal.         Medical Screening Exam   See Full Note    ED Course   Procedures  Labs Reviewed   BASIC METABOLIC PANEL - Abnormal; Notable for the following components:       Result Value    Anion Gap 17 (*)     Glucose 114 (*)     BUN 23 (*)     Creatinine 1.46 (*)     eGFR 47 (*)     All other components within normal limits   MAGNESIUM - Abnormal; Notable for the following components:    Magnesium 1.5 (*)     All other components within normal limits   CBC WITH DIFFERENTIAL - Abnormal; Notable for the following components:    RBC 4.14 (*)     Hemoglobin 13.2 (*)     Hematocrit  39.8 (*)     MCV 96.1 (*)     MCH 31.9 (*)     Neutrophils % 45.9 (*)     Monocytes % 10.3 (*)     Eosinophils % 8.5 (*)     Eosinophils, Absolute 0.61 (*)     All other components within normal limits   MANUAL DIFFERENTIAL - Abnormal; Notable for the following components:    Monocytes, Man % 8 (*)     Eosinophils, Man % 7 (*)     All other components within normal limits   TROPONIN I - Normal   CBC W/ AUTO DIFFERENTIAL    Narrative:     The following orders were created for panel order CBC auto differential.  Procedure                               Abnormality         Status                     ---------                               -----------         ------                     CBC with Differential[[id number]]        Abnormal            Final result               Manual Differential[[id number]]          Abnormal            Final result                 Please view results for these tests on the individual orders.   EXTRA TUBES    Narrative:     The following orders were created for panel order EXTRA TUBES.  Procedure                               Abnormality         Status                     ---------                               -----------         ------                     Light Blue Top Hold[[id number]]                              In process                   Please view results for these tests on the individual orders.   LIGHT BLUE TOP HOLD          Imaging Results          X-Ray Chest AP Portable (Final result)  Result time 08/24/22 16:43:35    Final result by Dani Vazquez MD (08/24/22 16:43:35)                 Impression:      Atelectatic changes in both lung bases similar to previous studies.  No acute findings.      Electronically signed by: Dani Vazquez  Date:    08/24/2022  Time:    16:43             Narrative:    EXAMINATION:  XR CHEST AP PORTABLE    CLINICAL HISTORY:  Shortness of breath    TECHNIQUE:  Single frontal view of the chest was performed.    COMPARISON:  06/13/2022    FINDINGS:  Cardiac  silhouette is enlarged and similar to prior.  There are linear atelectatic changes in the right more than left lung bases similar to prior studies.  No pneumothorax, pleural effusion, or focal consolidation.                                 Medications   albuterol-ipratropium 2.5 mg-0.5 mg/3 mL nebulizer solution 3 mL (3 mLs Nebulization Given 8/24/22 1655)   methylPREDNISolone sodium succinate injection 125 mg (125 mg Intravenous Given 8/24/22 1654)                   Counseled on supportive measures. Warning s/s discussed and return precautions given; the patient has v/u.      Clinical Impression:   Final diagnoses:  [R06.02] Shortness of breath  [Z87.09] History of COPD  [J44.1] COPD exacerbation (Primary)          ED Disposition Condition    Discharge Stable        ED Prescriptions     Medication Sig Dispense Start Date End Date Auth. Provider    predniSONE (DELTASONE) 10 MG tablet Take 4 tablets (40 mg total) by mouth once daily for 3 days, THEN 3 tablets (30 mg total) once daily for 3 days, THEN 2 tablets (20 mg total) once daily for 3 days, THEN 1 tablet (10 mg total) once daily for 3 days. Take 4 tabs x 3 days, then Take 2 tabs x 3 days, then Take 1 tab x 3 days.. 30 tablet 8/24/2022 9/5/2022 DANICA Rehman        Follow-up Information     Follow up With Specialties Details Why Contact Info    Jayant Castro MD Family Medicine, Emergency Medicine In 1 week  [address] [facility]  [facility]  [facility] [id number]  [phone]             DANICA Rehman  08/24/22 1488

## 2022-08-25 ENCOUNTER — TELEPHONE (OUTPATIENT)
Dept: EMERGENCY MEDICINE | Facility: HOSPITAL | Age: 82
End: 2022-08-25
Payer: MEDICARE

## 2022-08-31 ENCOUNTER — OFFICE VISIT (OUTPATIENT)
Dept: FAMILY MEDICINE | Facility: CLINIC | Age: 82
End: 2022-08-31
Payer: MEDICARE

## 2022-08-31 VITALS
WEIGHT: 224 LBS | BODY MASS INDEX: 30.34 KG/M2 | HEART RATE: 78 BPM | SYSTOLIC BLOOD PRESSURE: 130 MMHG | HEIGHT: 72 IN | DIASTOLIC BLOOD PRESSURE: 60 MMHG | OXYGEN SATURATION: 93 % | RESPIRATION RATE: 16 BRPM

## 2022-08-31 DIAGNOSIS — N32.81 OVERACTIVE BLADDER: Primary | ICD-10-CM

## 2022-08-31 DIAGNOSIS — J44.9 CHRONIC OBSTRUCTIVE PULMONARY DISEASE, UNSPECIFIED COPD TYPE: Chronic | ICD-10-CM

## 2022-08-31 PROCEDURE — 1160F RVW MEDS BY RX/DR IN RCRD: CPT | Mod: ,,, | Performed by: FAMILY MEDICINE

## 2022-08-31 PROCEDURE — 1159F PR MEDICATION LIST DOCUMENTED IN MEDICAL RECORD: ICD-10-PCS | Mod: ,,, | Performed by: FAMILY MEDICINE

## 2022-08-31 PROCEDURE — 1101F PR PT FALLS ASSESS DOC 0-1 FALLS W/OUT INJ PAST YR: ICD-10-PCS | Mod: ,,, | Performed by: FAMILY MEDICINE

## 2022-08-31 PROCEDURE — 3075F PR MOST RECENT SYSTOLIC BLOOD PRESS GE 130-139MM HG: ICD-10-PCS | Mod: ,,, | Performed by: FAMILY MEDICINE

## 2022-08-31 PROCEDURE — 1126F PR PAIN SEVERITY QUANTIFIED, NO PAIN PRESENT: ICD-10-PCS | Mod: ,,, | Performed by: FAMILY MEDICINE

## 2022-08-31 PROCEDURE — 1160F PR REVIEW ALL MEDS BY PRESCRIBER/CLIN PHARMACIST DOCUMENTED: ICD-10-PCS | Mod: ,,, | Performed by: FAMILY MEDICINE

## 2022-08-31 PROCEDURE — 1126F AMNT PAIN NOTED NONE PRSNT: CPT | Mod: ,,, | Performed by: FAMILY MEDICINE

## 2022-08-31 PROCEDURE — 3288F FALL RISK ASSESSMENT DOCD: CPT | Mod: ,,, | Performed by: FAMILY MEDICINE

## 2022-08-31 PROCEDURE — 99213 OFFICE O/P EST LOW 20 MIN: CPT | Mod: ,,, | Performed by: FAMILY MEDICINE

## 2022-08-31 PROCEDURE — 3078F PR MOST RECENT DIASTOLIC BLOOD PRESSURE < 80 MM HG: ICD-10-PCS | Mod: ,,, | Performed by: FAMILY MEDICINE

## 2022-08-31 PROCEDURE — 3078F DIAST BP <80 MM HG: CPT | Mod: ,,, | Performed by: FAMILY MEDICINE

## 2022-08-31 PROCEDURE — 3288F PR FALLS RISK ASSESSMENT DOCUMENTED: ICD-10-PCS | Mod: ,,, | Performed by: FAMILY MEDICINE

## 2022-08-31 PROCEDURE — 3075F SYST BP GE 130 - 139MM HG: CPT | Mod: ,,, | Performed by: FAMILY MEDICINE

## 2022-08-31 PROCEDURE — 1159F MED LIST DOCD IN RCRD: CPT | Mod: ,,, | Performed by: FAMILY MEDICINE

## 2022-08-31 PROCEDURE — 1101F PT FALLS ASSESS-DOCD LE1/YR: CPT | Mod: ,,, | Performed by: FAMILY MEDICINE

## 2022-08-31 PROCEDURE — 99213 PR OFFICE/OUTPT VISIT, EST, LEVL III, 20-29 MIN: ICD-10-PCS | Mod: ,,, | Performed by: FAMILY MEDICINE

## 2022-08-31 RX ORDER — MIRABEGRON 25 MG/1
25 TABLET, FILM COATED, EXTENDED RELEASE ORAL DAILY
Qty: 30 TABLET | Refills: 11 | Status: SHIPPED | OUTPATIENT
Start: 2022-08-31 | End: 2023-08-31

## 2022-08-31 NOTE — PROGRESS NOTES
Jayant Castro MD        PATIENT NAME: Nathan Hinton  : 1940  DATE: 22  MRN: [id number]      Billing Provider: Jayant Castro MD  Level of Service: MI OFFICE/OUTPT VISIT, EST, LEVL III, 20-29 MIN  Patient PCP Information       Provider PCP Type    Jayant Castro MD General            Reason for Visit / Chief Complaint: Shortness of Breath (ER follow up was in last Wednesday with SOB was given breathing treatment and put on steroids doing better)       Update PCP  Update Chief Complaint         History of Present Illness / Problem Focused Workflow     Nathan Hinton presents to the clinic with Shortness of Breath (ER follow up was in last Wednesday with SOB was given breathing treatment and put on steroids doing better)     Had sudden onset SOB.  Seen in ER and worked up and sent home on steroids.  Much better now.  Has seen Dr. Pang and Dr. Alicea in the past.  Patient complains of urinary urgency throughout the day particularly worrisome at night.  He does use Flomax.      Review of Systems     Review of Systems   Constitutional:  Negative for activity change, appetite change, fever and unexpected weight change.   HENT:  Negative for congestion, rhinorrhea, sinus pressure, sinus pain, sore throat and trouble swallowing.    Eyes:  Negative for photophobia, pain, discharge and visual disturbance.   Respiratory:  Positive for shortness of breath. Negative for cough, chest tightness, wheezing and stridor.    Cardiovascular:  Negative for chest pain, palpitations and leg swelling.   Gastrointestinal:  Negative for abdominal pain, blood in stool, constipation, diarrhea and nausea.   Endocrine: Negative for polydipsia, polyphagia and polyuria.   Genitourinary:  Positive for frequency and urgency. Negative for difficulty urinating, flank pain and hematuria.   Musculoskeletal:  Negative for arthralgias and neck pain.   Skin:  Negative for rash.   Allergic/Immunologic: Negative for food allergies.    Neurological:  Negative for dizziness, tremors, seizures, syncope, weakness (global weakness) and headaches.   Psychiatric/Behavioral:  Negative for behavioral problems, confusion, decreased concentration, dysphoric mood and hallucinations. The patient is not nervous/anxious.       Medical / Social / Family History     Past Medical History:   Diagnosis Date    #[id number]     COPD (chronic obstructive pulmonary disease)     Hypertension     Sarcoma        Past Surgical History:   Procedure Laterality Date    ABDOMINAL AORTIC ANEURYSM REPAIR      BACK SURGERY      FLEXOR TENDON REPAIR Right 11/4/2021    Procedure: REPAIR, TENDON, FLEXOR;  Surgeon: Dani Degroot MD;  Location: [facility];  Service: Orthopedics;  Laterality: Right;    SURGICAL REMOVAL OF SARCOMA      TRIGGER FINGER RELEASE Right 11/4/2021    Procedure: RELEASE, TRIGGER FINGER;  Surgeon: Dani Degroot MD;  Location: [facility];  Service: Orthopedics;  Laterality: Right;    VASCULAR SURGERY         Social History    reports that he has quit smoking. His smokeless tobacco use includes chew. He reports current alcohol use. He reports that he does not use drugs.    Family History  's family history is not on file.    Medications and Allergies     Medications  No outpatient medications have been marked as taking for the 8/31/22 encounter (Office Visit) with Jayant Castro MD.       Allergies  Review of patient's allergies indicates:   Allergen Reactions    Sulfa (sulfonamide antibiotics) Hives       Physical Examination     Vitals:    08/31/22 0909   BP: 130/60   Pulse: 78   Resp: 16     Physical Exam  Constitutional:       General: He is not in acute distress.     Appearance: Normal appearance.   HENT:      Head: Normocephalic.      Right Ear: Tympanic membrane and ear canal normal.      Left Ear: Tympanic membrane and ear canal normal.      Nose: Nose normal.      Mouth/Throat:      Mouth: Mucous membranes are moist.       Pharynx: No oropharyngeal exudate.   Eyes:      Extraocular Movements: Extraocular movements intact.      Pupils: Pupils are equal, round, and reactive to light.   Cardiovascular:      Rate and Rhythm: Normal rate and regular rhythm.      Heart sounds: No murmur heard.  Pulmonary:      Effort: Pulmonary effort is normal.      Breath sounds: Normal breath sounds. No wheezing.   Abdominal:      General: Abdomen is flat. Bowel sounds are normal.      Palpations: Abdomen is soft.      Hernia: No hernia is present.   Musculoskeletal:         General: Normal range of motion.      Cervical back: Normal range of motion and neck supple.      Right lower leg: No edema.      Left lower leg: No edema.   Lymphadenopathy:      Cervical: No cervical adenopathy.   Skin:     General: Skin is warm and dry.      Coloration: Skin is not jaundiced.      Findings: No lesion.   Neurological:      General: No focal deficit present.      Mental Status: He is alert and oriented to person, place, and time.      Cranial Nerves: No cranial nerve deficit.      Gait: Gait normal.   Psychiatric:         Mood and Affect: Mood normal.         Behavior: Behavior normal.         Judgment: Judgment normal.        Assessment and Plan (including Health Maintenance)      Problem List  Smart Sets  Document Outside HM   :    Plan:   Overactive bladder  -     mirabegron (MYRBETRIQ) 25 mg Tb24 ER tablet; Take 1 tablet (25 mg total) by mouth once daily.  Dispense: 30 tablet; Refill: 11    Chronic obstructive pulmonary disease, unspecified COPD type         Health Maintenance Due   Topic Date Due    Pneumococcal Vaccines (Age 65+) (1 - PCV) Never done    TETANUS VACCINE  Never done    Shingles Vaccine (1 of 2) Never done    COVID-19 Vaccine (3 - Booster for Dejan series) 04/02/2022       Problem List Items Addressed This Visit          Pulmonary    COPD (chronic obstructive pulmonary disease) (Chronic)     Other Visit Diagnoses       Overactive bladder    -   Primary    Relevant Medications    mirabegron (MYRBETRIQ) 25 mg Tb24 ER tablet            Health Maintenance Topics with due status: Not Due       Topic Last Completion Date    Lipid Panel 08/03/2020    Influenza Vaccine 10/15/2021       No future appointments.     There are no Patient Instructions on file for this visit.  Follow up in about 3 months (around 11/30/2022) for routine followup.     Signature:  Jayant Castro MD      Date of encounter: 8/31/22

## 2022-09-07 ENCOUNTER — OFFICE VISIT (OUTPATIENT)
Dept: FAMILY MEDICINE | Facility: CLINIC | Age: 82
End: 2022-09-07
Payer: MEDICARE

## 2022-09-07 VITALS
RESPIRATION RATE: 18 BRPM | HEART RATE: 90 BPM | SYSTOLIC BLOOD PRESSURE: 118 MMHG | DIASTOLIC BLOOD PRESSURE: 60 MMHG | HEIGHT: 72 IN | BODY MASS INDEX: 30.18 KG/M2 | OXYGEN SATURATION: 93 % | WEIGHT: 222.81 LBS

## 2022-09-07 DIAGNOSIS — R10.84 GENERALIZED ABDOMINAL PAIN: Primary | ICD-10-CM

## 2022-09-07 LAB
ALBUMIN SERPL BCP-MCNC: 3.5 G/DL (ref 3.5–5)
ALBUMIN/GLOB SERPL: 1 {RATIO}
ALP SERPL-CCNC: 93 U/L (ref 45–115)
ALT SERPL W P-5'-P-CCNC: 28 U/L (ref 16–61)
AMYLASE SERPL-CCNC: 82 U/L (ref 25–115)
ANION GAP SERPL CALCULATED.3IONS-SCNC: 8 MMOL/L (ref 7–16)
AST SERPL W P-5'-P-CCNC: 21 U/L (ref 15–37)
BASOPHILS # BLD AUTO: 0.04 K/UL (ref 0–0.2)
BASOPHILS NFR BLD AUTO: 0.4 % (ref 0–1)
BILIRUB SERPL-MCNC: 0.7 MG/DL (ref ?–1.2)
BILIRUB UR QL STRIP: NEGATIVE
BUN SERPL-MCNC: 37 MG/DL (ref 7–18)
BUN/CREAT SERPL: 26 (ref 6–20)
CALCIUM SERPL-MCNC: 9.5 MG/DL (ref 8.5–10.1)
CHLORIDE SERPL-SCNC: 102 MMOL/L (ref 98–107)
CLARITY UR: CLEAR
CO2 SERPL-SCNC: 30 MMOL/L (ref 21–32)
COLOR UR: YELLOW
CREAT SERPL-MCNC: 1.44 MG/DL (ref 0.7–1.3)
DIFFERENTIAL METHOD BLD: ABNORMAL
EGFR (NO RACE VARIABLE) (RUSH/TITUS): 48 ML/MIN/1.73M²
EOSINOPHIL # BLD AUTO: 0.33 K/UL (ref 0–0.5)
EOSINOPHIL NFR BLD AUTO: 2.9 % (ref 1–4)
ERYTHROCYTE [DISTWIDTH] IN BLOOD BY AUTOMATED COUNT: 13.2 % (ref 11.5–14.5)
GLOBULIN SER-MCNC: 3.5 G/DL (ref 2–4)
GLUCOSE SERPL-MCNC: 114 MG/DL (ref 74–106)
GLUCOSE UR STRIP-MCNC: NORMAL MG/DL
HCT VFR BLD AUTO: 41.3 % (ref 40–54)
HGB BLD-MCNC: 13.4 G/DL (ref 13.5–18)
IMM GRANULOCYTES # BLD AUTO: 0.06 K/UL (ref 0–0.04)
IMM GRANULOCYTES NFR BLD: 0.5 % (ref 0–0.4)
KETONES UR STRIP-SCNC: NEGATIVE MG/DL
LEUKOCYTE ESTERASE UR QL STRIP: NEGATIVE
LIPASE SERPL-CCNC: 456 U/L (ref 73–393)
LYMPHOCYTES # BLD AUTO: 3.12 K/UL (ref 1–4.8)
LYMPHOCYTES NFR BLD AUTO: 27.5 % (ref 27–41)
MCH RBC QN AUTO: 31.8 PG (ref 27–31)
MCHC RBC AUTO-ENTMCNC: 32.4 G/DL (ref 32–36)
MCV RBC AUTO: 98.1 FL (ref 80–96)
MONOCYTES # BLD AUTO: 1 K/UL (ref 0–0.8)
MONOCYTES NFR BLD AUTO: 8.8 % (ref 2–6)
MPC BLD CALC-MCNC: 9.8 FL (ref 9.4–12.4)
NEUTROPHILS # BLD AUTO: 6.81 K/UL (ref 1.8–7.7)
NEUTROPHILS NFR BLD AUTO: 59.9 % (ref 53–65)
NITRITE UR QL STRIP: NEGATIVE
NRBC # BLD AUTO: 0 X10E3/UL
NRBC, AUTO (.00): 0 %
PH UR STRIP: 5.5 PH UNITS
PLATELET # BLD AUTO: 185 K/UL (ref 150–400)
POTASSIUM SERPL-SCNC: 4.4 MMOL/L (ref 3.5–5.1)
PROT SERPL-MCNC: 7 G/DL (ref 6.4–8.2)
PROT UR QL STRIP: NEGATIVE
RBC # BLD AUTO: 4.21 M/UL (ref 4.6–6.2)
RBC # UR STRIP: NEGATIVE /UL
SODIUM SERPL-SCNC: 136 MMOL/L (ref 136–145)
SP GR UR STRIP: 1.02
UROBILINOGEN UR STRIP-ACNC: NORMAL MG/DL
WBC # BLD AUTO: 11.36 K/UL (ref 4.5–11)

## 2022-09-07 PROCEDURE — 80053 COMPREHENSIVE METABOLIC PANEL: ICD-10-PCS | Mod: ,,, | Performed by: CLINICAL MEDICAL LABORATORY

## 2022-09-07 PROCEDURE — 3074F SYST BP LT 130 MM HG: CPT | Mod: ,,, | Performed by: FAMILY MEDICINE

## 2022-09-07 PROCEDURE — 99214 PR OFFICE/OUTPT VISIT, EST, LEVL IV, 30-39 MIN: ICD-10-PCS | Mod: ,,, | Performed by: FAMILY MEDICINE

## 2022-09-07 PROCEDURE — 3078F PR MOST RECENT DIASTOLIC BLOOD PRESSURE < 80 MM HG: ICD-10-PCS | Mod: ,,, | Performed by: FAMILY MEDICINE

## 2022-09-07 PROCEDURE — 82150 ASSAY OF AMYLASE: CPT | Mod: ,,, | Performed by: CLINICAL MEDICAL LABORATORY

## 2022-09-07 PROCEDURE — 82150 AMYLASE: ICD-10-PCS | Mod: ,,, | Performed by: CLINICAL MEDICAL LABORATORY

## 2022-09-07 PROCEDURE — 85025 CBC WITH DIFFERENTIAL: ICD-10-PCS | Mod: ,,, | Performed by: CLINICAL MEDICAL LABORATORY

## 2022-09-07 PROCEDURE — 3074F PR MOST RECENT SYSTOLIC BLOOD PRESSURE < 130 MM HG: ICD-10-PCS | Mod: ,,, | Performed by: FAMILY MEDICINE

## 2022-09-07 PROCEDURE — 83690 ASSAY OF LIPASE: CPT | Mod: ,,, | Performed by: CLINICAL MEDICAL LABORATORY

## 2022-09-07 PROCEDURE — 81003 URINALYSIS, REFLEX TO URINE CULTURE: ICD-10-PCS | Mod: QW,,, | Performed by: CLINICAL MEDICAL LABORATORY

## 2022-09-07 PROCEDURE — 1125F PR PAIN SEVERITY QUANTIFIED, PAIN PRESENT: ICD-10-PCS | Mod: ,,, | Performed by: FAMILY MEDICINE

## 2022-09-07 PROCEDURE — 1125F AMNT PAIN NOTED PAIN PRSNT: CPT | Mod: ,,, | Performed by: FAMILY MEDICINE

## 2022-09-07 PROCEDURE — 81003 URINALYSIS AUTO W/O SCOPE: CPT | Mod: QW,,, | Performed by: CLINICAL MEDICAL LABORATORY

## 2022-09-07 PROCEDURE — 3078F DIAST BP <80 MM HG: CPT | Mod: ,,, | Performed by: FAMILY MEDICINE

## 2022-09-07 PROCEDURE — 83690 LIPASE: ICD-10-PCS | Mod: ,,, | Performed by: CLINICAL MEDICAL LABORATORY

## 2022-09-07 PROCEDURE — 85025 COMPLETE CBC W/AUTO DIFF WBC: CPT | Mod: ,,, | Performed by: CLINICAL MEDICAL LABORATORY

## 2022-09-07 PROCEDURE — 80053 COMPREHEN METABOLIC PANEL: CPT | Mod: ,,, | Performed by: CLINICAL MEDICAL LABORATORY

## 2022-09-07 PROCEDURE — 99214 OFFICE O/P EST MOD 30 MIN: CPT | Mod: ,,, | Performed by: FAMILY MEDICINE

## 2022-09-07 NOTE — PROGRESS NOTES
Jayant Castro MD        PATIENT NAME: Nathan Hinton  : 1940  DATE: 22  MRN: [id number]      Billing Provider: Jayant Castro MD  Level of Service: [place] OFFICE/OUTPT VISIT, EST, LEVL IV, 30-39 MIN  Patient PCP Information       Provider PCP Type    Jayant Castro MD General            Reason for Visit / Chief Complaint: Abdominal Pain       Update PCP  Update Chief Complaint         History of Present Illness / Problem Focused Workflow     Nathan Hinton presents to the clinic with Abdominal Pain     Having low ant abdominal pain.  Nocturia x two.      Review of Systems     Review of Systems   Constitutional:  Negative for activity change, appetite change, fever and unexpected weight change.   HENT:  Negative for congestion, rhinorrhea, sinus pressure, sinus pain, sore throat and trouble swallowing.    Eyes:  Negative for photophobia, pain, discharge and visual disturbance.   Respiratory:  Negative for cough, chest tightness, wheezing and stridor.    Cardiovascular:  Negative for chest pain, palpitations and leg swelling.   Gastrointestinal:  Positive for abdominal pain. Negative for blood in stool, constipation, diarrhea and nausea.   Endocrine: Negative for polydipsia, polyphagia and polyuria.   Genitourinary:  Negative for difficulty urinating, flank pain and hematuria.   Musculoskeletal:  Negative for arthralgias and neck pain.   Skin:  Negative for rash.   Allergic/Immunologic: Negative for food allergies.   Neurological:  Negative for dizziness, tremors, seizures, syncope, weakness (global weakness) and headaches.   Psychiatric/Behavioral:  Negative for behavioral problems, confusion, decreased concentration, dysphoric mood and hallucinations. The patient is not nervous/anxious.       Medical / Social / Family History     Past Medical History:   Diagnosis Date    #[id number]     COPD (chronic obstructive pulmonary disease)     Hypertension     Sarcoma        Past Surgical History:   Procedure Laterality  Date    ABDOMINAL AORTIC ANEURYSM REPAIR      BACK SURGERY      FLEXOR TENDON REPAIR Right 11/4/2021    Procedure: REPAIR, TENDON, FLEXOR;  Surgeon: Dani Degroot MD;  Location: [facility];  Service: Orthopedics;  Laterality: Right;    SURGICAL REMOVAL OF SARCOMA      TRIGGER FINGER RELEASE Right 11/4/2021    Procedure: RELEASE, TRIGGER FINGER;  Surgeon: Dani Degroot MD;  Location: [facility] OR;  Service: Orthopedics;  Laterality: Right;    VASCULAR SURGERY         Social History    reports that he has quit smoking. His smokeless tobacco use includes chew. He reports current alcohol use. He reports that he does not use drugs.    Family History  's family history is not on file.    Medications and Allergies     Medications  No outpatient medications have been marked as taking for the 9/7/22 encounter (Office Visit) with Jayant Castro MD.       Allergies  Review of patient's allergies indicates:   Allergen Reactions    Sulfa (sulfonamide antibiotics) Hives       Physical Examination     Vitals:    09/07/22 1009   BP: 118/60   Pulse: 90   Resp: 18     Physical Exam  Constitutional:       General: He is not in acute distress.     Appearance: Normal appearance.   HENT:      Head: Normocephalic.      Right Ear: Tympanic membrane and ear canal normal.      Left Ear: Tympanic membrane and ear canal normal.      Nose: Nose normal.      Mouth/Throat:      Mouth: Mucous membranes are moist.      Pharynx: No oropharyngeal exudate.   Eyes:      Extraocular Movements: Extraocular movements intact.      Pupils: Pupils are equal, round, and reactive to light.   Cardiovascular:      Rate and Rhythm: Normal rate and regular rhythm.      Heart sounds: No murmur heard.  Pulmonary:      Effort: Pulmonary effort is normal.      Breath sounds: Normal breath sounds. No wheezing.   Abdominal:      General: Abdomen is flat. Bowel sounds are normal.      Palpations: Abdomen is soft.      Hernia: No hernia  is present.   Musculoskeletal:         General: Normal range of motion.      Cervical back: Normal range of motion and neck supple.      Right lower leg: No edema.      Left lower leg: No edema.   Lymphadenopathy:      Cervical: No cervical adenopathy.   Skin:     General: Skin is warm and dry.      Coloration: Skin is not jaundiced.      Findings: No lesion.   Neurological:      General: No focal deficit present.      Mental Status: He is alert and oriented to person, place, and time.      Cranial Nerves: No cranial nerve deficit.      Gait: Gait normal.   Psychiatric:         Mood and Affect: Mood normal.         Behavior: Behavior normal.         Judgment: Judgment normal.        Assessment and Plan (including Health Maintenance)      Problem List  Smart Sets  Document Outside HM   :    Plan:   Generalized abdominal pain  -     CBC Auto Differential; Future; Expected date: 03/07/2023  -     Comprehensive Metabolic Panel; Future; Expected date: 03/07/2023  -     Lipase; Future; Expected date: 09/07/2022  -     Amylase; Future; Expected date: 09/07/2022  -     Urinalysis, Reflex to Urine Culture  -     CT Abdomen Pelvis  Without Contrast; Future; Expected date: 09/08/2022         Health Maintenance Due   Topic Date Due    Pneumococcal Vaccines (Age 65+) (1 - PCV) Never done    TETANUS VACCINE  Never done    Shingles Vaccine (1 of 2) Never done    COVID-19 Vaccine (3 - Booster for Dejan series) 04/02/2022    Influenza Vaccine (1) 09/01/2022       Problem List Items Addressed This Visit    None  Visit Diagnoses       Generalized abdominal pain    -  Primary    Relevant Orders    CBC Auto Differential (Completed)    Comprehensive Metabolic Panel (Completed)    Lipase (Completed)    Amylase (Completed)    Urinalysis, Reflex to Urine Culture (Completed)    CT Abdomen Pelvis  Without Contrast            Health Maintenance Topics with due status: Not Due       Topic Last Completion Date    Lipid Panel 08/03/2020        Future Appointments   Date Time Provider Department Center   9/16/2022  8:00 AM [facility] CT1 OB [facility] [place]        There are no Patient Instructions on file for this visit.  Follow up if symptoms worsen or fail to improve.     Signature:  Jayant Castro MD      Date of encounter: 9/7/22

## 2022-09-16 ENCOUNTER — HOSPITAL ENCOUNTER (OUTPATIENT)
Dept: RADIOLOGY | Facility: HOSPITAL | Age: 82
Discharge: HOME | End: 2022-09-16
Attending: FAMILY MEDICINE
Payer: MEDICARE

## 2022-09-16 DIAGNOSIS — N28.1 COMPLEX RENAL CYST: Primary | ICD-10-CM

## 2022-09-16 DIAGNOSIS — R10.84 GENERALIZED ABDOMINAL PAIN: ICD-10-CM

## 2022-09-16 PROCEDURE — 74176 CT ABDOMEN PELVIS WITHOUT CONTRAST: ICD-10-PCS | Mod: 26,,, | Performed by: RADIOLOGY

## 2022-09-16 PROCEDURE — 74176 CT ABD & PELVIS W/O CONTRAST: CPT | Mod: TC

## 2022-09-16 PROCEDURE — 74176 CT ABD & PELVIS W/O CONTRAST: CPT | Mod: 26,,, | Performed by: RADIOLOGY

## 2022-10-05 ENCOUNTER — OFFICE VISIT (OUTPATIENT)
Dept: FAMILY MEDICINE | Facility: CLINIC | Age: 82
End: 2022-10-05
Payer: MEDICARE

## 2022-10-05 ENCOUNTER — HOSPITAL ENCOUNTER (OUTPATIENT)
Dept: RADIOLOGY | Facility: HOSPITAL | Age: 82
Discharge: HOME | End: 2022-10-05
Attending: FAMILY MEDICINE
Payer: MEDICARE

## 2022-10-05 VITALS
OXYGEN SATURATION: 95 % | WEIGHT: 228.38 LBS | HEIGHT: 72 IN | RESPIRATION RATE: 18 BRPM | DIASTOLIC BLOOD PRESSURE: 65 MMHG | HEART RATE: 80 BPM | SYSTOLIC BLOOD PRESSURE: 135 MMHG | BODY MASS INDEX: 30.93 KG/M2

## 2022-10-05 DIAGNOSIS — I10 PRIMARY HYPERTENSION: Chronic | ICD-10-CM

## 2022-10-05 DIAGNOSIS — H61.91 SKIN LESION OF RIGHT EAR: Primary | ICD-10-CM

## 2022-10-05 DIAGNOSIS — N28.1 COMPLEX RENAL CYST: ICD-10-CM

## 2022-10-05 PROCEDURE — 3075F PR MOST RECENT SYSTOLIC BLOOD PRESS GE 130-139MM HG: ICD-10-PCS | Mod: ,,, | Performed by: FAMILY MEDICINE

## 2022-10-05 PROCEDURE — 76770 US EXAM ABDO BACK WALL COMP: CPT | Mod: TC

## 2022-10-05 PROCEDURE — 3078F DIAST BP <80 MM HG: CPT | Mod: ,,, | Performed by: FAMILY MEDICINE

## 2022-10-05 PROCEDURE — 1160F PR REVIEW ALL MEDS BY PRESCRIBER/CLIN PHARMACIST DOCUMENTED: ICD-10-PCS | Mod: ,,, | Performed by: FAMILY MEDICINE

## 2022-10-05 PROCEDURE — 1126F AMNT PAIN NOTED NONE PRSNT: CPT | Mod: ,,, | Performed by: FAMILY MEDICINE

## 2022-10-05 PROCEDURE — 76770 US EXAM ABDO BACK WALL COMP: CPT | Mod: 26,,, | Performed by: RADIOLOGY

## 2022-10-05 PROCEDURE — 1159F PR MEDICATION LIST DOCUMENTED IN MEDICAL RECORD: ICD-10-PCS | Mod: ,,, | Performed by: FAMILY MEDICINE

## 2022-10-05 PROCEDURE — 3075F SYST BP GE 130 - 139MM HG: CPT | Mod: ,,, | Performed by: FAMILY MEDICINE

## 2022-10-05 PROCEDURE — 76770 US KIDNEY: ICD-10-PCS | Mod: 26,,, | Performed by: RADIOLOGY

## 2022-10-05 PROCEDURE — 99213 PR OFFICE/OUTPT VISIT, EST, LEVL III, 20-29 MIN: ICD-10-PCS | Mod: ,,, | Performed by: FAMILY MEDICINE

## 2022-10-05 PROCEDURE — 3078F PR MOST RECENT DIASTOLIC BLOOD PRESSURE < 80 MM HG: ICD-10-PCS | Mod: ,,, | Performed by: FAMILY MEDICINE

## 2022-10-05 PROCEDURE — 1159F MED LIST DOCD IN RCRD: CPT | Mod: ,,, | Performed by: FAMILY MEDICINE

## 2022-10-05 PROCEDURE — 1126F PR PAIN SEVERITY QUANTIFIED, NO PAIN PRESENT: ICD-10-PCS | Mod: ,,, | Performed by: FAMILY MEDICINE

## 2022-10-05 PROCEDURE — 99213 OFFICE O/P EST LOW 20 MIN: CPT | Mod: ,,, | Performed by: FAMILY MEDICINE

## 2022-10-05 PROCEDURE — 1160F RVW MEDS BY RX/DR IN RCRD: CPT | Mod: ,,, | Performed by: FAMILY MEDICINE

## 2022-10-05 NOTE — PROGRESS NOTES
Jayant Castro MD        PATIENT NAME: Nathan Hinton  : 1940  DATE: 10/5/22  MRN: [id number]      Billing Provider: Jayant Castro MD  Level of Service: [place] OFFICE/OUTPT VISIT, EST, LEVL III, 20-29 MIN  Patient PCP Information       Provider PCP Type    Jayant Castro MD General            Reason for Visit / Chief Complaint: Ear Problem (Patient states the place on his right ear has been there a week)       Update PCP  Update Chief Complaint         History of Present Illness / Problem Focused Workflow     Nathan Hinton presents to the clinic with Ear Problem (Patient states the place on his right ear has been there a week)     Overall followup.  Has a lesion on the right external ear which bleeds from time to time.    Review of Systems     Review of Systems   Constitutional:  Negative for activity change, appetite change, fever and unexpected weight change.   HENT:  Negative for congestion, rhinorrhea, sinus pressure, sinus pain, sore throat and trouble swallowing.    Eyes:  Negative for photophobia, pain, discharge and visual disturbance.   Respiratory:  Negative for cough, chest tightness, wheezing and stridor.    Cardiovascular:  Negative for chest pain, palpitations and leg swelling.   Gastrointestinal:  Negative for abdominal pain, blood in stool, constipation, diarrhea and nausea.   Endocrine: Negative for polydipsia, polyphagia and polyuria.   Genitourinary:  Negative for difficulty urinating, flank pain and hematuria.   Musculoskeletal:  Negative for arthralgias and neck pain.   Skin:  Positive for wound. Negative for rash.   Allergic/Immunologic: Negative for food allergies.   Neurological:  Negative for dizziness, tremors, seizures, syncope, weakness (global weakness) and headaches.   Psychiatric/Behavioral:  Negative for behavioral problems, confusion, decreased concentration, dysphoric mood and hallucinations. The patient is not nervous/anxious.       Medical / Social / Family History     Past  Medical History:   Diagnosis Date    #[id number]     COPD (chronic obstructive pulmonary disease)     Hypertension     Sarcoma        Past Surgical History:   Procedure Laterality Date    ABDOMINAL AORTIC ANEURYSM REPAIR      BACK SURGERY      FLEXOR TENDON REPAIR Right 11/4/2021    Procedure: REPAIR, TENDON, FLEXOR;  Surgeon: Dani Degroot MD;  Location: [facility];  Service: Orthopedics;  Laterality: Right;    SURGICAL REMOVAL OF SARCOMA      TRIGGER FINGER RELEASE Right 11/4/2021    Procedure: RELEASE, TRIGGER FINGER;  Surgeon: Dani Degroot MD;  Location: [facility];  Service: Orthopedics;  Laterality: Right;    VASCULAR SURGERY         Social History    reports that he has quit smoking. His smokeless tobacco use includes chew. He reports current alcohol use. He reports that he does not use drugs.    Family History  's family history is not on file.    Medications and Allergies     Medications  No outpatient medications have been marked as taking for the 10/5/22 encounter (Office Visit) with Jayant Castro MD.       Allergies  Review of patient's allergies indicates:   Allergen Reactions    Sulfa (sulfonamide antibiotics) Hives       Physical Examination     Vitals:    10/05/22 1507   BP: 135/65   Pulse: 80   Resp: 18     Physical Exam  Constitutional:       General: He is not in acute distress.     Appearance: Normal appearance.   HENT:      Head: Normocephalic.      Right Ear: Tympanic membrane and ear canal normal.      Left Ear: Tympanic membrane and ear canal normal.      Nose: Nose normal.      Mouth/Throat:      Mouth: Mucous membranes are moist.      Pharynx: No oropharyngeal exudate.   Eyes:      Extraocular Movements: Extraocular movements intact.      Pupils: Pupils are equal, round, and reactive to light.   Cardiovascular:      Rate and Rhythm: Normal rate and regular rhythm.      Heart sounds: No murmur heard.  Pulmonary:      Effort: Pulmonary effort is normal.       Breath sounds: Normal breath sounds. No wheezing.   Abdominal:      General: Abdomen is flat. Bowel sounds are normal.      Palpations: Abdomen is soft.      Hernia: No hernia is present.   Musculoskeletal:         General: Normal range of motion.      Cervical back: Normal range of motion and neck supple.      Right lower leg: No edema.      Left lower leg: No edema.   Lymphadenopathy:      Cervical: No cervical adenopathy.   Skin:     General: Skin is warm and dry.      Coloration: Skin is not jaundiced.      Findings: Lesion present.      Comments: Patient has a 4 mm papular erythematous lesion with discrete borders on the right external ear   Neurological:      General: No focal deficit present.      Mental Status: He is alert and oriented to person, place, and time.      Cranial Nerves: No cranial nerve deficit.      Gait: Gait normal.   Psychiatric:         Mood and Affect: Mood normal.         Behavior: Behavior normal.         Judgment: Judgment normal.        Assessment and Plan (including Health Maintenance)      Problem List  Smart [employer]  Document Outside HM   :    Plan:   Skin lesion of right ear  -     Ambulatory referral/consult to Dermatology; Future; Expected date: 10/12/2022    Primary hypertension         Health Maintenance Due   Topic Date Due    Pneumococcal Vaccines (Age 65+) (1 - PCV) Never done    TETANUS VACCINE  Never done    Shingles Vaccine (1 of 2) Never done    COVID-19 Vaccine (3 - Booster for Dejan series) 01/27/2022    Influenza Vaccine (1) 09/01/2022       Problem List Items Addressed This Visit          Cardiac/Vascular    Hypertension (Chronic)     Other Visit Diagnoses       Skin lesion of right ear    -  Primary    Relevant Orders    Ambulatory referral/consult to Dermatology            Health Maintenance Topics with due status: Not Due       Topic Last Completion Date    Lipid Panel 08/03/2020       Future Appointments   Date Time Provider Department Center   11/1/2022  9:45 AM  Hoda Rivera MD [facility]        There are no Patient Instructions on file for this visit.  Follow up if symptoms worsen or fail to improve.     Signature:  Jayant Castro MD      Date of encounter: 10/5/22

## 2022-11-01 ENCOUNTER — OFFICE VISIT (OUTPATIENT)
Dept: DERMATOLOGY | Facility: CLINIC | Age: 82
End: 2022-11-01
Payer: MEDICARE

## 2022-11-01 DIAGNOSIS — L57.0 ACTINIC KERATOSES: Primary | ICD-10-CM

## 2022-11-01 DIAGNOSIS — L30.9 ECZEMA, UNSPECIFIED TYPE: ICD-10-CM

## 2022-11-01 DIAGNOSIS — Z79.899 HIGH RISK MEDICATION USE: ICD-10-CM

## 2022-11-01 PROCEDURE — 17003 DESTRUCTION, PREMALIGNANT LESIONS; SECOND THROUGH 14 LESIONS: ICD-10-PCS | Mod: ,,, | Performed by: STUDENT IN AN ORGANIZED HEALTH CARE EDUCATION/TRAINING PROGRAM

## 2022-11-01 PROCEDURE — 99204 PR OFFICE/OUTPT VISIT, NEW, LEVL IV, 45-59 MIN: ICD-10-PCS | Mod: 25,,, | Performed by: STUDENT IN AN ORGANIZED HEALTH CARE EDUCATION/TRAINING PROGRAM

## 2022-11-01 PROCEDURE — 17000 DESTRUCT PREMALG LESION: CPT | Mod: ,,, | Performed by: STUDENT IN AN ORGANIZED HEALTH CARE EDUCATION/TRAINING PROGRAM

## 2022-11-01 PROCEDURE — 17000 PR DESTRUCTION(LASER SURGERY,CRYOSURGERY,CHEMOSURGERY),PREMALIGNANT LESIONS,FIRST LESION: ICD-10-PCS | Mod: ,,, | Performed by: STUDENT IN AN ORGANIZED HEALTH CARE EDUCATION/TRAINING PROGRAM

## 2022-11-01 PROCEDURE — 17003 DESTRUCT PREMALG LES 2-14: CPT | Mod: ,,, | Performed by: STUDENT IN AN ORGANIZED HEALTH CARE EDUCATION/TRAINING PROGRAM

## 2022-11-01 PROCEDURE — 99204 OFFICE O/P NEW MOD 45 MIN: CPT | Mod: 25,,, | Performed by: STUDENT IN AN ORGANIZED HEALTH CARE EDUCATION/TRAINING PROGRAM

## 2022-11-01 RX ORDER — TRIAMCINOLONE ACETONIDE 1 MG/G
OINTMENT TOPICAL 2 TIMES DAILY
Qty: 454 G | Refills: 5 | Status: SHIPPED | OUTPATIENT
Start: 2022-11-01 | End: 2023-02-14 | Stop reason: SDUPTHER

## 2022-11-01 RX ORDER — PREDNISONE 20 MG/1
TABLET ORAL
Qty: 25 TABLET | Refills: 0 | Status: SHIPPED | OUTPATIENT
Start: 2022-11-01 | End: 2022-11-22

## 2022-11-01 NOTE — PROGRESS NOTES
[facility]  Qamar Rivera MD    [address] [place] [address]  [phone]    Fax: [phone]    Patient Name: Nathan Hinton  Medical Record Number: [id number]  PCP: Jayant Castro MD  Age: 82 y.o. : 1940  Contact: There are no phone numbers on file.    CC: rash  History of Present Illness:     Nathan Hinton is a 82 y.o.  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  This has been present for three weeks. Symptoms include pruritus. No new medication except for myrbetriq which was prescribed two months ago. Previous treatments include triamcinolone cream 1% which was prescribed by Dr. Dobbs when the rash was present several years ago.. Other concerns today are skin lesion on the right ear. This has been present for over one month. Symptoms include crusty and scaly. Patient states it has resolved.       The patient has no other concerns today.    Review of Systems:     Unremarkable other than mentioned above.     Physical Exam:     General: Relaxed, oriented, alert    Skin examination of the scalp, face, neck, chest, back, abdomen, upper extremities and lower extremities were normal except for as listed below      Assessment and Plan:     1. Atopic Dermatitis   - eczematous plaques and papules located on the bilateral legs with lichenification    Status: inadequately controlled    Plan:   Topical Steroid: triamcinolone cream 1%  - prednisone 40 mg x 7 days, 20 mg x 7 days, 10 mg x 7 days      Counseling.    Skin care: Patient should bathe using lukewarm water with a mild cleanser and moisturize immediately after. Emollients should be applied at least 2-3 times daily. Avoid scented detergents or fabric softeners. Keep fingernails short. Avoid excessive hand washing.  Expectations: The patient is aware that eczema is chronic in nature and can improve with  moisturizers and topical steroids and worsen with stress, scented soaps, detergents, scratching, dry skin, changes in weather and skin infections.    Contact office if: Eczema worsens or fails to improve despite several weeks of treatment; patient develops skin infections (such as: yellow honey colored crusts or cold sores).      2. High Risk Medication Monitoring : The risks and benefits of the medication were reviewed in full with the patient. Should any side effects occur, the patient will stop the medication and contact me immediately.       Prednisone Counseling: I discussed with the patient the risks of prolonged use of prednisone including but not limited to weight gain, insomnia, osteoporosis, mood changes, diabetes, susceptibility to infection, glaucoma and high blood pressure. In cases where prednisone use is prolonged, patients should be monitored with blood pressure checks, serum glucose levels and an eye exam. Additionally, the patient may need to be placed on GI prophylaxis, PCP prophylaxis, and calcium and vitamin D supplementation and/or a bisphosphonate. The patient verbalized understanding of the proper use and the possible adverse effects of prednisone. All of the patient's questions and concerns were addressed.      3. Actinic Keratoses  Erythematous, scaly papules on bilateral forearms,    Plan: Liquid Nitrogen.  A total of 5 lesions were treated with liquid nitrogen for 2 freeze-thaw cycles lasting 5 seconds, located on the above locations.   The patient's consent was obtained including but not limited to risks of crusting, scabbing,  blistering, scarring, darker or lighter pigmentary change, recurrence, incomplete removal and infection.    Counseling.  Sun protective clothing and broad spectrum sunscreen can prevent the formation of AK.   AKs can be treated with cryotherapy, photodynamic therapy, imiquimod, topical 5-FU.  Actinic Keratoses are precancerous proliferations that occur within sun  damaged skin. If untreated,  a small subset of AKs can develop into Squamous Cell Carcinoma.      Return to clinic in rash     AVS printed with patient instructions     Qamar Rivera MD   Mohs Surgery/Dermatologic Oncology  Dermatology

## 2022-11-04 ENCOUNTER — HOSPITAL ENCOUNTER (EMERGENCY)
Facility: HOSPITAL | Age: 82
Discharge: HOME | End: 2022-11-04
Attending: EMERGENCY MEDICINE
Payer: MEDICARE

## 2022-11-04 VITALS
OXYGEN SATURATION: 94 % | TEMPERATURE: 98 F | RESPIRATION RATE: 22 BRPM | SYSTOLIC BLOOD PRESSURE: 140 MMHG | HEIGHT: 72 IN | DIASTOLIC BLOOD PRESSURE: 72 MMHG | HEART RATE: 85 BPM | WEIGHT: 229 LBS | BODY MASS INDEX: 31.02 KG/M2

## 2022-11-04 DIAGNOSIS — Z20.828 EXPOSURE TO INFLUENZA: ICD-10-CM

## 2022-11-04 DIAGNOSIS — R03.0 ELEVATED BLOOD PRESSURE READING: Primary | ICD-10-CM

## 2022-11-04 LAB
FLUAV AG UPPER RESP QL IA.RAPID: NEGATIVE
FLUBV AG UPPER RESP QL IA.RAPID: NEGATIVE
SARS-COV+SARS-COV-2 AG RESP QL IA.RAPID: NEGATIVE

## 2022-11-04 PROCEDURE — 99283 PR EMERGENCY DEPT VISIT,LEVEL III: ICD-10-PCS | Mod: ,,, | Performed by: NURSE PRACTITIONER

## 2022-11-04 PROCEDURE — 99283 EMERGENCY DEPT VISIT LOW MDM: CPT | Mod: ,,, | Performed by: NURSE PRACTITIONER

## 2022-11-04 PROCEDURE — 87428 SARSCOV & INF VIR A&B AG IA: CPT | Performed by: NURSE PRACTITIONER

## 2022-11-04 PROCEDURE — 99283 EMERGENCY DEPT VISIT LOW MDM: CPT

## 2022-11-04 NOTE — ED TRIAGE NOTES
[facility] Patient reports BP there was in the 160s systolic.

## 2022-11-04 NOTE — ED PROVIDER NOTES
Encounter Date: 11/4/2022       History     Chief Complaint   Patient presents with    Hypertension     82 year old male presents to ED with complaint of dizziness and congestion. He was seen at PCP's office and had elevated BP of 160s and was sent for evaluation. Denies headache, chest pain, nausea/vomiting. Has cough and shortness of breath due to COPD. Denies worsening or changes from baseline. Endorses feeling congested and states his son's girlfriend in home tested positive for flu. He has also attended doctor's appointments this week. Denies fever, chills.     The history is provided by the patient. No  was used.   Hypertension   This is a chronic problem. The problem has been waxing and waning. Associated symptoms include dizziness. Pertinent negatives include no chest pain, no palpitations, no confusion, no blurred vision, no headaches and no shortness of breath.   Review of patient's allergies indicates:   Allergen Reactions    Sulfa (sulfonamide antibiotics) Hives     Past Medical History:   Diagnosis Date    #[id number]     COPD (chronic obstructive pulmonary disease)     Hypertension     Sarcoma      Past Surgical History:   Procedure Laterality Date    ABDOMINAL AORTIC ANEURYSM REPAIR      BACK SURGERY      FLEXOR TENDON REPAIR Right 11/4/2021    Procedure: REPAIR, TENDON, FLEXOR;  Surgeon: Dani Degroot MD;  Location: [facility];  Service: Orthopedics;  Laterality: Right;    SURGICAL REMOVAL OF SARCOMA      TRIGGER FINGER RELEASE Right 11/4/2021    Procedure: RELEASE, TRIGGER FINGER;  Surgeon: Dani Degroot MD;  Location: [facility];  Service: Orthopedics;  Laterality: Right;    VASCULAR SURGERY       History reviewed. No pertinent family history.  Social History     Tobacco Use    Smoking status: Former     Packs/day: 1.00     Years: 30.00     Pack years: 30.00     Types: Cigarettes    Smokeless tobacco: Current     Types: Chew   Substance Use Topics     Alcohol use: Yes    Drug use: Never     Review of Systems   Constitutional:  Negative for chills and fatigue.   HENT:  Positive for congestion. Negative for sinus pressure and sinus pain.    Eyes:  Negative for blurred vision, photophobia and visual disturbance.   Respiratory:  Negative for cough and shortness of breath.    Cardiovascular:  Negative for chest pain and palpitations.   Gastrointestinal:  Negative for nausea and vomiting.   Endocrine: Negative for cold intolerance and heat intolerance.   Genitourinary:  Negative for difficulty urinating and dysuria.   Musculoskeletal:  Negative for arthralgias and gait problem.   Neurological:  Positive for dizziness. Negative for weakness and headaches.   Hematological:  Negative for adenopathy. Does not bruise/bleed easily.   Psychiatric/Behavioral:  Negative for agitation and confusion.    All other systems reviewed and are negative.    Physical Exam     Initial Vitals [11/04/22 1436]   BP Pulse Resp Temp SpO2   (!) 140/72 85 (!) 22 98.1 °F (36.7 °C) (!) 94 %      MAP       --         Physical Exam    Nursing note and vitals reviewed.  Constitutional: He appears well-developed and well-nourished.   HENT:   Head: Normocephalic and atraumatic.   Eyes: EOM are normal. Pupils are equal, round, and reactive to light.   Neck: Neck supple.   Normal range of motion.  Cardiovascular:  Normal rate and regular rhythm.           Pulmonary/Chest: He has no wheezes. He has no rhonchi.   Abdominal: Abdomen is soft. He exhibits no distension. There is no abdominal tenderness.   Musculoskeletal:         General: No tenderness or edema.      Cervical back: Normal range of motion and neck supple.     Neurological: He is alert and oriented to person, place, and time.   Skin: Skin is warm and dry. Capillary refill takes less than 2 seconds.   Psychiatric: He has a normal mood and affect. Thought content normal.       Medical Screening Exam   See Full Note    ED Course   Procedures  Labs  Reviewed   SARS-COV2 (COVID) W/ FLU ANTIGEN - Normal    Narrative:     Negative SARS-CoV results should not be used as the sole basis for treatment or patient management decisions; negative results should be considered in the context of a patient's recent exposures, history and the presene of clinical signs and symptoms consistent with COVID-19.  Negative results should be treated as presumptive and confirmed by molecular assay, if necessary for patient management.          Imaging Results    None          Medications - No data to display        APC / Resident Notes:   Declines being placed into room. Expresses not wanting to come to ED. Pending viral panel             Clinical Impression:   Final diagnoses:  [R03.0] Elevated blood pressure reading (Primary)  [Z20.828] Exposure to influenza      ED Disposition Condition    Discharge Stable          ED Prescriptions    None       Follow-up Information    None          DANICA Baca  11/04/22 1530       Marcelina Dick, DANICA  11/09/22 1411       Marcelina Dick, DANICA  11/18/22 1213

## 2022-11-09 DIAGNOSIS — Z71.89 COMPLEX CARE COORDINATION: ICD-10-CM

## 2022-11-14 ENCOUNTER — OFFICE VISIT (OUTPATIENT)
Dept: PULMONOLOGY | Facility: CLINIC | Age: 82
End: 2022-11-14
Payer: MEDICARE

## 2022-11-14 VITALS
SYSTOLIC BLOOD PRESSURE: 147 MMHG | BODY MASS INDEX: 31.02 KG/M2 | WEIGHT: 229 LBS | HEART RATE: 76 BPM | RESPIRATION RATE: 18 BRPM | HEIGHT: 72 IN | OXYGEN SATURATION: 95 % | DIASTOLIC BLOOD PRESSURE: 70 MMHG

## 2022-11-14 DIAGNOSIS — J44.9 CHRONIC OBSTRUCTIVE PULMONARY DISEASE, UNSPECIFIED COPD TYPE: Chronic | ICD-10-CM

## 2022-11-14 PROCEDURE — 3288F PR FALLS RISK ASSESSMENT DOCUMENTED: ICD-10-PCS | Mod: CPTII,,, | Performed by: INTERNAL MEDICINE

## 2022-11-14 PROCEDURE — 1159F MED LIST DOCD IN RCRD: CPT | Mod: CPTII,,, | Performed by: INTERNAL MEDICINE

## 2022-11-14 PROCEDURE — 3078F PR MOST RECENT DIASTOLIC BLOOD PRESSURE < 80 MM HG: ICD-10-PCS | Mod: CPTII,,, | Performed by: INTERNAL MEDICINE

## 2022-11-14 PROCEDURE — 3288F FALL RISK ASSESSMENT DOCD: CPT | Mod: CPTII,,, | Performed by: INTERNAL MEDICINE

## 2022-11-14 PROCEDURE — 3077F SYST BP >= 140 MM HG: CPT | Mod: CPTII,,, | Performed by: INTERNAL MEDICINE

## 2022-11-14 PROCEDURE — 1126F AMNT PAIN NOTED NONE PRSNT: CPT | Mod: CPTII,,, | Performed by: INTERNAL MEDICINE

## 2022-11-14 PROCEDURE — 99213 PR OFFICE/OUTPT VISIT, EST, LEVL III, 20-29 MIN: ICD-10-PCS | Mod: S$PBB,,, | Performed by: INTERNAL MEDICINE

## 2022-11-14 PROCEDURE — 1101F PR PT FALLS ASSESS DOC 0-1 FALLS W/OUT INJ PAST YR: ICD-10-PCS | Mod: CPTII,,, | Performed by: INTERNAL MEDICINE

## 2022-11-14 PROCEDURE — 99214 OFFICE O/P EST MOD 30 MIN: CPT | Mod: PBBFAC | Performed by: INTERNAL MEDICINE

## 2022-11-14 PROCEDURE — 1101F PT FALLS ASSESS-DOCD LE1/YR: CPT | Mod: CPTII,,, | Performed by: INTERNAL MEDICINE

## 2022-11-14 PROCEDURE — 3077F PR MOST RECENT SYSTOLIC BLOOD PRESSURE >= 140 MM HG: ICD-10-PCS | Mod: CPTII,,, | Performed by: INTERNAL MEDICINE

## 2022-11-14 PROCEDURE — 1126F PR PAIN SEVERITY QUANTIFIED, NO PAIN PRESENT: ICD-10-PCS | Mod: CPTII,,, | Performed by: INTERNAL MEDICINE

## 2022-11-14 PROCEDURE — 1159F PR MEDICATION LIST DOCUMENTED IN MEDICAL RECORD: ICD-10-PCS | Mod: CPTII,,, | Performed by: INTERNAL MEDICINE

## 2022-11-14 PROCEDURE — 3078F DIAST BP <80 MM HG: CPT | Mod: CPTII,,, | Performed by: INTERNAL MEDICINE

## 2022-11-14 PROCEDURE — 99213 OFFICE O/P EST LOW 20 MIN: CPT | Mod: S$PBB,,, | Performed by: INTERNAL MEDICINE

## 2022-11-14 NOTE — ASSESSMENT & PLAN NOTE
Seen him in the past for this he seems to be stable at the current time no new issues.  My plan would be to continue his Stiolto and rescue inhaler Ventolin no changes in regimen see him back in 6 months increase activity stay up-to-date on vaccinations

## 2022-11-14 NOTE — PROGRESS NOTES
Subjective:       Patient ID: Nathan Hinton is a 82 y.o. male.    Chief Complaint: COPD ([facility]ult [facility])    COPD  This is a chronic problem. The current episode started more than 1 year ago. The problem has been unchanged. Pertinent negatives include no abdominal pain, arthralgias, chest pain, chills, congestion, headaches or rash. The symptoms are aggravated by exertion.   Past Medical History:   Diagnosis Date    #[id number]     COPD (chronic obstructive pulmonary disease)     Hypertension     Sarcoma      Past Surgical History:   Procedure Laterality Date    ABDOMINAL AORTIC ANEURYSM REPAIR      BACK SURGERY      FLEXOR TENDON REPAIR Right 11/4/2021    Procedure: REPAIR, TENDON, FLEXOR;  Surgeon: Dani Degroot MD;  Location: [facility];  Service: Orthopedics;  Laterality: Right;    SURGICAL REMOVAL OF SARCOMA      TRIGGER FINGER RELEASE Right 11/4/2021    Procedure: RELEASE, TRIGGER FINGER;  Surgeon: Dani Degroot MD;  Location: [facility];  Service: Orthopedics;  Laterality: Right;    VASCULAR SURGERY       History reviewed. No pertinent family history.  Review of patient's allergies indicates:   Allergen Reactions    Sulfa (sulfonamide antibiotics) Hives      Social History     Tobacco Use    Smoking status: Former     Packs/day: 1.00     Years: 30.00     Pack years: 30.00     Types: Cigarettes    Smokeless tobacco: Current     Types: Chew   Substance Use Topics    Alcohol use: Yes    Drug use: Never      Review of Systems   Constitutional:  Negative for chills, activity change and night sweats.   HENT:  Negative for congestion and ear pain.    Eyes:  Negative for redness and itching.   Cardiovascular:  Negative for chest pain and palpitations.   Musculoskeletal:  Negative for arthralgias and back pain.   Skin:  Negative for rash.   Gastrointestinal:  Negative for abdominal pain and abdominal distention.   Neurological:  Negative for dizziness and headaches.   Hematological:  Negative for  adenopathy. Does not bruise/bleed easily.   Psychiatric/Behavioral:  Negative for confusion. The patient is not nervous/anxious.      Objective:      Physical Exam   Constitutional: He is oriented to person, place, and time. He appears well-developed and well-nourished.   HENT:   Head: Normocephalic.   Nose: Nose normal.   Mouth/Throat: Oropharynx is clear and moist.   Neck: No JVD present. No thyromegaly present.   Cardiovascular: Normal rate, regular rhythm, normal heart sounds and intact distal pulses.   Pulmonary/Chest: Normal expansion, hyperinflation, symmetric chest wall expansion, effort normal and breath sounds normal.   Abdominal: Soft. Bowel sounds are normal.   Musculoskeletal:         General: Normal range of motion.      Cervical back: Normal range of motion and neck supple.   Lymphadenopathy: No supraclavicular adenopathy is present.     He has no cervical adenopathy.   Neurological: He is alert and oriented to person, place, and time. He has normal reflexes.   Skin: Skin is warm and dry.   Psychiatric: He has a normal mood and affect. His behavior is normal.   Personal Diagnostic Review  none pertinent    No flowsheet data found.      Assessment:       1. Chronic obstructive pulmonary disease, unspecified COPD type          Outpatient Encounter Medications as of 11/14/2022   Medication Sig Dispense Refill    amlodipine besylate (AMLODIPINE ORAL) Take 5 mg by mouth Daily.      aspirin (ECOTRIN) 81 MG EC tablet Take 81 mg by mouth once daily.      atorvastatin (LIPITOR) 80 MG tablet Take 80 mg by mouth.      ciclopirox (PENLAC) 8 % Soln Apply topically nightly. 6.6 mL 11    multivit-min/ferrous fumarate (MULTI VITAMIN ORAL) Take by mouth.      omeprazole (PRILOSEC) 20 MG capsule Take 20 mg by mouth.      predniSONE (DELTASONE) 20 MG tablet Take 2 tablets (40 mg total) by mouth once daily for 7 days, THEN 1 tablet (20 mg total) once daily for 7 days, THEN 0.5 tablets (10 mg total) once daily for 7 days.  25 tablet 0    umeclidinium-vilanteroL (ANORO ELLIPTA) 62.5-25 mcg/actuation DsDv Inhale into the lungs. Controller      HYDROcodone-acetaminophen 7.5-300 mg Tab Take by mouth.      levoFLOXacin (LEVAQUIN) 250 MG tablet 2 tabs po now then 1 tab po q day x 9 days (Patient not taking: Reported on 11/14/2022) 11 tablet 0    mirabegron (MYRBETRIQ) 25 mg Tb24 ER tablet Take 1 tablet (25 mg total) by mouth once daily. 30 tablet 11    triamcinolone acetonide 0.1% (KENALOG) 0.1 % ointment Apply topically 2 (two) times daily. 454 g 5     No facility-administered encounter medications on file as of 11/14/2022.     No orders of the defined types were placed in this encounter.      Plan:       Problem List Items Addressed This Visit          Pulmonary    COPD (chronic obstructive pulmonary disease) (Chronic)     Seen him in the past for this he seems to be stable at the current time no new issues.  My plan would be to continue his Stiolto and rescue inhaler Ventolin no changes in regimen see him back in 6 months increase activity stay up-to-date on vaccinations

## 2022-12-20 ENCOUNTER — OFFICE VISIT (OUTPATIENT)
Dept: DERMATOLOGY | Facility: CLINIC | Age: 82
End: 2022-12-20
Payer: MEDICARE

## 2022-12-20 DIAGNOSIS — L20.9 ATOPIC DERMATITIS, UNSPECIFIED TYPE: ICD-10-CM

## 2022-12-20 DIAGNOSIS — D48.9 NEOPLASM OF UNCERTAIN BEHAVIOR: Primary | ICD-10-CM

## 2022-12-20 PROCEDURE — 1160F RVW MEDS BY RX/DR IN RCRD: CPT | Mod: CPTII,,, | Performed by: STUDENT IN AN ORGANIZED HEALTH CARE EDUCATION/TRAINING PROGRAM

## 2022-12-20 PROCEDURE — 1159F MED LIST DOCD IN RCRD: CPT | Mod: CPTII,,, | Performed by: STUDENT IN AN ORGANIZED HEALTH CARE EDUCATION/TRAINING PROGRAM

## 2022-12-20 PROCEDURE — 99214 OFFICE O/P EST MOD 30 MIN: CPT | Mod: 25,,, | Performed by: STUDENT IN AN ORGANIZED HEALTH CARE EDUCATION/TRAINING PROGRAM

## 2022-12-20 PROCEDURE — 11302 SHAVE SKIN LESION 1.1-2.0 CM: CPT | Mod: ,,, | Performed by: STUDENT IN AN ORGANIZED HEALTH CARE EDUCATION/TRAINING PROGRAM

## 2022-12-20 PROCEDURE — 99214 PR OFFICE/OUTPT VISIT, EST, LEVL IV, 30-39 MIN: ICD-10-PCS | Mod: 25,,, | Performed by: STUDENT IN AN ORGANIZED HEALTH CARE EDUCATION/TRAINING PROGRAM

## 2022-12-20 PROCEDURE — 11302 PR SHAV SKIN LES 1.1-2.0 CM TRUNK,ARM,LEG: ICD-10-PCS | Mod: ,,, | Performed by: STUDENT IN AN ORGANIZED HEALTH CARE EDUCATION/TRAINING PROGRAM

## 2022-12-20 PROCEDURE — 1159F PR MEDICATION LIST DOCUMENTED IN MEDICAL RECORD: ICD-10-PCS | Mod: CPTII,,, | Performed by: STUDENT IN AN ORGANIZED HEALTH CARE EDUCATION/TRAINING PROGRAM

## 2022-12-20 PROCEDURE — 1160F PR REVIEW ALL MEDS BY PRESCRIBER/CLIN PHARMACIST DOCUMENTED: ICD-10-PCS | Mod: CPTII,,, | Performed by: STUDENT IN AN ORGANIZED HEALTH CARE EDUCATION/TRAINING PROGRAM

## 2022-12-20 RX ORDER — TACROLIMUS 1 MG/G
OINTMENT TOPICAL 2 TIMES DAILY
Qty: 100 G | Refills: 5 | Status: SHIPPED | OUTPATIENT
Start: 2022-12-20 | End: 2023-05-30

## 2022-12-20 NOTE — PROGRESS NOTES
[facility]  Qamar Rivera MD    [address] [place] [address]  [phone]    Fax: [phone]    Patient Name: Nathan Hinton  Medical Record Number: [id number]  PCP: Jayant Castro MD  Age: 82 y.o. : 1940  Contact: There are no phone numbers on file.    History of Present Illness:     Nathan Hinton is a 82 y.o.  male here for follow up of atopic dermatitis. Patient last seen 22. Current therapies include TAC 0.1% ointment and prednisone taper dose. The atopic dermatitis continues to flare.     The patient has no other concerns today.    Review of Systems:     Unremarkable other than mentioned above.     Physical Exam:     General: Relaxed, oriented, alert    Skin examination of the scalp, face, neck, chest, back, abdomen, upper extremities and lower extremities were normal except for as listed below        Assessment and Plan:     1. Atopic Dermatitis   - eczematous plaques and papules located on the back and arms with lichenification    Status: severe     Plan:   Topical Steroid: Protopic ointment bid   - discussed dupixent if not improved     Counseling.    Skin care: Patient should bathe using lukewarm water with a mild cleanser and moisturize immediately after. Emollients should be applied at least 2-3 times daily. Avoid scented detergents or fabric softeners. Keep fingernails short. Avoid excessive hand washing.  Expectations: The patient is aware that eczema is chronic in nature and can improve with moisturizers and topical steroids and worsen with stress, scented soaps, detergents, scratching, dry skin, changes in weather and skin infections.    Contact office if: Eczema worsens or fails to improve despite several weeks of treatment; patient develops skin infections (such as: yellow honey colored crusts or cold sores).      2. Neoplasm of Uncertain Behavior   - irregular black macule located on the right upper back (1.2 cm)   Ddx includes: r/o  melanoma    Shave removal    Pre-procedure Diagnosis: as above  Post_procedure Diagnosis: same  Estimated Blood Loss: <1cc    Findings: None  Complications: None  Specimens: to pathology      Written informed consent was obtained after discussing risks including pain, bleeding, infection, recurrence and scarring. The biopsy site was sterilely prepped with alcohol, which was allowed to dry completely, then locally infiltrated with 1% lidocaine with epinephrine, ~3 cc total. A shave removal was obtained using a Dermablade/15 and the specimen was sent to dermatopathology. Aluminum chloride was used for hemostasis. Antibiotic ointment and a clean dressing were applied. The patient tolerated the procedure well without complications. Verbal and written wound care instructions were given.        Return to clinic in 8 weeks     AVS printed with patient instructions     Qamar Rivera MD   Mohs Surgery/Dermatologic Oncology  Dermatology

## 2023-01-03 ENCOUNTER — HOSPITAL ENCOUNTER (EMERGENCY)
Facility: HOSPITAL | Age: 83
Discharge: HOME | End: 2023-01-03
Attending: EMERGENCY MEDICINE
Payer: MEDICARE

## 2023-01-03 VITALS
RESPIRATION RATE: 18 BRPM | TEMPERATURE: 98 F | HEART RATE: 105 BPM | OXYGEN SATURATION: 96 % | SYSTOLIC BLOOD PRESSURE: 139 MMHG | WEIGHT: 230 LBS | HEIGHT: 75 IN | BODY MASS INDEX: 28.6 KG/M2 | DIASTOLIC BLOOD PRESSURE: 80 MMHG

## 2023-01-03 DIAGNOSIS — J44.1 COPD WITH ACUTE EXACERBATION: Primary | ICD-10-CM

## 2023-01-03 DIAGNOSIS — R06.02 SOB (SHORTNESS OF BREATH): ICD-10-CM

## 2023-01-03 PROCEDURE — 93010 EKG 12-LEAD: ICD-10-PCS | Mod: ,,, | Performed by: INTERNAL MEDICINE

## 2023-01-03 PROCEDURE — 25000242 PHARM REV CODE 250 ALT 637 W/ HCPCS: Performed by: EMERGENCY MEDICINE

## 2023-01-03 PROCEDURE — 96374 THER/PROPH/DIAG INJ IV PUSH: CPT

## 2023-01-03 PROCEDURE — 63600175 PHARM REV CODE 636 W HCPCS: Performed by: EMERGENCY MEDICINE

## 2023-01-03 PROCEDURE — 93010 ELECTROCARDIOGRAM REPORT: CPT | Mod: ,,, | Performed by: INTERNAL MEDICINE

## 2023-01-03 PROCEDURE — 99284 EMERGENCY DEPT VISIT MOD MDM: CPT | Mod: ,,, | Performed by: EMERGENCY MEDICINE

## 2023-01-03 PROCEDURE — 99284 PR EMERGENCY DEPT VISIT,LEVEL IV: ICD-10-PCS | Mod: ,,, | Performed by: EMERGENCY MEDICINE

## 2023-01-03 PROCEDURE — 99284 EMERGENCY DEPT VISIT MOD MDM: CPT | Mod: 25

## 2023-01-03 PROCEDURE — 93005 ELECTROCARDIOGRAM TRACING: CPT

## 2023-01-03 RX ORDER — METHYLPREDNISOLONE SOD SUCC 125 MG
125 VIAL (EA) INJECTION
Status: COMPLETED | OUTPATIENT
Start: 2023-01-03 | End: 2023-01-03

## 2023-01-03 RX ORDER — AZITHROMYCIN 250 MG/1
250 TABLET, FILM COATED ORAL DAILY
Qty: 6 TABLET | Refills: 0 | Status: SHIPPED | OUTPATIENT
Start: 2023-01-03 | End: 2023-01-23

## 2023-01-03 RX ORDER — IPRATROPIUM BROMIDE AND ALBUTEROL SULFATE 2.5; .5 MG/3ML; MG/3ML
9 SOLUTION RESPIRATORY (INHALATION)
Status: COMPLETED | OUTPATIENT
Start: 2023-01-03 | End: 2023-01-03

## 2023-01-03 RX ORDER — ALBUTEROL SULFATE 2.5 MG/.5ML
2.5 SOLUTION RESPIRATORY (INHALATION) EVERY 4 HOURS PRN
Qty: 60 EACH | Refills: 11 | Status: SHIPPED | OUTPATIENT
Start: 2023-01-03 | End: 2024-01-03

## 2023-01-03 RX ORDER — PREDNISONE 50 MG/1
50 TABLET ORAL DAILY
Qty: 6 TABLET | Refills: 0 | Status: SHIPPED | OUTPATIENT
Start: 2023-01-03 | End: 2023-01-23

## 2023-01-03 RX ADMIN — IPRATROPIUM BROMIDE AND ALBUTEROL SULFATE 9 ML: .5; 3 SOLUTION RESPIRATORY (INHALATION) at 08:01

## 2023-01-03 RX ADMIN — METHYLPREDNISOLONE SODIUM SUCCINATE 125 MG: 125 INJECTION, POWDER, FOR SOLUTION INTRAMUSCULAR; INTRAVENOUS at 08:01

## 2023-01-04 NOTE — DISCHARGE INSTRUCTIONS
Take medications as prescribed.  Use home inhaler or nebulization treatments every 4 hours as needed for wheezing.  Return to emergency department for any worsening or further problems.  Follow up in clinic with primary care provider in 2-3 days if symptoms persist.

## 2023-01-04 NOTE — ED PROVIDER NOTES
"Encounter Date: 1/3/2023    SCRIBE #1 NOTE: I, Hanna Samuel, am scribing for, and in the presence of,  Yovanny Rubio MD. I have scribed the entire note.     History     Chief Complaint   Patient presents with    Shortness of Breath     The patient is a 82 y.o. male that presents to the emergency department due to shortness of breath. The patient explains that for the past 3-4 days he has been experiencing some wheezing and shortness of breath and it has gradually gotten worse. He states he used his inhaler twice today but did not use his breathing treatment because it makes " him jittery." He denies a fever, cough, nausea, vomiting, diarrhea, and chest pain. He has no medical hx of heart problems. He does have a medical hx of COPD and he has had a blood clot. No other symptoms or medical hx were reported.     The history is provided by the patient. No  was used.   Review of patient's allergies indicates:   Allergen Reactions    Sulfa (sulfonamide antibiotics) Hives     Past Medical History:   Diagnosis Date    #[id number]     COPD (chronic obstructive pulmonary disease)     Hypertension     Sarcoma      Past Surgical History:   Procedure Laterality Date    ABDOMINAL AORTIC ANEURYSM REPAIR      BACK SURGERY      FLEXOR TENDON REPAIR Right 11/4/2021    Procedure: REPAIR, TENDON, FLEXOR;  Surgeon: Dani Degroot MD;  Location: [facility];  Service: Orthopedics;  Laterality: Right;    SURGICAL REMOVAL OF SARCOMA      TRIGGER FINGER RELEASE Right 11/4/2021    Procedure: RELEASE, TRIGGER FINGER;  Surgeon: Dani Degroot MD;  Location: [facility];  Service: Orthopedics;  Laterality: Right;    VASCULAR SURGERY       History reviewed. No pertinent family history.  Social History     Tobacco Use    Smoking status: Former     Packs/day: 1.00     Years: 30.00     Pack years: 30.00     Types: Cigarettes    Smokeless tobacco: Current     Types: Chew   Substance Use Topics    Alcohol use: " Yes    Drug use: Never     Review of Systems   Constitutional:  Negative for fever.   HENT: Negative.     Eyes: Negative.    Respiratory:  Positive for shortness of breath and wheezing. Negative for cough.    Cardiovascular:  Negative for chest pain.   Gastrointestinal:  Negative for diarrhea, nausea and vomiting.   Endocrine: Negative.    Genitourinary: Negative.    Musculoskeletal: Negative.    Skin: Negative.    Allergic/Immunologic: Negative.    Neurological: Negative.    Hematological: Negative.    Psychiatric/Behavioral: Negative.     All other systems reviewed and are negative.    Physical Exam     Initial Vitals [01/03/23 1942]   BP Pulse Resp Temp SpO2   139/80 95 (!) 26 98.1 °F (36.7 °C) (!) 92 %      MAP       --         Physical Exam    Constitutional: He appears well-developed and well-nourished.   Eyes: Conjunctivae and EOM are normal. Pupils are equal, round, and reactive to light.   Neck: Neck supple.   Normal range of motion.  Cardiovascular:  Normal heart sounds.           Pulmonary/Chest: He has wheezes.   Abdominal: Abdomen is soft. Bowel sounds are normal.   Musculoskeletal:         General: Normal range of motion.      Cervical back: Normal range of motion and neck supple.     Neurological: He is alert and oriented to person, place, and time. He has normal strength and normal reflexes.   Skin: Skin is warm and dry.   Psychiatric: He has a normal mood and affect. His behavior is normal. Judgment and thought content normal.       ED Course   Procedures  Labs Reviewed - No data to display  EKG Readings: (Independently Interpreted)   Rhythm: Normal Sinus Rhythm.     Imaging Results              X-Ray Chest AP Portable (Final result)  Result time 01/03/23 19:56:58      Final result by Dani Vazquez MD (01/03/23 19:56:58)                   Impression:      No acute findings.      Electronically signed by: Dani Vazquez  Date:    01/03/2023  Time:    19:56               Narrative:    EXAMINATION:  XR  CHEST AP PORTABLE    CLINICAL HISTORY:  Dyspnea;    TECHNIQUE:  Single frontal view of the chest was performed.    COMPARISON:  08/24/2022    FINDINGS:  The cardiomegaly is similar.  The lungs appear clear.  No pneumothorax or large pleural effusion                                       Medications   methylPREDNISolone sodium succinate injection 125 mg (125 mg Intravenous Given 1/3/23 2003)   albuterol-ipratropium 2.5 mg-0.5 mg/3 mL nebulizer solution 9 mL (9 mLs Nebulization Given 1/3/23 2003)                Attending Attestation:           Physician Attestation for Scribe:  Physician Attestation Statement for Scribe #1: I, Yovanny Rubio MD, reviewed documentation, as scribed by Hanna Samuel in my presence, and it is both accurate and complete.           ED Course as of 01/03/23 2140 Tue Jan 03, 2023 2137 Wheezing is improved, patient states he feels much better.  Ready for discharge home.  Diagnosis is COPD exacerbation. [BB]      ED Course User Index  [BB] Yovanny Rubio MD                 Clinical Impression:   Final diagnoses:  [R06.02] SOB (shortness of breath)  [J44.1] COPD with acute exacerbation (Primary)        ED Disposition Condition    Discharge Stable          ED Prescriptions       Medication Sig Dispense Start Date End Date Auth. Provider    albuterol sulfate 2.5 mg/0.5 mL Nebu Take 2.5 mg by nebulization every 4 (four) hours as needed (P.r.n. wheezing). Rescue 60 each 1/3/2023 1/3/2024 Yovanny Rubio MD    predniSONE (DELTASONE) 50 MG Tab Take 1 tablet (50 mg total) by mouth once daily. 6 tablet 1/3/2023 -- Yovanny Rubio MD    azithromycin (Z-LENA) 250 MG tablet Take 1 tablet (250 mg total) by mouth once daily. Take first 2 tablets together, then 1 every day until finished. 6 tablet 1/3/2023 -- Yovanny Rubio MD          Follow-up Information    None          Yovanny Rubio MD  01/03/23 2140

## 2023-01-05 ENCOUNTER — TELEPHONE (OUTPATIENT)
Dept: EMERGENCY MEDICINE | Facility: HOSPITAL | Age: 83
End: 2023-01-05
Payer: MEDICARE

## 2023-01-23 ENCOUNTER — OFFICE VISIT (OUTPATIENT)
Dept: FAMILY MEDICINE | Facility: CLINIC | Age: 83
End: 2023-01-23
Payer: MEDICARE

## 2023-01-23 VITALS
HEIGHT: 75 IN | BODY MASS INDEX: 27.68 KG/M2 | DIASTOLIC BLOOD PRESSURE: 60 MMHG | OXYGEN SATURATION: 84 % | TEMPERATURE: 98 F | RESPIRATION RATE: 20 BRPM | HEART RATE: 84 BPM | WEIGHT: 222.63 LBS | SYSTOLIC BLOOD PRESSURE: 110 MMHG

## 2023-01-23 DIAGNOSIS — J44.9 CHRONIC OBSTRUCTIVE PULMONARY DISEASE, UNSPECIFIED COPD TYPE: Primary | ICD-10-CM

## 2023-01-23 PROCEDURE — 1126F AMNT PAIN NOTED NONE PRSNT: CPT | Mod: ,,, | Performed by: FAMILY MEDICINE

## 2023-01-23 PROCEDURE — 3078F PR MOST RECENT DIASTOLIC BLOOD PRESSURE < 80 MM HG: ICD-10-PCS | Mod: ,,, | Performed by: FAMILY MEDICINE

## 2023-01-23 PROCEDURE — 1160F PR REVIEW ALL MEDS BY PRESCRIBER/CLIN PHARMACIST DOCUMENTED: ICD-10-PCS | Mod: ,,, | Performed by: FAMILY MEDICINE

## 2023-01-23 PROCEDURE — 3078F DIAST BP <80 MM HG: CPT | Mod: ,,, | Performed by: FAMILY MEDICINE

## 2023-01-23 PROCEDURE — 3288F PR FALLS RISK ASSESSMENT DOCUMENTED: ICD-10-PCS | Mod: ,,, | Performed by: FAMILY MEDICINE

## 2023-01-23 PROCEDURE — 1159F MED LIST DOCD IN RCRD: CPT | Mod: ,,, | Performed by: FAMILY MEDICINE

## 2023-01-23 PROCEDURE — 1101F PT FALLS ASSESS-DOCD LE1/YR: CPT | Mod: ,,, | Performed by: FAMILY MEDICINE

## 2023-01-23 PROCEDURE — 1160F RVW MEDS BY RX/DR IN RCRD: CPT | Mod: ,,, | Performed by: FAMILY MEDICINE

## 2023-01-23 PROCEDURE — 1101F PR PT FALLS ASSESS DOC 0-1 FALLS W/OUT INJ PAST YR: ICD-10-PCS | Mod: ,,, | Performed by: FAMILY MEDICINE

## 2023-01-23 PROCEDURE — 3074F PR MOST RECENT SYSTOLIC BLOOD PRESSURE < 130 MM HG: ICD-10-PCS | Mod: ,,, | Performed by: FAMILY MEDICINE

## 2023-01-23 PROCEDURE — 3288F FALL RISK ASSESSMENT DOCD: CPT | Mod: ,,, | Performed by: FAMILY MEDICINE

## 2023-01-23 PROCEDURE — 99213 PR OFFICE/OUTPT VISIT, EST, LEVL III, 20-29 MIN: ICD-10-PCS | Mod: ,,, | Performed by: FAMILY MEDICINE

## 2023-01-23 PROCEDURE — 1159F PR MEDICATION LIST DOCUMENTED IN MEDICAL RECORD: ICD-10-PCS | Mod: ,,, | Performed by: FAMILY MEDICINE

## 2023-01-23 PROCEDURE — 3074F SYST BP LT 130 MM HG: CPT | Mod: ,,, | Performed by: FAMILY MEDICINE

## 2023-01-23 PROCEDURE — 99213 OFFICE O/P EST LOW 20 MIN: CPT | Mod: ,,, | Performed by: FAMILY MEDICINE

## 2023-01-23 PROCEDURE — 1126F PR PAIN SEVERITY QUANTIFIED, NO PAIN PRESENT: ICD-10-PCS | Mod: ,,, | Performed by: FAMILY MEDICINE

## 2023-01-23 RX ORDER — BUDESONIDE, GLYCOPYRROLATE, AND FORMOTEROL FUMARATE 160; 9; 4.8 UG/1; UG/1; UG/1
2 AEROSOL, METERED RESPIRATORY (INHALATION) 2 TIMES DAILY
Qty: 10.7 G | Refills: 11 | OUTPATIENT
Start: 2023-01-23 | End: 2023-01-23

## 2023-01-23 RX ORDER — BUDESONIDE, GLYCOPYRROLATE, AND FORMOTEROL FUMARATE 160; 9; 4.8 UG/1; UG/1; UG/1
2 AEROSOL, METERED RESPIRATORY (INHALATION) 2 TIMES DAILY
Qty: 10.7 G | Refills: 11 | Status: SHIPPED | OUTPATIENT
Start: 2023-01-23

## 2023-01-23 RX ORDER — AZITHROMYCIN 250 MG/1
TABLET, FILM COATED ORAL
Qty: 6 TABLET | Refills: 0 | Status: ON HOLD | OUTPATIENT
Start: 2023-01-23 | End: 2023-04-12 | Stop reason: HOSPADM

## 2023-01-23 RX ORDER — PREDNISONE 20 MG/1
20 TABLET ORAL 2 TIMES DAILY
Qty: 10 TABLET | Refills: 0 | Status: ON HOLD | OUTPATIENT
Start: 2023-01-23 | End: 2023-04-12 | Stop reason: HOSPADM

## 2023-01-23 NOTE — PROGRESS NOTES
Jayant Castro MD        PATIENT NAME: Nathan Hinton  : 1940  DATE: 23  MRN: [id number]      Billing Provider: Jayant Castro MD  Level of Service: [place] OFFICE/OUTPT VISIT, EST, LEVL III, 20-29 MIN  Patient PCP Information       Provider PCP Type    Jayant Castro MD General            Reason for Visit / Chief Complaint: URI       Update PCP  Update Chief Complaint         History of Present Illness / Problem Focused Workflow     Nathan Hinton presents to the clinic with URI     COPD exacerbation with O2 sat 80's    URI   Associated symptoms include coughing. Pertinent negatives include no abdominal pain, chest pain, congestion, diarrhea, headaches, nausea, neck pain, rash, rhinorrhea, sinus pain, sore throat or wheezing.   Review of Systems     Review of Systems   Constitutional:  Negative for activity change, appetite change, fever and unexpected weight change.   HENT:  Negative for congestion, rhinorrhea, sinus pressure, sinus pain, sore throat and trouble swallowing.    Eyes:  Negative for photophobia, pain, discharge and visual disturbance.   Respiratory:  Positive for cough and shortness of breath. Negative for chest tightness, wheezing and stridor.    Cardiovascular:  Negative for chest pain, palpitations and leg swelling.   Gastrointestinal:  Negative for abdominal pain, blood in stool, constipation, diarrhea and nausea.   Endocrine: Negative for polydipsia, polyphagia and polyuria.   Genitourinary:  Negative for difficulty urinating, flank pain and hematuria.   Musculoskeletal:  Negative for arthralgias and neck pain.   Skin:  Negative for rash.   Allergic/Immunologic: Negative for food allergies.   Neurological:  Negative for dizziness, tremors, seizures, syncope, weakness (global weakness) and headaches.   Psychiatric/Behavioral:  Negative for behavioral problems, confusion, decreased concentration, dysphoric mood and hallucinations. The patient is not nervous/anxious.       Medical / Social  / Family History     Past Medical History:   Diagnosis Date    #[id number]     COPD (chronic obstructive pulmonary disease)     Hypertension     Sarcoma        Past Surgical History:   Procedure Laterality Date    ABDOMINAL AORTIC ANEURYSM REPAIR      BACK SURGERY      FLEXOR TENDON REPAIR Right 11/4/2021    Procedure: REPAIR, TENDON, FLEXOR;  Surgeon: Dani Degroot MD;  Location: [facility];  Service: Orthopedics;  Laterality: Right;    SURGICAL REMOVAL OF SARCOMA      TRIGGER FINGER RELEASE Right 11/4/2021    Procedure: RELEASE, TRIGGER FINGER;  Surgeon: Dani Degroot MD;  Location: [facility];  Service: Orthopedics;  Laterality: Right;    VASCULAR SURGERY         Social History    reports that he has quit smoking. His smoking use included cigarettes. He has a 30.00 pack-year smoking history. His smokeless tobacco use includes chew. He reports current alcohol use. He reports that he does not use drugs.    Family History  's family history is not on file.    Medications and Allergies     Medications  No outpatient medications have been marked as taking for the 1/23/23 encounter (Office Visit) with Jayant Castro MD.       Allergies  Review of patient's allergies indicates:   Allergen Reactions    Sulfa (sulfonamide antibiotics) Hives       Physical Examination     Vitals:    01/23/23 0859   BP: 110/60   Pulse: 84   Resp: 20   Temp: 97.7 °F (36.5 °C)     Physical Exam  Constitutional:       General: He is not in acute distress.     Appearance: Normal appearance.   HENT:      Head: Normocephalic.      Right Ear: Tympanic membrane and ear canal normal.      Left Ear: Tympanic membrane and ear canal normal.      Nose: Nose normal.      Mouth/Throat:      Mouth: Mucous membranes are moist.      Pharynx: No oropharyngeal exudate.   Eyes:      Extraocular Movements: Extraocular movements intact.      Pupils: Pupils are equal, round, and reactive to light.   Cardiovascular:      Rate and  Rhythm: Normal rate and regular rhythm.      Heart sounds: No murmur heard.  Pulmonary:      Effort: Pulmonary effort is normal.      Breath sounds: Normal breath sounds. No wheezing.      Comments: Decreased air movement in general  Abdominal:      General: Abdomen is flat. Bowel sounds are normal.      Palpations: Abdomen is soft.      Hernia: No hernia is present.   Musculoskeletal:         General: Normal range of motion.      Cervical back: Normal range of motion and neck supple.      Right lower leg: No edema.      Left lower leg: No edema.   Lymphadenopathy:      Cervical: No cervical adenopathy.   Skin:     General: Skin is warm and dry.      Coloration: Skin is not jaundiced.      Findings: No lesion.   Neurological:      General: No focal deficit present.      Mental Status: He is alert and oriented to person, place, and time.      Cranial Nerves: No cranial nerve deficit.      Gait: Gait normal.   Psychiatric:         Mood and Affect: Mood normal.         Behavior: Behavior normal.         Judgment: Judgment normal.        Assessment and Plan (including Health Maintenance)      Problem List  Smart [place]  Document Outside HM   :    Plan:           Health Maintenance Due   Topic Date Due    Pneumococcal Vaccines (Age 65+) (1 - PCV) Never done    TETANUS VACCINE  Never done    Shingles Vaccine (1 of 2) Never done    COVID-19 Vaccine (3 - Booster for Dejan series) 01/27/2022    Influenza Vaccine (1) 09/01/2022       Problem List Items Addressed This Visit          Pulmonary    COPD (chronic obstructive pulmonary disease) - Primary (Chronic)    Relevant Medications    budesonide-glycopyr-formoterol (BREZTRI AEROSPHERE) 160-9-4.8 mcg/actuation HFAA       Health Maintenance Topics with due status: Not Due       Topic Last Completion Date    Lipid Panel 08/03/2020       Future Appointments   Date Time Provider Department Center   2/14/2023  2:00 PM Hoda Rivera MD [facility] [place] [place]   5/15/2023  2:10 PM  Randall Alicea MD [facility]        There are no Patient Instructions on file for this visit.  Follow up if symptoms worsen or fail to improve.     Signature:  Jayant Castro MD      Date of encounter: 1/23/23

## 2023-01-26 RX ORDER — BUDESONIDE, GLYCOPYRROLATE, AND FORMOTEROL FUMARATE 160; 9; 4.8 UG/1; UG/1; UG/1
2 AEROSOL, METERED RESPIRATORY (INHALATION) 2 TIMES DAILY
Qty: 10.7 G | Refills: 11 | Status: SHIPPED | OUTPATIENT
Start: 2023-01-26 | End: 2023-05-30 | Stop reason: SDUPTHER

## 2023-02-14 ENCOUNTER — OFFICE VISIT (OUTPATIENT)
Dept: DERMATOLOGY | Facility: CLINIC | Age: 83
End: 2023-02-14
Payer: MEDICARE

## 2023-02-14 DIAGNOSIS — L30.9 ECZEMA, UNSPECIFIED TYPE: ICD-10-CM

## 2023-02-14 DIAGNOSIS — L20.9 ATOPIC DERMATITIS IN ADULT: Primary | ICD-10-CM

## 2023-02-14 PROCEDURE — 99213 OFFICE O/P EST LOW 20 MIN: CPT | Mod: ,,, | Performed by: STUDENT IN AN ORGANIZED HEALTH CARE EDUCATION/TRAINING PROGRAM

## 2023-02-14 PROCEDURE — 99213 PR OFFICE/OUTPT VISIT, EST, LEVL III, 20-29 MIN: ICD-10-PCS | Mod: ,,, | Performed by: STUDENT IN AN ORGANIZED HEALTH CARE EDUCATION/TRAINING PROGRAM

## 2023-02-14 RX ORDER — TRIAMCINOLONE ACETONIDE 1 MG/G
OINTMENT TOPICAL 2 TIMES DAILY
Qty: 454 G | Refills: 11 | Status: SHIPPED | OUTPATIENT
Start: 2023-02-14 | End: 2023-02-16

## 2023-02-14 NOTE — PROGRESS NOTES
[facility]  Qamar Rivera MD    [address] [place] [address]  [phone]    Fax: [phone]    Patient Name: Nathan Hinton  Medical Record Number: [id number]  PCP: Jayant Castro MD  Age: 82 y.o. : 1940  Contact: There are no phone numbers on file.    History of Present Illness:     Nathan Hinton is a 82 y.o.  male here for follow up of atopic dermatitis. Last seen 22. Current therapies include protopic ointment. Patient states his atopic dermatitis has not improved.     The patient has no other concerns today.    Review of Systems:     Unremarkable other than mentioned above.     Physical Exam:     General: Relaxed, oriented, alert    Skin examination of the scalp, face, neck, chest, back, abdomen, upper extremities and lower extremities were normal except for as listed below      Assessment and Plan:     1. Atopic Dermatitis   - eczematous plaques and papules located on the lower back and legs with lichenification    Status: inadequately controlled     Plan:   - Patient declines dupixent   - Triamcinolone 0.1% ointment        Counseling.    Skin care: Patient should bathe using lukewarm water with a mild cleanser and moisturize immediately after. Emollients should be applied at least 2-3 times daily. Avoid scented detergents or fabric softeners. Keep fingernails short. Avoid excessive hand washing.  Expectations: The patient is aware that eczema is chronic in nature and can improve with moisturizers and topical steroids and worsen with stress, scented soaps, detergents, scratching, dry skin, changes in weather and skin infections.    Contact office if: Eczema worsens or fails to improve despite several weeks of treatment; patient develops skin infections (such as: yellow honey colored crusts or cold sores).      Return to clinic in 6 months FU     AVS printed with patient instructions     Qamar Rivera MD   Mohs Surgery/Dermatologic Oncology  Dermatology

## 2023-02-16 DIAGNOSIS — L20.9 ATOPIC DERMATITIS IN ADULT: Primary | ICD-10-CM

## 2023-02-16 RX ORDER — TRIAMCINOLONE ACETONIDE 1 MG/G
CREAM TOPICAL 2 TIMES DAILY
Qty: 484 G | Refills: 5 | Status: ON HOLD | OUTPATIENT
Start: 2023-02-16 | End: 2023-04-12 | Stop reason: HOSPADM

## 2023-02-16 RX ORDER — TRIAMCINOLONE ACETONIDE 1 MG/G
CREAM TOPICAL 2 TIMES DAILY
Qty: 454 G | Refills: 11 | Status: SHIPPED | OUTPATIENT
Start: 2023-02-16 | End: 2023-05-30

## 2023-02-27 ENCOUNTER — OFFICE VISIT (OUTPATIENT)
Dept: FAMILY MEDICINE | Facility: CLINIC | Age: 83
End: 2023-02-27
Payer: MEDICARE

## 2023-02-27 VITALS
BODY MASS INDEX: 27.73 KG/M2 | SYSTOLIC BLOOD PRESSURE: 118 MMHG | HEART RATE: 85 BPM | OXYGEN SATURATION: 90 % | RESPIRATION RATE: 20 BRPM | HEIGHT: 75 IN | WEIGHT: 223 LBS | DIASTOLIC BLOOD PRESSURE: 60 MMHG

## 2023-02-27 DIAGNOSIS — M54.50 CHRONIC BILATERAL LOW BACK PAIN WITHOUT SCIATICA: Primary | ICD-10-CM

## 2023-02-27 DIAGNOSIS — J44.9 CHRONIC OBSTRUCTIVE PULMONARY DISEASE, UNSPECIFIED COPD TYPE: Chronic | ICD-10-CM

## 2023-02-27 DIAGNOSIS — G89.29 CHRONIC BILATERAL LOW BACK PAIN WITHOUT SCIATICA: Primary | ICD-10-CM

## 2023-02-27 PROCEDURE — 1101F PR PT FALLS ASSESS DOC 0-1 FALLS W/OUT INJ PAST YR: ICD-10-PCS | Mod: ,,, | Performed by: FAMILY MEDICINE

## 2023-02-27 PROCEDURE — 3078F PR MOST RECENT DIASTOLIC BLOOD PRESSURE < 80 MM HG: ICD-10-PCS | Mod: ,,, | Performed by: FAMILY MEDICINE

## 2023-02-27 PROCEDURE — 3074F PR MOST RECENT SYSTOLIC BLOOD PRESSURE < 130 MM HG: ICD-10-PCS | Mod: ,,, | Performed by: FAMILY MEDICINE

## 2023-02-27 PROCEDURE — 1159F PR MEDICATION LIST DOCUMENTED IN MEDICAL RECORD: ICD-10-PCS | Mod: ,,, | Performed by: FAMILY MEDICINE

## 2023-02-27 PROCEDURE — 1160F PR REVIEW ALL MEDS BY PRESCRIBER/CLIN PHARMACIST DOCUMENTED: ICD-10-PCS | Mod: ,,, | Performed by: FAMILY MEDICINE

## 2023-02-27 PROCEDURE — 99214 OFFICE O/P EST MOD 30 MIN: CPT | Mod: ,,, | Performed by: FAMILY MEDICINE

## 2023-02-27 PROCEDURE — 1126F PR PAIN SEVERITY QUANTIFIED, NO PAIN PRESENT: ICD-10-PCS | Mod: ,,, | Performed by: FAMILY MEDICINE

## 2023-02-27 PROCEDURE — 1126F AMNT PAIN NOTED NONE PRSNT: CPT | Mod: ,,, | Performed by: FAMILY MEDICINE

## 2023-02-27 PROCEDURE — 1159F MED LIST DOCD IN RCRD: CPT | Mod: ,,, | Performed by: FAMILY MEDICINE

## 2023-02-27 PROCEDURE — 3078F DIAST BP <80 MM HG: CPT | Mod: ,,, | Performed by: FAMILY MEDICINE

## 2023-02-27 PROCEDURE — 3074F SYST BP LT 130 MM HG: CPT | Mod: ,,, | Performed by: FAMILY MEDICINE

## 2023-02-27 PROCEDURE — 3288F PR FALLS RISK ASSESSMENT DOCUMENTED: ICD-10-PCS | Mod: ,,, | Performed by: FAMILY MEDICINE

## 2023-02-27 PROCEDURE — 1101F PT FALLS ASSESS-DOCD LE1/YR: CPT | Mod: ,,, | Performed by: FAMILY MEDICINE

## 2023-02-27 PROCEDURE — 1160F RVW MEDS BY RX/DR IN RCRD: CPT | Mod: ,,, | Performed by: FAMILY MEDICINE

## 2023-02-27 PROCEDURE — 3288F FALL RISK ASSESSMENT DOCD: CPT | Mod: ,,, | Performed by: FAMILY MEDICINE

## 2023-02-27 PROCEDURE — 99214 PR OFFICE/OUTPT VISIT, EST, LEVL IV, 30-39 MIN: ICD-10-PCS | Mod: ,,, | Performed by: FAMILY MEDICINE

## 2023-02-27 RX ORDER — HYDROCODONE BITARTRATE AND ACETAMINOPHEN 7.5; 3 MG/1; MG/1
1 TABLET ORAL 3 TIMES DAILY PRN
Qty: 60 TABLET | Refills: 0 | Status: SHIPPED | OUTPATIENT
Start: 2023-02-27 | End: 2023-02-28 | Stop reason: SDUPTHER

## 2023-02-27 RX ORDER — PREDNISONE 20 MG/1
20 TABLET ORAL 2 TIMES DAILY
Qty: 10 TABLET | Refills: 0 | Status: ON HOLD | OUTPATIENT
Start: 2023-02-27 | End: 2023-04-12 | Stop reason: HOSPADM

## 2023-02-27 RX ORDER — AZITHROMYCIN 250 MG/1
TABLET, FILM COATED ORAL
Qty: 6 TABLET | Refills: 0 | Status: SHIPPED | OUTPATIENT
Start: 2023-02-27 | End: 2023-03-07

## 2023-02-27 NOTE — PROGRESS NOTES
Jayant Castro MD        PATIENT NAME: Nathan Hinton  : 1940  DATE: 23  MRN: [id number]      Billing Provider: Jayant Castro MD  Level of Service: [place] OFFICE/OUTPT VISIT, EST, LEVL IV, 30-39 MIN  Patient PCP Information       Provider PCP Type    Jayant Castro MD General            Reason for Visit / Chief Complaint: Referral (Needs referral to pain mangement) and Asthma (wheezing)       Update PCP  Update Chief Complaint         History of Present Illness / Problem Focused Workflow     Nathan Hinton presents to the clinic with Referral (Needs referral to pain mangement) and Asthma (wheezing)     Needs referral for chronic back pain.  Also, has URI symptoms.  Last visit 2018 and took last pain pill yesterday.      Asthma  He complains of wheezing. There is no cough. Pertinent negatives include no appetite change, chest pain, fever, headaches, rhinorrhea, sore throat or trouble swallowing. His past medical history is significant for asthma.     Review of Systems     Review of Systems   Constitutional:  Negative for activity change, appetite change, fever and unexpected weight change.   HENT:  Negative for congestion, rhinorrhea, sinus pressure, sinus pain, sore throat and trouble swallowing.    Eyes:  Negative for photophobia, pain, discharge and visual disturbance.   Respiratory:  Positive for wheezing. Negative for cough, chest tightness and stridor.    Cardiovascular:  Negative for chest pain, palpitations and leg swelling.   Gastrointestinal:  Negative for abdominal pain, blood in stool, constipation, diarrhea and nausea.   Endocrine: Negative for polydipsia, polyphagia and polyuria.   Genitourinary:  Negative for difficulty urinating, flank pain and hematuria.   Musculoskeletal:  Positive for back pain. Negative for arthralgias and neck pain.   Skin:  Negative for rash.   Allergic/Immunologic: Negative for food allergies.   Neurological:  Negative for dizziness, tremors, seizures, syncope,  weakness (global weakness) and headaches.   Psychiatric/Behavioral:  Negative for behavioral problems, confusion, decreased concentration, dysphoric mood and hallucinations. The patient is not nervous/anxious.       Medical / Social / Family History     Past Medical History:   Diagnosis Date    #[id number]     COPD (chronic obstructive pulmonary disease)     Hypertension     Sarcoma        Past Surgical History:   Procedure Laterality Date    ABDOMINAL AORTIC ANEURYSM REPAIR      BACK SURGERY      FLEXOR TENDON REPAIR Right 11/4/2021    Procedure: REPAIR, TENDON, FLEXOR;  Surgeon: Dani Degroot MD;  Location: [facility];  Service: Orthopedics;  Laterality: Right;    SURGICAL REMOVAL OF SARCOMA      TRIGGER FINGER RELEASE Right 11/4/2021    Procedure: RELEASE, TRIGGER FINGER;  Surgeon: Dani Degroot MD;  Location: [facility];  Service: Orthopedics;  Laterality: Right;    VASCULAR SURGERY         Social History    reports that he has quit smoking. His smoking use included cigarettes. He has a 30.00 pack-year smoking history. He has been exposed to tobacco smoke. His smokeless tobacco use includes chew. He reports current alcohol use. He reports that he does not use drugs.    Family History  's family history is not on file.    Medications and Allergies     Medications  No outpatient medications have been marked as taking for the 2/27/23 encounter (Office Visit) with Jayant Castro MD.       Allergies  Review of patient's allergies indicates:   Allergen Reactions    Sulfa (sulfonamide antibiotics) Hives       Physical Examination     Vitals:    02/27/23 0839   BP: 118/60   Pulse: 85   Resp: 20     Physical Exam  Constitutional:       General: He is not in acute distress.     Appearance: Normal appearance.   HENT:      Head: Normocephalic.      Right Ear: Tympanic membrane and ear canal normal.      Left Ear: Tympanic membrane and ear canal normal.      Nose: Nose normal.       Mouth/Throat:      Mouth: Mucous membranes are moist.      Pharynx: No oropharyngeal exudate.   Eyes:      Extraocular Movements: Extraocular movements intact.      Pupils: Pupils are equal, round, and reactive to light.   Cardiovascular:      Rate and Rhythm: Normal rate and regular rhythm.      Heart sounds: No murmur heard.  Pulmonary:      Effort: Pulmonary effort is normal.      Breath sounds: Wheezing present.   Abdominal:      General: Abdomen is flat. Bowel sounds are normal.      Palpations: Abdomen is soft.      Hernia: No hernia is present.   Musculoskeletal:         General: Normal range of motion.      Cervical back: Normal range of motion and neck supple.      Right lower leg: No edema.      Left lower leg: No edema.   Lymphadenopathy:      Cervical: No cervical adenopathy.   Skin:     General: Skin is warm and dry.      Coloration: Skin is not jaundiced.      Findings: No lesion.   Neurological:      General: No focal deficit present.      Mental Status: He is alert and oriented to person, place, and time.      Cranial Nerves: No cranial nerve deficit.      Gait: Gait normal.   Psychiatric:         Mood and Affect: Mood normal.         Behavior: Behavior normal.         Judgment: Judgment normal.        Assessment and Plan (including Health Maintenance)      Problem List  Smart Sets  Document Outside HM   :    Plan:     The current medical regimen is effective;  continue present plan and medications.      Health Maintenance Due   Topic Date Due    Pneumococcal Vaccines (Age 65+) (1 - PCV) Never done    TETANUS VACCINE  Never done    Shingles Vaccine (1 of 2) Never done    COVID-19 Vaccine (3 - Booster for Dejan series) 01/27/2022    Influenza Vaccine (1) 09/01/2022       Problem List Items Addressed This Visit          Pulmonary    COPD (chronic obstructive pulmonary disease) (Chronic)     Other Visit Diagnoses       Chronic bilateral low back pain without sciatica    -  Primary    Relevant  Medications    HYDROcodone-acetaminophen 7.5-300 mg Tab            Health Maintenance Topics with due status: Not Due       Topic Last Completion Date    Lipid Panel 08/03/2020       Future Appointments   Date Time Provider Department Center   5/15/2023  2:10 PM Randall Alicea MD [facility]   8/15/2023  2:00 PM Hoda Rivera MD [facility]        There are no Patient Instructions on file for this visit.  Follow up if symptoms worsen or fail to improve.     Signature:  Jayant Castro MD      Date of encounter: 2/27/23

## 2023-02-28 DIAGNOSIS — G89.29 CHRONIC BILATERAL LOW BACK PAIN WITHOUT SCIATICA: Primary | ICD-10-CM

## 2023-02-28 DIAGNOSIS — M54.50 CHRONIC BILATERAL LOW BACK PAIN WITHOUT SCIATICA: Primary | ICD-10-CM

## 2023-02-28 DIAGNOSIS — M54.50 CHRONIC BILATERAL LOW BACK PAIN WITHOUT SCIATICA: ICD-10-CM

## 2023-02-28 DIAGNOSIS — G89.29 CHRONIC BILATERAL LOW BACK PAIN WITHOUT SCIATICA: ICD-10-CM

## 2023-02-28 RX ORDER — HYDROCODONE BITARTRATE AND ACETAMINOPHEN 7.5; 3 MG/1; MG/1
1 TABLET ORAL 3 TIMES DAILY PRN
Qty: 60 TABLET | Refills: 0 | Status: SHIPPED | OUTPATIENT
Start: 2023-02-28 | End: 2023-02-28

## 2023-02-28 RX ORDER — HYDROCODONE BITARTRATE AND ACETAMINOPHEN 7.5; 325 MG/1; MG/1
1 TABLET ORAL EVERY 6 HOURS PRN
COMMUNITY
End: 2023-02-28

## 2023-02-28 RX ORDER — HYDROCODONE BITARTRATE AND ACETAMINOPHEN 7.5; 325 MG/1; MG/1
1 TABLET ORAL EVERY 6 HOURS PRN
Qty: 60 TABLET | Refills: 0 | Status: SHIPPED | OUTPATIENT
Start: 2023-02-28 | End: 2023-11-16 | Stop reason: CLARIF

## 2023-02-28 RX ORDER — HYDROCODONE BITARTRATE AND ACETAMINOPHEN 7.5; 325 MG/1; MG/1
1 TABLET ORAL EVERY 6 HOURS PRN
Qty: 60 TABLET | Refills: 0 | Status: SHIPPED | OUTPATIENT
Start: 2023-02-28 | End: 2023-02-28 | Stop reason: SDUPTHER

## 2023-02-28 NOTE — TELEPHONE ENCOUNTER
[facility] did not receive escribe of his norco yesterday.  Prescription was for 7.5/300 not avaliable reprinted for 7.5/325

## 2023-03-07 ENCOUNTER — HOSPITAL ENCOUNTER (EMERGENCY)
Facility: HOSPITAL | Age: 83
Discharge: HOME | End: 2023-03-07
Payer: MEDICARE

## 2023-03-07 VITALS
DIASTOLIC BLOOD PRESSURE: 60 MMHG | WEIGHT: 218 LBS | SYSTOLIC BLOOD PRESSURE: 112 MMHG | RESPIRATION RATE: 18 BRPM | TEMPERATURE: 98 F | HEART RATE: 91 BPM | HEIGHT: 75 IN | BODY MASS INDEX: 27.1 KG/M2 | OXYGEN SATURATION: 92 %

## 2023-03-07 DIAGNOSIS — M25.559 HIP PAIN: Primary | ICD-10-CM

## 2023-03-07 PROCEDURE — 99283 PR EMERGENCY DEPT VISIT,LEVEL III: ICD-10-PCS | Mod: ,,, | Performed by: NURSE PRACTITIONER

## 2023-03-07 PROCEDURE — 99283 EMERGENCY DEPT VISIT LOW MDM: CPT | Mod: ,,, | Performed by: NURSE PRACTITIONER

## 2023-03-07 PROCEDURE — 99283 EMERGENCY DEPT VISIT LOW MDM: CPT | Mod: 25

## 2023-03-07 RX ORDER — NAPROXEN 375 MG/1
375 TABLET ORAL 2 TIMES DAILY WITH MEALS
Qty: 10 TABLET | Refills: 0 | Status: SHIPPED | OUTPATIENT
Start: 2023-03-07 | End: 2023-03-12

## 2023-03-07 RX ORDER — KETOROLAC TROMETHAMINE 30 MG/ML
30 INJECTION, SOLUTION INTRAMUSCULAR; INTRAVENOUS
Status: DISCONTINUED | OUTPATIENT
Start: 2023-03-07 | End: 2023-03-07 | Stop reason: HOSPADM

## 2023-03-07 NOTE — ED PROVIDER NOTES
Encounter Date: 3/7/2023       History     Chief Complaint   Patient presents with    Leg Pain     82 year old male presents to the emergency department to be evaluated for right hip pain. His pain began 3 days ago. Denies any recent fall or injury.     The history is provided by the patient.   Leg Pain   There was no injury mechanism. The incident occurred several days ago. Pertinent negatives include no numbness, no inability to bear weight, no loss of motion, no muscle weakness, no loss of sensation and no tingling.   Review of patient's allergies indicates:   Allergen Reactions    Sulfa (sulfonamide antibiotics) Hives     Past Medical History:   Diagnosis Date    #[id number]     COPD (chronic obstructive pulmonary disease)     Hypertension     Sarcoma      Past Surgical History:   Procedure Laterality Date    ABDOMINAL AORTIC ANEURYSM REPAIR      BACK SURGERY      FLEXOR TENDON REPAIR Right 11/4/2021    Procedure: REPAIR, TENDON, FLEXOR;  Surgeon: Dani Degroot MD;  Location: [facility];  Service: Orthopedics;  Laterality: Right;    SURGICAL REMOVAL OF SARCOMA      TRIGGER FINGER RELEASE Right 11/4/2021    Procedure: RELEASE, TRIGGER FINGER;  Surgeon: Dani Degroot MD;  Location: [facility];  Service: Orthopedics;  Laterality: Right;    VASCULAR SURGERY       History reviewed. No pertinent family history.  Social History     Tobacco Use    Smoking status: Former     Packs/day: 1.00     Years: 30.00     Pack years: 30.00     Types: Cigarettes     Passive exposure: Past    Smokeless tobacco: Current     Types: Chew   Substance Use Topics    Alcohol use: Yes    Drug use: Never     Review of Systems   Constitutional:  Negative for chills and fever.   Gastrointestinal:  Negative for diarrhea, nausea and vomiting.   Neurological:  Negative for tingling and numbness.   All other systems reviewed and are negative.    Physical Exam     Initial Vitals [03/07/23 1106]   BP Pulse Resp Temp SpO2    112/60 91 18 98 °F (36.7 °C) (!) 92 %      MAP       --         Physical Exam    Vitals reviewed.  Constitutional: He appears well-developed and well-nourished.   Neck: Neck supple.   Cardiovascular:  Normal rate and regular rhythm.           Pulses:       Dorsalis pedis pulses are 2+ on the right side.        Posterior tibial pulses are 2+ on the right side.   Abdominal: Abdomen is soft. Bowel sounds are normal. He exhibits no distension and no mass. There is no abdominal tenderness. There is no rebound and no guarding.   Musculoskeletal:         General: Normal range of motion.      Cervical back: Neck supple.      Thoracic back: Normal.      Lumbar back: Normal.      Right upper leg: Normal.      Right knee: Normal.      Right lower leg: Normal.     Neurological: He is alert and oriented to person, place, and time. He has normal strength. GCS score is 15. GCS eye subscore is 4. GCS verbal subscore is 5. GCS motor subscore is 6.   Skin: Skin is warm and dry. Capillary refill takes less than 2 seconds.   Psychiatric: He has a normal mood and affect.       Medical Screening Exam   See Full Note    ED Course   Procedures  Labs Reviewed - No data to display       Imaging Results              X-Ray Hip 2 or 3 views Right (with Pelvis when performed) (Final result)  Result time 03/07/23 11:38:00      Final result by Melvin Andrade MD (03/07/23 11:38:00)                   Impression:      Osteoarthritis of the hips    No fracture or acute process      Electronically signed by: Melvin Andrade  Date:    03/07/2023  Time:    11:38               Narrative:    EXAMINATION:  XR HIP WITH PELVIS WHEN PERFORMED, 2 OR 3  VIEWS RIGHT    CLINICAL HISTORY:  right hip pain;.    COMPARISON:  March 9, 2020 right hip x-ray    TECHNIQUE:  AP and frog-leg lateral views right hip to include AP pelvis    FINDINGS:  Hips are well conjugated.  There is mild osteophyte formation of either hip with mild degenerative joint space  narrowing.  There is no acute fracture or aggressive osseous lesion.  There is moderate degenerative disc narrowing in the partially visualized lower lumbar spine.    Metallic stent graft material overlies the level of the distal aorta and proximal common iliac arteries                                       Medications   ketorolac injection 30 mg (has no administration in time range)     Medical Decision Makin year old male presents to the emergency department to be evaluated for right hip pain. His pain began 3 days ago. Denies any recent fall or injury.   I ordered X-rays and personally reviewed them and reviewed the radiologist interpretation.  Xray significant for no acute process.    Patient was discharged in stable condition.  Detailed return precautions discussed.                   Clinical Impression:   Final diagnoses:  [M25.559] Hip pain (Primary)        ED Disposition Condition    Discharge Stable          ED Prescriptions       Medication Sig Dispense Start Date End Date Auth. Provider    naproxen (NAPROSYN) 375 MG tablet Take 1 tablet (375 mg total) by mouth 2 (two) times daily with meals. for 5 days 10 tablet 3/7/2023 3/12/2023 DANICA Leo          Follow-up Information    None          DANICA Leo  [phone]

## 2023-04-07 ENCOUNTER — HOSPITAL ENCOUNTER (INPATIENT)
Facility: HOSPITAL | Age: 83
LOS: 5 days | Discharge: HOME HEALTH | DRG: 208 | End: 2023-04-12
Attending: EMERGENCY MEDICINE | Admitting: STUDENT IN AN ORGANIZED HEALTH CARE EDUCATION/TRAINING PROGRAM
Payer: MEDICARE

## 2023-04-07 DIAGNOSIS — E87.29 RESPIRATORY ACIDOSIS: ICD-10-CM

## 2023-04-07 DIAGNOSIS — R06.02 SHORTNESS OF BREATH: ICD-10-CM

## 2023-04-07 DIAGNOSIS — J96.22 ACUTE ON CHRONIC RESPIRATORY FAILURE WITH HYPERCAPNIA: ICD-10-CM

## 2023-04-07 DIAGNOSIS — I48.91 A-FIB: ICD-10-CM

## 2023-04-07 DIAGNOSIS — J44.1 ACUTE EXACERBATION OF CHRONIC OBSTRUCTIVE PULMONARY DISEASE (COPD): Primary | ICD-10-CM

## 2023-04-07 PROBLEM — M65.331 TRIGGER FINGER, RIGHT MIDDLE FINGER: Status: RESOLVED | Noted: 2021-10-11 | Resolved: 2023-04-07

## 2023-04-07 PROBLEM — N18.30 CKD (CHRONIC KIDNEY DISEASE), STAGE III: Status: ACTIVE | Noted: 2023-04-07

## 2023-04-07 LAB
ALBUMIN SERPL BCP-MCNC: 3.1 G/DL (ref 3.5–5)
ALBUMIN/GLOB SERPL: 0.7 {RATIO}
ALP SERPL-CCNC: 85 U/L (ref 45–115)
ALT SERPL W P-5'-P-CCNC: 25 U/L (ref 16–61)
ANION GAP SERPL CALCULATED.3IONS-SCNC: 6 MMOL/L (ref 7–16)
AST SERPL W P-5'-P-CCNC: 25 U/L (ref 15–37)
BASOPHILS # BLD AUTO: 0.01 K/UL (ref 0–0.2)
BASOPHILS NFR BLD AUTO: 0.2 % (ref 0–1)
BILIRUB SERPL-MCNC: 0.7 MG/DL (ref ?–1.2)
BILIRUB UR QL STRIP: NEGATIVE
BUN SERPL-MCNC: 27 MG/DL (ref 7–18)
BUN/CREAT SERPL: 17 (ref 6–20)
CALCIUM SERPL-MCNC: 9.6 MG/DL (ref 8.5–10.1)
CHLORIDE SERPL-SCNC: 102 MMOL/L (ref 98–107)
CLARITY UR: CLEAR
CO2 SERPL-SCNC: 39 MMOL/L (ref 21–32)
COLOR UR: YELLOW
CREAT SERPL-MCNC: 1.63 MG/DL (ref 0.7–1.3)
D DIMER PPP FEU-MCNC: 2.32 ΜG/ML (ref 0–0.47)
DIFFERENTIAL METHOD BLD: ABNORMAL
EGFR (NO RACE VARIABLE) (RUSH/TITUS): 42 ML/MIN/1.73M²
EOSINOPHIL # BLD AUTO: 0.17 K/UL (ref 0–0.5)
EOSINOPHIL NFR BLD AUTO: 2.8 % (ref 1–4)
EOSINOPHIL NFR BLD MANUAL: 2 % (ref 1–4)
ERYTHROCYTE [DISTWIDTH] IN BLOOD BY AUTOMATED COUNT: 14.3 % (ref 11.5–14.5)
GLOBULIN SER-MCNC: 4.3 G/DL (ref 2–4)
GLUCOSE SERPL-MCNC: 121 MG/DL (ref 74–106)
GLUCOSE UR STRIP-MCNC: NORMAL MG/DL
HCO3 UR-SCNC: 33.7 MMOL/L (ref 21–28)
HCO3 UR-SCNC: 34.2 MMOL/L (ref 21–28)
HCO3 UR-SCNC: 37.2 MMOL/L (ref 21–28)
HCT VFR BLD AUTO: 39.2 % (ref 40–54)
HCT VFR BLD CALC: 38 % (ref 35–51)
HCT VFR BLD CALC: 38 % (ref 35–51)
HCT VFR BLD CALC: 41 % (ref 35–51)
HGB BLD-MCNC: 11.7 G/DL (ref 13.5–18)
IMM GRANULOCYTES # BLD AUTO: 0.05 K/UL (ref 0–0.04)
IMM GRANULOCYTES NFR BLD: 0.8 % (ref 0–0.4)
KETONES UR STRIP-SCNC: NEGATIVE MG/DL
LACTATE SERPL-SCNC: 1.4 MMOL/L (ref 0.4–2)
LDH SERPL L TO P-CCNC: 0.6 MMOL/L (ref 0.3–1.2)
LEUKOCYTE ESTERASE UR QL STRIP: NEGATIVE
LYMPHOCYTES # BLD AUTO: 0.58 K/UL (ref 1–4.8)
LYMPHOCYTES NFR BLD AUTO: 9.4 % (ref 27–41)
LYMPHOCYTES NFR BLD MANUAL: 15 % (ref 27–41)
MCH RBC QN AUTO: 30 PG (ref 27–31)
MCHC RBC AUTO-ENTMCNC: 29.8 G/DL (ref 32–36)
MCV RBC AUTO: 100.5 FL (ref 80–96)
MONOCYTES # BLD AUTO: 0.83 K/UL (ref 0–0.8)
MONOCYTES NFR BLD AUTO: 13.5 % (ref 2–6)
MONOCYTES NFR BLD MANUAL: 11 % (ref 2–6)
MPC BLD CALC-MCNC: 9.8 FL (ref 9.4–12.4)
NEUTROPHILS # BLD AUTO: 4.52 K/UL (ref 1.8–7.7)
NEUTROPHILS NFR BLD AUTO: 73.3 % (ref 53–65)
NEUTS BAND NFR BLD MANUAL: 2 % (ref 1–5)
NEUTS SEG NFR BLD MANUAL: 70 % (ref 50–62)
NITRITE UR QL STRIP: NEGATIVE
NRBC # BLD AUTO: 0 X10E3/UL
NRBC, AUTO (.00): 0 %
NT-PROBNP SERPL-MCNC: 1228 PG/ML (ref 1–450)
PCO2 BLDA: 61 MMHG (ref 35–48)
PCO2 BLDA: 62 MMHG (ref 35–48)
PCO2 BLDA: 83 MMHG (ref 35–48)
PH SMN: 7.26 [PH] (ref 7.35–7.45)
PH SMN: 7.35 [PH] (ref 7.35–7.45)
PH SMN: 7.35 [PH] (ref 7.35–7.45)
PH UR STRIP: 5.5 PH UNITS
PLATELET # BLD AUTO: 159 K/UL (ref 150–400)
PLATELET MORPHOLOGY: ABNORMAL
PO2 BLDA: 226 MMHG (ref 83–108)
PO2 BLDA: 54 MMHG (ref 83–108)
PO2 BLDA: 54 MMHG (ref 83–108)
POC BASE EXCESS: 6.4 MMOL/L (ref -2–3)
POC BASE EXCESS: 6.8 MMOL/L (ref -2–3)
POC BASE EXCESS: 7.2 MMOL/L (ref -2–3)
POC CO2: 35.6 MMOL/L
POC CO2: 36.1 MMOL/L
POC CO2: 39.7 MMOL/L
POC IONIZED CALCIUM: 1.16 MMOL/L (ref 1.15–1.35)
POC IONIZED CALCIUM: 1.18 MMOL/L (ref 1.15–1.35)
POC IONIZED CALCIUM: 1.24 MMOL/L (ref 1.15–1.35)
POC SATURATED O2: 100 % (ref 95–98)
POC SATURATED O2: 86 % (ref 95–98)
POC SATURATED O2: 86 % (ref 95–98)
POCT GLUCOSE: 100 MG/DL (ref 60–95)
POCT GLUCOSE: 110 MG/DL (ref 60–95)
POCT GLUCOSE: 116 MG/DL (ref 60–95)
POTASSIUM BLD-SCNC: 4.7 MMOL/L (ref 3.4–4.5)
POTASSIUM BLD-SCNC: 4.8 MMOL/L (ref 3.4–4.5)
POTASSIUM BLD-SCNC: 4.8 MMOL/L (ref 3.4–4.5)
POTASSIUM SERPL-SCNC: 4.9 MMOL/L (ref 3.5–5.1)
PROT SERPL-MCNC: 7.4 G/DL (ref 6.4–8.2)
PROT UR QL STRIP: 70
RBC # BLD AUTO: 3.9 M/UL (ref 4.6–6.2)
RBC # UR STRIP: NEGATIVE /UL
RBC MORPH BLD: NORMAL
SARS-COV-2 RDRP RESP QL NAA+PROBE: NEGATIVE
SODIUM BLD-SCNC: 136 MMOL/L (ref 136–145)
SODIUM BLD-SCNC: 137 MMOL/L (ref 136–145)
SODIUM BLD-SCNC: 137 MMOL/L (ref 136–145)
SODIUM SERPL-SCNC: 142 MMOL/L (ref 136–145)
SP GR UR STRIP: 1.02
TROPONIN I SERPL HS-MCNC: 52.2 PG/ML
TROPONIN I SERPL HS-MCNC: 57.6 PG/ML
UROBILINOGEN UR STRIP-ACNC: NORMAL MG/DL
WBC # BLD AUTO: 6.16 K/UL (ref 4.5–11)

## 2023-04-07 PROCEDURE — 94761 N-INVAS EAR/PLS OXIMETRY MLT: CPT

## 2023-04-07 PROCEDURE — 82947 ASSAY GLUCOSE BLOOD QUANT: CPT

## 2023-04-07 PROCEDURE — 25000003 PHARM REV CODE 250: Performed by: EMERGENCY MEDICINE

## 2023-04-07 PROCEDURE — 99900026 HC AIRWAY MAINTENANCE (STAT)

## 2023-04-07 PROCEDURE — 93010 ELECTROCARDIOGRAM REPORT: CPT | Mod: ,,, | Performed by: HOSPITALIST

## 2023-04-07 PROCEDURE — 99291 CRITICAL CARE FIRST HOUR: CPT

## 2023-04-07 PROCEDURE — 84484 ASSAY OF TROPONIN QUANT: CPT | Performed by: NURSE PRACTITIONER

## 2023-04-07 PROCEDURE — 82330 ASSAY OF CALCIUM: CPT

## 2023-04-07 PROCEDURE — 87635 SARS-COV-2 COVID-19 AMP PRB: CPT | Performed by: EMERGENCY MEDICINE

## 2023-04-07 PROCEDURE — 99223 1ST HOSP IP/OBS HIGH 75: CPT | Mod: AI,,, | Performed by: NURSE PRACTITIONER

## 2023-04-07 PROCEDURE — 93010 EKG 12-LEAD: ICD-10-PCS | Mod: ,,, | Performed by: HOSPITALIST

## 2023-04-07 PROCEDURE — 27000221 HC OXYGEN, UP TO 24 HOURS

## 2023-04-07 PROCEDURE — 93005 ELECTROCARDIOGRAM TRACING: CPT

## 2023-04-07 PROCEDURE — 25000003 PHARM REV CODE 250: Performed by: NURSE PRACTITIONER

## 2023-04-07 PROCEDURE — 25500020 PHARM REV CODE 255: Performed by: STUDENT IN AN ORGANIZED HEALTH CARE EDUCATION/TRAINING PROGRAM

## 2023-04-07 PROCEDURE — 81003 URINALYSIS AUTO W/O SCOPE: CPT | Performed by: EMERGENCY MEDICINE

## 2023-04-07 PROCEDURE — 84132 ASSAY OF SERUM POTASSIUM: CPT

## 2023-04-07 PROCEDURE — 85379 FIBRIN DEGRADATION QUANT: CPT | Performed by: EMERGENCY MEDICINE

## 2023-04-07 PROCEDURE — 99291 CRITICAL CARE FIRST HOUR: CPT | Mod: ,,, | Performed by: EMERGENCY MEDICINE

## 2023-04-07 PROCEDURE — 25000242 PHARM REV CODE 250 ALT 637 W/ HCPCS: Performed by: NURSE PRACTITIONER

## 2023-04-07 PROCEDURE — 99900035 HC TECH TIME PER 15 MIN (STAT)

## 2023-04-07 PROCEDURE — 84484 ASSAY OF TROPONIN QUANT: CPT | Performed by: EMERGENCY MEDICINE

## 2023-04-07 PROCEDURE — 83605 ASSAY OF LACTIC ACID: CPT

## 2023-04-07 PROCEDURE — 82803 BLOOD GASES ANY COMBINATION: CPT

## 2023-04-07 PROCEDURE — 80053 COMPREHEN METABOLIC PANEL: CPT | Performed by: EMERGENCY MEDICINE

## 2023-04-07 PROCEDURE — 25000242 PHARM REV CODE 250 ALT 637 W/ HCPCS: Performed by: EMERGENCY MEDICINE

## 2023-04-07 PROCEDURE — 94640 AIRWAY INHALATION TREATMENT: CPT

## 2023-04-07 PROCEDURE — 84295 ASSAY OF SERUM SODIUM: CPT

## 2023-04-07 PROCEDURE — 96374 THER/PROPH/DIAG INJ IV PUSH: CPT

## 2023-04-07 PROCEDURE — 20000000 HC ICU ROOM

## 2023-04-07 PROCEDURE — 63600175 PHARM REV CODE 636 W HCPCS: Performed by: NURSE PRACTITIONER

## 2023-04-07 PROCEDURE — 85025 COMPLETE CBC W/AUTO DIFF WBC: CPT | Performed by: EMERGENCY MEDICINE

## 2023-04-07 PROCEDURE — 25000242 PHARM REV CODE 250 ALT 637 W/ HCPCS: Performed by: STUDENT IN AN ORGANIZED HEALTH CARE EDUCATION/TRAINING PROGRAM

## 2023-04-07 PROCEDURE — 36600 WITHDRAWAL OF ARTERIAL BLOOD: CPT

## 2023-04-07 PROCEDURE — 96375 TX/PRO/DX INJ NEW DRUG ADDON: CPT

## 2023-04-07 PROCEDURE — 83605 ASSAY OF LACTIC ACID: CPT | Performed by: EMERGENCY MEDICINE

## 2023-04-07 PROCEDURE — 94002 VENT MGMT INPAT INIT DAY: CPT

## 2023-04-07 PROCEDURE — 99223 PR INITIAL HOSPITAL CARE,LEVL III: ICD-10-PCS | Mod: AI,,, | Performed by: NURSE PRACTITIONER

## 2023-04-07 PROCEDURE — 27200966 HC CLOSED SUCTION SYSTEM

## 2023-04-07 PROCEDURE — 85014 HEMATOCRIT: CPT

## 2023-04-07 PROCEDURE — 83880 ASSAY OF NATRIURETIC PEPTIDE: CPT | Performed by: EMERGENCY MEDICINE

## 2023-04-07 PROCEDURE — 99291 PR CRITICAL CARE, E/M 30-74 MINUTES: ICD-10-PCS | Mod: ,,, | Performed by: EMERGENCY MEDICINE

## 2023-04-07 PROCEDURE — 87040 BLOOD CULTURE FOR BACTERIA: CPT | Performed by: EMERGENCY MEDICINE

## 2023-04-07 PROCEDURE — 96361 HYDRATE IV INFUSION ADD-ON: CPT

## 2023-04-07 PROCEDURE — 25000003 PHARM REV CODE 250: Performed by: STUDENT IN AN ORGANIZED HEALTH CARE EDUCATION/TRAINING PROGRAM

## 2023-04-07 PROCEDURE — 63600175 PHARM REV CODE 636 W HCPCS: Performed by: EMERGENCY MEDICINE

## 2023-04-07 PROCEDURE — 51702 INSERT TEMP BLADDER CATH: CPT

## 2023-04-07 RX ORDER — METHYLPREDNISOLONE SOD SUCC 125 MG
125 VIAL (EA) INJECTION
Status: COMPLETED | OUTPATIENT
Start: 2023-04-07 | End: 2023-04-07

## 2023-04-07 RX ORDER — FENTANYL CITRATE 50 UG/ML
50 INJECTION, SOLUTION INTRAMUSCULAR; INTRAVENOUS
Status: COMPLETED | OUTPATIENT
Start: 2023-04-07 | End: 2023-04-07

## 2023-04-07 RX ORDER — SODIUM CHLORIDE 9 MG/ML
INJECTION, SOLUTION INTRAVENOUS
Status: COMPLETED
Start: 2023-04-07 | End: 2023-04-08

## 2023-04-07 RX ORDER — ENOXAPARIN SODIUM 100 MG/ML
1 INJECTION SUBCUTANEOUS
Status: DISCONTINUED | OUTPATIENT
Start: 2023-04-07 | End: 2023-04-09

## 2023-04-07 RX ORDER — HEPARIN SODIUM 5000 [USP'U]/ML
5000 INJECTION, SOLUTION INTRAVENOUS; SUBCUTANEOUS EVERY 8 HOURS
Status: DISCONTINUED | OUTPATIENT
Start: 2023-04-07 | End: 2023-04-07

## 2023-04-07 RX ORDER — ASPIRIN 81 MG/1
81 TABLET ORAL DAILY
Status: DISCONTINUED | OUTPATIENT
Start: 2023-04-07 | End: 2023-04-12 | Stop reason: HOSPADM

## 2023-04-07 RX ORDER — BUDESONIDE 0.5 MG/2ML
0.5 INHALANT ORAL EVERY 12 HOURS
Status: DISCONTINUED | OUTPATIENT
Start: 2023-04-07 | End: 2023-04-12 | Stop reason: HOSPADM

## 2023-04-07 RX ORDER — ATORVASTATIN CALCIUM 80 MG/1
80 TABLET, FILM COATED ORAL DAILY
Status: DISCONTINUED | OUTPATIENT
Start: 2023-04-07 | End: 2023-04-12 | Stop reason: HOSPADM

## 2023-04-07 RX ORDER — IPRATROPIUM BROMIDE AND ALBUTEROL SULFATE 2.5; .5 MG/3ML; MG/3ML
9 SOLUTION RESPIRATORY (INHALATION)
Status: COMPLETED | OUTPATIENT
Start: 2023-04-07 | End: 2023-04-07

## 2023-04-07 RX ORDER — LEVALBUTEROL INHALATION SOLUTION 1.25 MG/3ML
1.25 SOLUTION RESPIRATORY (INHALATION) EVERY 8 HOURS
Status: DISCONTINUED | OUTPATIENT
Start: 2023-04-07 | End: 2023-04-10

## 2023-04-07 RX ORDER — IPRATROPIUM BROMIDE 0.5 MG/2.5ML
0.5 SOLUTION RESPIRATORY (INHALATION) EVERY 6 HOURS
Status: DISCONTINUED | OUTPATIENT
Start: 2023-04-07 | End: 2023-04-12 | Stop reason: HOSPADM

## 2023-04-07 RX ORDER — PROPOFOL 10 MG/ML
0-50 INJECTION, EMULSION INTRAVENOUS CONTINUOUS
Status: DISCONTINUED | OUTPATIENT
Start: 2023-04-07 | End: 2023-04-09

## 2023-04-07 RX ORDER — MUPIROCIN 20 MG/G
OINTMENT TOPICAL 2 TIMES DAILY
Status: COMPLETED | OUTPATIENT
Start: 2023-04-07 | End: 2023-04-11

## 2023-04-07 RX ADMIN — ATORVASTATIN CALCIUM 80 MG: 80 TABLET, FILM COATED ORAL at 02:04

## 2023-04-07 RX ADMIN — SODIUM CHLORIDE 1000 ML: 9 INJECTION, SOLUTION INTRAVENOUS at 04:04

## 2023-04-07 RX ADMIN — PROPOFOL 5 MCG/KG/MIN: 10 INJECTION, EMULSION INTRAVENOUS at 11:04

## 2023-04-07 RX ADMIN — ENOXAPARIN SODIUM 100 MG: 100 INJECTION SUBCUTANEOUS at 06:04

## 2023-04-07 RX ADMIN — ASPIRIN 81 MG: 81 TABLET, DELAYED RELEASE ORAL at 02:04

## 2023-04-07 RX ADMIN — FENTANYL CITRATE 50 MCG: 50 INJECTION, SOLUTION INTRAMUSCULAR; INTRAVENOUS at 10:04

## 2023-04-07 RX ADMIN — METHYLPREDNISOLONE SODIUM SUCCINATE 60 MG: 40 INJECTION, POWDER, FOR SOLUTION INTRAMUSCULAR; INTRAVENOUS at 11:04

## 2023-04-07 RX ADMIN — IPRATROPIUM BROMIDE AND ALBUTEROL SULFATE 9 ML: 2.5; .5 SOLUTION RESPIRATORY (INHALATION) at 09:04

## 2023-04-07 RX ADMIN — METHYLPREDNISOLONE SODIUM SUCCINATE 125 MG: 125 INJECTION, POWDER, FOR SOLUTION INTRAMUSCULAR; INTRAVENOUS at 10:04

## 2023-04-07 RX ADMIN — IOPAMIDOL 100 ML: 755 INJECTION, SOLUTION INTRAVENOUS at 12:04

## 2023-04-07 RX ADMIN — MUPIROCIN: 20 OINTMENT TOPICAL at 08:04

## 2023-04-07 RX ADMIN — LEVALBUTEROL HYDROCHLORIDE 1.25 MG: 1.25 SOLUTION RESPIRATORY (INHALATION) at 09:04

## 2023-04-07 RX ADMIN — METHYLPREDNISOLONE SODIUM SUCCINATE 60 MG: 40 INJECTION, POWDER, FOR SOLUTION INTRAMUSCULAR; INTRAVENOUS at 06:04

## 2023-04-07 RX ADMIN — MUPIROCIN: 20 OINTMENT TOPICAL at 11:04

## 2023-04-07 RX ADMIN — PROPOFOL 40 MCG/KG/MIN: 10 INJECTION, EMULSION INTRAVENOUS at 04:04

## 2023-04-07 RX ADMIN — AZITHROMYCIN DIHYDRATE 500 MG: 500 INJECTION, POWDER, LYOPHILIZED, FOR SOLUTION INTRAVENOUS at 02:04

## 2023-04-07 RX ADMIN — SODIUM CHLORIDE 1000 ML: 9 INJECTION, SOLUTION INTRAVENOUS at 10:04

## 2023-04-07 RX ADMIN — BUDESONIDE 0.5 MG: 0.5 INHALANT ORAL at 07:04

## 2023-04-07 RX ADMIN — DEXTROSE MONOHYDRATE 1 G: 5 INJECTION INTRAVENOUS at 02:04

## 2023-04-07 RX ADMIN — IPRATROPIUM BROMIDE 0.5 MG: 0.5 SOLUTION RESPIRATORY (INHALATION) at 09:04

## 2023-04-07 RX ADMIN — HEPARIN SODIUM 5000 UNITS: 5000 INJECTION, SOLUTION INTRAVENOUS; SUBCUTANEOUS at 01:04

## 2023-04-07 NOTE — ED TRIAGE NOTES
Presents to ED via EMS from home for respiratory distress. EMS reports patient was agonal upon arrival and only responsive to painful stimuli. Patient intubated PTA by EMS.

## 2023-04-07 NOTE — SUBJECTIVE & OBJECTIVE
Past Medical History:   Diagnosis Date    #[id number]     COPD (chronic obstructive pulmonary disease)     Hypertension     Sarcoma        Past Surgical History:   Procedure Laterality Date    ABDOMINAL AORTIC ANEURYSM REPAIR      BACK SURGERY      FLEXOR TENDON REPAIR Right 11/4/2021    Procedure: REPAIR, TENDON, FLEXOR;  Surgeon: Dani Degroot MD;  Location: [facility];  Service: Orthopedics;  Laterality: Right;    SURGICAL REMOVAL OF SARCOMA      TRIGGER FINGER RELEASE Right 11/4/2021    Procedure: RELEASE, TRIGGER FINGER;  Surgeon: Dani Degroot MD;  Location: [facility];  Service: Orthopedics;  Laterality: Right;    VASCULAR SURGERY         Review of patient's allergies indicates:   Allergen Reactions    Sulfa (sulfonamide antibiotics) Hives       Family History    None       Tobacco Use    Smoking status: Former     Packs/day: 1.00     Years: 30.00     Pack years: 30.00     Types: Cigarettes     Passive exposure: Past    Smokeless tobacco: Current     Types: Chew   Substance and Sexual Activity    Alcohol use: Yes    Drug use: Never    Sexual activity: Not Currently         Review of Systems   Unable to perform ROS: Intubated   Objective:     Vital Signs (Most Recent):  Temp: 100 °F (37.8 °C) (04/07/23 1003)  Pulse: 94 (04/07/23 1348)  Resp: 18 (04/07/23 1151)  BP: (!) 100/56 (04/07/23 1348)  SpO2: 96 % (04/07/23 1348)   Vital Signs (24h Range):  Temp:  [100 °F (37.8 °C)] 100 °F (37.8 °C)  Pulse:  [] 94  Resp:  [15-22] 18  SpO2:  [89 %-100 %] 96 %  BP: ()/(50-66) 100/56     Weight: 95.3 kg (210 lb)  Body mass index is 26.25 kg/m².      Intake/Output Summary (Last 24 hours) at 4/7/2023 1406  Last data filed at 4/7/2023 1232  Gross per 24 hour   Intake 1000 ml   Output --   Net 1000 ml       Physical Exam  Vitals reviewed.   Constitutional:       Appearance: He is obese.      Interventions: He is sedated and intubated.   HENT:      Right Ear: External ear normal.      Left  Ear: External ear normal.      Mouth/Throat:      Mouth: Mucous membranes are moist.      Pharynx: Oropharynx is clear.   Eyes:      Extraocular Movements: Extraocular movements intact.      Conjunctiva/sclera: Conjunctivae normal.   Cardiovascular:      Rate and Rhythm: Normal rate and regular rhythm.      Pulses: Normal pulses.      Heart sounds: Normal heart sounds.   Pulmonary:      Effort: He is intubated.      Breath sounds: Rhonchi present.   Abdominal:      General: Abdomen is flat. Bowel sounds are normal.      Palpations: Abdomen is soft.   Musculoskeletal:         General: Normal range of motion.      Cervical back: Normal range of motion.   Skin:     General: Skin is warm and dry.      Capillary Refill: Capillary refill takes less than 2 seconds.       Vents:  Vent Mode: A/C (04/07/23 1015)  Set Rate: 18 BPM (04/07/23 1015)  Vt Set: 500 mL (04/07/23 1015)  PEEP/CPAP: 5 cmH20 (04/07/23 1015)  Oxygen Concentration (%): 100 (04/07/23 1015)  Total Ve: 11 L/m (04/07/23 1015)    Lines/Drains/Airways       Drain  Duration                  NG/OG Tube 04/07/23 1035 [place] sump 16 Fr. Right mouth <1 day         Urethral Catheter 04/07/23 1000 Non-latex 16 Fr. <1 day              Airway  Duration                  Airway - Non-Surgical 04/07/23 0929 <1 day              Peripheral Intravenous Line  Duration                  Peripheral IV - Single Lumen 04/07/23 0910 18 G Left Antecubital <1 day         Peripheral IV - Single Lumen 04/07/23 0920 18 G Posterior;Right Hand <1 day         Peripheral IV - Single Lumen 04/07/23 1100 20 G Left;Posterior Hand <1 day                    Significant Labs:    CBC/Anemia Profile:  Recent Labs   Lab 04/07/23  1003 04/07/23  1005   WBC 6.16  --    HGB 11.7*  --    HCT 39.2* 41     --    .5*  --    RDW 14.3  --         Chemistries:  Recent Labs   Lab 04/07/23  1003      K 4.9      CO2 39*   BUN 27*   CREATININE 1.63*   CALCIUM 9.6   ALBUMIN 3.1*   PROT 7.4    BILITOT 0.7   ALKPHOS 85   ALT 25   AST 25       All pertinent labs within the past 24 hours have been reviewed.    Significant Imaging:   I have reviewed all pertinent imaging results/findings within the past 24 hours.

## 2023-04-07 NOTE — PLAN OF CARE
Problem: Infection  Goal: Absence of Infection Signs and Symptoms  Outcome: Ongoing, Progressing     Problem: Adult Inpatient Plan of Care  Goal: Plan of Care Review  Outcome: Ongoing, Progressing  Goal: Patient-Specific Goal (Individualized)  Outcome: Ongoing, Progressing  Goal: Absence of Hospital-Acquired Illness or Injury  Outcome: Ongoing, Progressing  Goal: Optimal Comfort and Wellbeing  Outcome: Ongoing, Progressing  Goal: Readiness for Transition of Care  Outcome: Ongoing, Progressing     Problem: Fall Injury Risk  Goal: Absence of Fall and Fall-Related Injury  Outcome: Ongoing, Progressing     Problem: Restraint, Nonbehavioral (Nonviolent)  Goal: Absence of Harm or Injury  Outcome: Ongoing, Progressing     Problem: Communication Impairment (Mechanical Ventilation, Invasive)  Goal: Effective Communication  Outcome: Ongoing, Progressing     Problem: Device-Related Complication Risk (Mechanical Ventilation, Invasive)  Goal: Optimal Device Function  Outcome: Ongoing, Progressing     Problem: Inability to Wean (Mechanical Ventilation, Invasive)  Goal: Mechanical Ventilation Liberation  Outcome: Ongoing, Progressing     Problem: Nutrition Impairment (Mechanical Ventilation, Invasive)  Goal: Optimal Nutrition Delivery  Outcome: Ongoing, Progressing     Problem: Skin and Tissue Injury (Mechanical Ventilation, Invasive)  Goal: Absence of Device-Related Skin and Tissue Injury  Outcome: Ongoing, Progressing     Problem: Ventilator-Induced Lung Injury (Mechanical Ventilation, Invasive)  Goal: Absence of Ventilator-Induced Lung Injury  Outcome: Ongoing, Progressing     Problem: Communication Impairment (Artificial Airway)  Goal: Effective Communication  Outcome: Ongoing, Progressing     Problem: Device-Related Complication Risk (Artificial Airway)  Goal: Optimal Device Function  Outcome: Ongoing, Progressing     Problem: Skin and Tissue Injury (Artificial Airway)  Goal: Absence of Device-Related Skin or Tissue  Injury  Outcome: Ongoing, Progressing     Problem: Noninvasive Ventilation Acute  Goal: Effective Unassisted Ventilation and Oxygenation  Outcome: Ongoing, Progressing     Problem: Skin Injury Risk Increased  Goal: Skin Health and Integrity  Outcome: Ongoing, Progressing

## 2023-04-07 NOTE — HPI
82-year-old male who presented to the emergency department orally intubated.  Per report he called EMS complaints of shortness of breath and had to be intubated prior to arrival.  Past medical history significant for COPD and hypertension.  Unable to obtain any further review of systems or medical history.  No family at bedside.  Patient will be admitted to the critical care unit for hypercapnic respiratory failure requiring intubation.    Medical records reviewed  - [facility] of COPD (follows with Dr. Alicea), HTN and chronic back pain, CKD stage 3

## 2023-04-07 NOTE — ASSESSMENT & PLAN NOTE
Intubated prior to arrival   - ABGs on AC RR 18, , peep 5, fio2 50% -- 7.35/61/54  - CT chest without PE- concern for pneumonia in RLL  - steroids, bronchodilators, azithromycin and rocephin   - rest on vent today - start weaning in AM   - CT head negative

## 2023-04-07 NOTE — ASSESSMENT & PLAN NOTE
New onset- Afib rate 117 on EKG in ER   - likely secondary to pulmonary disease   - PE ruled out   - will check Echo and start wt based Lovenox   - trop WNL -- repeat trop and EKG in 2 hours to r/o MI  - CHADs 2 score - 2

## 2023-04-07 NOTE — Clinical Note
Diagnosis: Acute exacerbation of chronic obstructive pulmonary disease (COPD) [550199]   Admitting Provider:: SANTY CIFUENTES [[id number]]   Future Attending Provider: SANTY CIFUENTES [[id number]]   Reason for IP Medical Treatment  (Clinical interventions that can only be accomplished in the IP setting? ) :: Patient requires mechanical ventilation and intravenous medications.   I certify that Inpatient services for greater than or equal to 2 midnights are medically necessary:: Yes   Plans for Post-Acute care--if anticipated (pick the single best option):: A. No post acute care anticipated at this time   Special Needs:: No Special Needs [1]

## 2023-04-07 NOTE — H&P
[facility] - Emergency Department  Pulmonology  H&P    Patient Name: Nathan Hinton  MRN: [id number]  Admission Date: 4/7/2023  Code Status: Prior  Primary Care Provider: Jayant Castro MD   Principal Problem: Acute exacerbation of chronic obstructive pulmonary disease (COPD)    Subjective:     HPI:  82-year-old male who presented to the emergency department orally intubated.  Per report he called EMS complaints of shortness of breath and had to be intubated prior to arrival.  Past medical history significant for COPD and hypertension.  Unable to obtain any further review of systems or medical history.  No family at bedside.  Patient will be admitted to the critical care unit for hypercapnic respiratory failure requiring intubation.    Medical records reviewed  - [facility] of COPD (follows with Dr. Alicea), HTN and chronic back pain, CKD stage 3       Past Medical History:   Diagnosis Date    #[id number]     COPD (chronic obstructive pulmonary disease)     Hypertension     Sarcoma        Past Surgical History:   Procedure Laterality Date    ABDOMINAL AORTIC ANEURYSM REPAIR      BACK SURGERY      FLEXOR TENDON REPAIR Right 11/4/2021    Procedure: REPAIR, TENDON, FLEXOR;  Surgeon: Dani Degroot MD;  Location: [facility];  Service: Orthopedics;  Laterality: Right;    SURGICAL REMOVAL OF SARCOMA      TRIGGER FINGER RELEASE Right 11/4/2021    Procedure: RELEASE, TRIGGER FINGER;  Surgeon: Dani Degroot MD;  Location: [facility];  Service: Orthopedics;  Laterality: Right;    VASCULAR SURGERY         Review of patient's allergies indicates:   Allergen Reactions    Sulfa (sulfonamide antibiotics) Hives       Family History    None       Tobacco Use    Smoking status: Former     Packs/day: 1.00     Years: 30.00     Pack years: 30.00     Types: Cigarettes     Passive exposure: Past    Smokeless tobacco: Current     Types: Chew   Substance and Sexual Activity    Alcohol use: Yes    Drug  use: Never    Sexual activity: Not Currently         Review of Systems   Unable to perform ROS: Intubated   Objective:     Vital Signs (Most Recent):  Temp: 100 °F (37.8 °C) (04/07/23 1003)  Pulse: 94 (04/07/23 1348)  Resp: 18 (04/07/23 1151)  BP: (!) 100/56 (04/07/23 1348)  SpO2: 96 % (04/07/23 1348)   Vital Signs (24h Range):  Temp:  [100 °F (37.8 °C)] 100 °F (37.8 °C)  Pulse:  [] 94  Resp:  [15-22] 18  SpO2:  [89 %-100 %] 96 %  BP: ()/(50-66) 100/56     Weight: 95.3 kg (210 lb)  Body mass index is 26.25 kg/m².      Intake/Output Summary (Last 24 hours) at 4/7/2023 1406  Last data filed at 4/7/2023 1232  Gross per 24 hour   Intake 1000 ml   Output --   Net 1000 ml       Physical Exam  Vitals reviewed.   Constitutional:       Appearance: He is obese.      Interventions: He is sedated and intubated.   HENT:      Right Ear: External ear normal.      Left Ear: External ear normal.      Mouth/Throat:      Mouth: Mucous membranes are moist.      Pharynx: Oropharynx is clear.   Eyes:      Extraocular Movements: Extraocular movements intact.      Conjunctiva/sclera: Conjunctivae normal.   Cardiovascular:      Rate and Rhythm: Normal rate and regular rhythm.      Pulses: Normal pulses.      Heart sounds: Normal heart sounds.   Pulmonary:      Effort: He is intubated.      Breath sounds: Rhonchi present.   Abdominal:      General: Abdomen is flat. Bowel sounds are normal.      Palpations: Abdomen is soft.   Musculoskeletal:         General: Normal range of motion.      Cervical back: Normal range of motion.   Skin:     General: Skin is warm and dry.      Capillary Refill: Capillary refill takes less than 2 seconds.       Vents:  Vent Mode: A/C (04/07/23 1015)  Set Rate: 18 BPM (04/07/23 1015)  Vt Set: 500 mL (04/07/23 1015)  PEEP/CPAP: 5 cmH20 (04/07/23 1015)  Oxygen Concentration (%): 100 (04/07/23 1015)  Total Ve: 11 L/m (04/07/23 1015)    Lines/Drains/Airways       Drain  Duration                  NG/OG Tube  04/07/23 1035 [place] sump 16 Fr. Right mouth <1 day         Urethral Catheter 04/07/23 1000 Non-latex 16 Fr. <1 day              Airway  Duration                  Airway - Non-Surgical 04/07/23 0929 <1 day              Peripheral Intravenous Line  Duration                  Peripheral IV - Single Lumen 04/07/23 0910 18 G Left Antecubital <1 day         Peripheral IV - Single Lumen 04/07/23 0920 18 G Posterior;Right Hand <1 day         Peripheral IV - Single Lumen 04/07/23 1100 20 G Left;Posterior Hand <1 day                    Significant Labs:    CBC/Anemia Profile:  Recent Labs   Lab 04/07/23  1003 04/07/23  1005   WBC 6.16  --    HGB 11.7*  --    HCT 39.2* 41     --    .5*  --    RDW 14.3  --         Chemistries:  Recent Labs   Lab 04/07/23  1003      K 4.9      CO2 39*   BUN 27*   CREATININE 1.63*   CALCIUM 9.6   ALBUMIN 3.1*   PROT 7.4   BILITOT 0.7   ALKPHOS 85   ALT 25   AST 25       All pertinent labs within the past 24 hours have been reviewed.    Significant Imaging:   I have reviewed all pertinent imaging results/findings within the past 24 hours.    Assessment/Plan:     Pulmonary  * Acute exacerbation of chronic obstructive pulmonary disease (COPD)  Intubated prior to arrival   - ABGs on AC RR 18, , peep 5, fio2 50% -- 7.35/61/54  - CT chest without PE- concern for pneumonia in RLL  - steroids, bronchodilators, azithromycin and rocephin   - rest on vent today - start weaning in AM   - CT head negative       COPD (chronic obstructive pulmonary disease)  On multiple inhalers at home - follows with Dr. Alicea in clinic     Cardiac/Vascular  A-fib  New onset- Afib rate 117 on EKG in ER   - likely secondary to pulmonary disease   - PE ruled out   - will check Echo and start wt based Lovenox   - trop WNL -- repeat trop and EKG in 2 hours to r/o MI  - CHADs 2 score - 2         Hypertension  Stable but on lower side  - hold HTN medications for now and resume when needed      Renal/  CKD (chronic kidney disease), stage III  Baseline Cr 1.44   - continue to monitor              NESTOR Holder-ACNP  Pulmonology  [facility] - Emergency Department

## 2023-04-07 NOTE — ED PROVIDER NOTES
Encounter Date: 4/7/2023       History     Chief Complaint   Patient presents with    Shortness of Breath     Patient is a 82-year-old male with history of COPD who is brought by EMS.  EMS states that when they arrived seen patient had agonal respirations and was intubated.  Patient arrives here intubated and unresponsive.  He did receive ketamine prior to arrival.  No further history is available on this patient this time.  Review of outside medical record shows patient does have history of COPD.    Review of patient's allergies indicates:   Allergen Reactions    Sulfa (sulfonamide antibiotics) Hives     Past Medical History:   Diagnosis Date    #[id number]     COPD (chronic obstructive pulmonary disease)     Hypertension     Sarcoma      Past Surgical History:   Procedure Laterality Date    ABDOMINAL AORTIC ANEURYSM REPAIR      BACK SURGERY      FLEXOR TENDON REPAIR Right 11/4/2021    Procedure: REPAIR, TENDON, FLEXOR;  Surgeon: Dani Degroot MD;  Location: [facility];  Service: Orthopedics;  Laterality: Right;    SURGICAL REMOVAL OF SARCOMA      TRIGGER FINGER RELEASE Right 11/4/2021    Procedure: RELEASE, TRIGGER FINGER;  Surgeon: Dani Degroot MD;  Location: [facility];  Service: Orthopedics;  Laterality: Right;    VASCULAR SURGERY       No family history on file.  Social History     Tobacco Use    Smoking status: Former     Packs/day: 1.00     Years: 30.00     Pack years: 30.00     Types: Cigarettes     Passive exposure: Past    Smokeless tobacco: Current     Types: Chew   Substance Use Topics    Alcohol use: Yes    Drug use: Never     Review of Systems   Unable to perform ROS: Intubated     Physical Exam     Initial Vitals   BP Pulse Resp Temp SpO2   04/07/23 1002 04/07/23 0959 04/07/23 0959 04/07/23 1003 04/07/23 0959   (!) 109/54 108 15 100 °F (37.8 °C) 100 %      MAP       --                Physical Exam    Nursing note and vitals reviewed.  Constitutional: He appears well-developed  and well-nourished.   HENT:   Head: Normocephalic and atraumatic.   Mouth/Throat: Oropharynx is clear and moist.   Eyes: Pupils are equal, round, and reactive to light.   Neck: Neck supple.   Normal range of motion.  Cardiovascular:  Normal rate and regular rhythm.           Pulmonary/Chest: Effort normal. He has wheezes.   Abdominal: Abdomen is soft. He exhibits no distension.   Musculoskeletal:         General: Normal range of motion.      Cervical back: Normal range of motion and neck supple.     Neurological:   Patient is intubated with minimal response to pain.  He does not follow commands.   Skin: Skin is warm. Capillary refill takes less than 2 seconds.   Psychiatric: He has a normal mood and affect.       Medical Screening Exam   See Full Note    ED Course   Procedures  Labs Reviewed   COMPREHENSIVE METABOLIC PANEL - Abnormal; Notable for the following components:       Result Value    CO2 39 (*)     Anion Gap 6 (*)     Glucose 121 (*)     BUN 27 (*)     Creatinine 1.63 (*)     Albumin 3.1 (*)     Globulin 4.3 (*)     eGFR 42 (*)     All other components within normal limits   NT-PRO NATRIURETIC PEPTIDE - Abnormal; Notable for the following components:    ProBNP 1,228 (*)     All other components within normal limits   URINALYSIS, REFLEX TO URINE CULTURE - Abnormal; Notable for the following components:    Protein, UA 70 (*)     All other components within normal limits   D DIMER, QUANTITATIVE - Abnormal; Notable for the following components:    D-Dimer 2.32 (*)     All other components within normal limits   CBC WITH DIFFERENTIAL - Abnormal; Notable for the following components:    RBC 3.90 (*)     Hemoglobin 11.7 (*)     Hematocrit 39.2 (*)     .5 (*)     MCHC 29.8 (*)     Neutrophils % 73.3 (*)     Lymphocytes % 9.4 (*)     Monocytes % 13.5 (*)     Immature Granulocytes % 0.8 (*)     Lymphocytes, Absolute 0.58 (*)     Monocytes, Absolute 0.83 (*)     Immature Granulocytes, Absolute 0.05 (*)     All  other components within normal limits   MANUAL DIFFERENTIAL - Abnormal; Notable for the following components:    Segmented Neutrophils, Man % 70 (*)     Lymphocytes, Man % 15 (*)     Monocytes, Man % 11 (*)     Platelet Morphology Few Large Platelets (*)     All other components within normal limits   TROPONIN I - Normal   CULTURE, BLOOD   CULTURE, BLOOD   CBC W/ AUTO DIFFERENTIAL    Narrative:     The following orders were created for panel order CBC auto differential.  Procedure                               Abnormality         Status                     ---------                               -----------         ------                     CBC with Differential[[id number]]        Abnormal            Final result               Manual Differential[[id number]]          Abnormal            Final result                 Please view results for these tests on the individual orders.   LACTIC ACID, PLASMA   SARS-COV-2 RNA AMPLIFICATION, QUAL          Imaging Results              XR Gastric tube check, non-radiologist performed (Final result)  Result time 04/07/23 10:51:26      Final result by José Antonio Deluca II, MD (04/07/23 10:51:26)                   Impression:      NG tube appears within normal limits.      Electronically signed by: José Antonio Deluca  Date:    04/07/2023  Time:    10:51               Narrative:    EXAMINATION:  XR GASTRIC TUBE CHECK, NON-RADIOLOGIST PERFORMED    CLINICAL HISTORY:  ng tube placement;    COMPARISON:  15 January 2017    TECHNIQUE:  XR GASTRIC TUBE CHECK, NON-RADIOLOGIST PERFORMED    FINDINGS:  NG tube present and overlies the stomach.  Remaining findings appears within normal limit.                                       CT Head Without Contrast (Final result)  Result time 04/07/23 10:48:58      Final result by José Antonio Deluca II, MD (04/07/23 10:48:58)                   Impression:      No evidence of acute process or interval change.      Electronically signed by: José Antonio  Yosi  Date:    04/07/2023  Time:    10:48               Narrative:    EXAMINATION:  CT HEAD WITHOUT CONTRAST    CLINICAL HISTORY:  Mental status change, unknown cause;    TECHNIQUE:  Axial CT imaging of the brain is performed without contrast with 3 mm increments.    CT dose reduction technique used - Dose Rite and tube current modulation.    COMPARISON:  24 August 2020    FINDINGS:  No evidence of hemorrhage, mass, mass effect, midline shift or acute infarct seen.  There is moderate diffuse cerebral atrophy similar to previous.  Remaining brain parenchyma attenuation and differentiation appears within normal limits. The ventricles and cisterns are normal in caliber.  No cranial or skull base abnormality is identified.                                       X-Ray Chest AP Portable (Final result)  Result time 04/07/23 10:01:38      Final result by José Antonio Deluca II, MD (04/07/23 10:01:38)                   Impression:      Findings suggest mild cardiac decompensation and / or pneumonitis.      Electronically signed by: José Antonio Deluca  Date:    04/07/2023  Time:    10:01               Narrative:    EXAMINATION:  XR CHEST AP PORTABLE    CLINICAL HISTORY:  CHF;    COMPARISON:  3 January 2023    TECHNIQUE:  XR CHEST AP PORTABLE    FINDINGS:  The heart and mediastinum are stable in size and configuration.  The pulmonary vascularity is slightly increased with bilateral increased interstitial lung density.  No other lung infiltrates, effusions, pneumothorax or other abnormality is demonstrated.                                       Medications   sodium chloride 0.9% bolus 1,000 mL 1,000 mL (1,000 mLs Intravenous New Bag 4/7/23 1000)   methylPREDNISolone sodium succinate injection 125 mg (125 mg Intravenous Given 4/7/23 1000)   albuterol-ipratropium 2.5 mg-0.5 mg/3 mL nebulizer solution 9 mL (9 mLs Nebulization Given 4/7/23 1993)   fentaNYL injection 50 mcg (50 mcg Intravenous Given 4/7/23 1025)                 Attending Attestation:         Attending Critical Care:   Critical Care Times:   Direct Patient Care (initial evaluation, reassessments, and time considering the case)................................................................30 minutes.   Additional History from reviewing old medical records or taking additional history from the family, EMS, PCP, etc.......................5 minutes.   Ordering, Reviewing, and Interpreting Diagnostic Studies...............................................................................................................5 minutes.   Documentation..................................................................................................................................................................................5 minutes.   Consultation with other Physicians. .................................................................................................................................................5 minutes.   Consultation with the patient's family directly relating to the patient's condition, care, and DNR status (when patient unable)......5 minutes.   ==============================================================  Total Critical Care Time - exclusive of procedural time: 55 minutes.  ==============================================================  Critical care was necessary to treat or prevent imminent or life-threatening deterioration of the following conditions: COPD exacerbation.   Critical care was time spent personally by me on the following activities: obtaining history from patient or relative, examination of patient, review of old charts, ordering lab, x-rays, and/or EKG, ordering and performing treatments and interventions, evaluation of patient's response to treatment, discussion with consultants, re-evaluation of patient's conition and ventilator management.   Critical Care Condition: life-threatening         ED Course as of 04/07/23 1101   Fri Apr 07,  2023 0953 Medical decision-making:  Differential diagnosis includes acute COPD exacerbation, CHF exacerbation, stroke, pulmonary embolism, sepsis, UTI, pneumonia.  All labs and imaging ordered and interpreted by me. [BB]   1013 Arterial blood gas shows respiratory acidosis with pCO2 of 83.  Ventilator rate was increased to 18 to help compensate for this. [BB]   1025 D-dimer is elevated at 2.32.  Will obtain CT chest if lab permits.  CBC is normal except mild anemia. [BB]   1041 Pro BNP is elevated at 1200.  Troponin is normal.  BUN is elevated 27, creatinine elevated at 1.6. [BB]   1042 Lactic acid on arterial blood gas is normal at 0.6. [BB]   1043 Chest x-ray shows mild failure versus pneumonia.   [BB]   1052 After patient's family arrived I was able to obtain additional history.  Family states that they have been having trouble getting his oxygen levels to stay up over last few days and he has been needing more breathing treatments.  They called the ambulance this morning because they were not able to get the patient to wake up. [BB]   1052 I made decision to admit patient and discussed case with internal medicine hospitalist on-call, Dr. Dove who agrees with admission.  Patient will be admitted under care of Dr. Zamora.  I did not talk to ICU doctor on-call because he is not covering today.  Hospitalist service is covering for ICU. [BB]   1054 CT brain shows no acute findings. [BB]      ED Course User Index  [BB] Yovanny Rubio MD          Clinical Impression:   Final diagnoses:  [R06.02] Shortness of breath  [J44.1] Acute exacerbation of chronic obstructive pulmonary disease (COPD) (Primary)  [E87.29] Respiratory acidosis  [J96.22] Acute on chronic respiratory failure with hypercapnia        ED Disposition Condition    Admit                 Yovanny Rubio MD  04/07/23 1101

## 2023-04-08 LAB
ANION GAP SERPL CALCULATED.3IONS-SCNC: 13 MMOL/L (ref 7–16)
BASOPHILS # BLD AUTO: 0 K/UL (ref 0–0.2)
BASOPHILS NFR BLD AUTO: 0 % (ref 0–1)
BASOPHILS NFR BLD MANUAL: 1 % (ref 0–1)
BUN SERPL-MCNC: 41 MG/DL (ref 7–18)
BUN/CREAT SERPL: 21 (ref 6–20)
CALCIUM SERPL-MCNC: 9 MG/DL (ref 8.5–10.1)
CHLORIDE SERPL-SCNC: 102 MMOL/L (ref 98–107)
CO2 SERPL-SCNC: 31 MMOL/L (ref 21–32)
CREAT SERPL-MCNC: 1.97 MG/DL (ref 0.7–1.3)
DIFFERENTIAL METHOD BLD: ABNORMAL
EGFR (NO RACE VARIABLE) (RUSH/TITUS): 33 ML/MIN/1.73M²
EOSINOPHIL # BLD AUTO: 0 K/UL (ref 0–0.5)
EOSINOPHIL NFR BLD AUTO: 0 % (ref 1–4)
ERYTHROCYTE [DISTWIDTH] IN BLOOD BY AUTOMATED COUNT: 14.2 % (ref 11.5–14.5)
GLUCOSE SERPL-MCNC: 164 MG/DL (ref 74–106)
HCT VFR BLD AUTO: 35.5 % (ref 40–54)
HGB BLD-MCNC: 11.1 G/DL (ref 13.5–18)
IMM GRANULOCYTES # BLD AUTO: 0.02 K/UL (ref 0–0.04)
IMM GRANULOCYTES NFR BLD: 0.5 % (ref 0–0.4)
LYMPHOCYTES # BLD AUTO: 0.31 K/UL (ref 1–4.8)
LYMPHOCYTES NFR BLD AUTO: 7.3 % (ref 27–41)
LYMPHOCYTES NFR BLD MANUAL: 9 % (ref 27–41)
MCH RBC QN AUTO: 30.6 PG (ref 27–31)
MCHC RBC AUTO-ENTMCNC: 31.3 G/DL (ref 32–36)
MCV RBC AUTO: 97.8 FL (ref 80–96)
MONOCYTES # BLD AUTO: 0.23 K/UL (ref 0–0.8)
MONOCYTES NFR BLD AUTO: 5.4 % (ref 2–6)
MONOCYTES NFR BLD MANUAL: 2 % (ref 2–6)
MPC BLD CALC-MCNC: 10 FL (ref 9.4–12.4)
NEUTROPHILS # BLD AUTO: 3.69 K/UL (ref 1.8–7.7)
NEUTROPHILS NFR BLD AUTO: 86.8 % (ref 53–65)
NEUTS BAND NFR BLD MANUAL: 12 % (ref 1–5)
NEUTS SEG NFR BLD MANUAL: 76 % (ref 50–62)
NRBC # BLD AUTO: 0 X10E3/UL
NRBC, AUTO (.00): 0 %
PLATELET # BLD AUTO: 138 K/UL (ref 150–400)
POTASSIUM SERPL-SCNC: 4.8 MMOL/L (ref 3.5–5.1)
RBC # BLD AUTO: 3.63 M/UL (ref 4.6–6.2)
SODIUM SERPL-SCNC: 141 MMOL/L (ref 136–145)
WBC # BLD AUTO: 4.25 K/UL (ref 4.5–11)

## 2023-04-08 PROCEDURE — 27000221 HC OXYGEN, UP TO 24 HOURS

## 2023-04-08 PROCEDURE — 99291 CRITICAL CARE FIRST HOUR: CPT | Mod: ,,, | Performed by: NURSE PRACTITIONER

## 2023-04-08 PROCEDURE — 85025 COMPLETE CBC W/AUTO DIFF WBC: CPT | Performed by: NURSE PRACTITIONER

## 2023-04-08 PROCEDURE — 25000003 PHARM REV CODE 250

## 2023-04-08 PROCEDURE — 99900026 HC AIRWAY MAINTENANCE (STAT)

## 2023-04-08 PROCEDURE — 25000242 PHARM REV CODE 250 ALT 637 W/ HCPCS: Performed by: NURSE PRACTITIONER

## 2023-04-08 PROCEDURE — 27200966 HC CLOSED SUCTION SYSTEM

## 2023-04-08 PROCEDURE — 25000003 PHARM REV CODE 250: Performed by: NURSE PRACTITIONER

## 2023-04-08 PROCEDURE — 94640 AIRWAY INHALATION TREATMENT: CPT

## 2023-04-08 PROCEDURE — 94761 N-INVAS EAR/PLS OXIMETRY MLT: CPT

## 2023-04-08 PROCEDURE — 99900035 HC TECH TIME PER 15 MIN (STAT)

## 2023-04-08 PROCEDURE — 99291 PR CRITICAL CARE, E/M 30-74 MINUTES: ICD-10-PCS | Mod: ,,, | Performed by: NURSE PRACTITIONER

## 2023-04-08 PROCEDURE — 94003 VENT MGMT INPAT SUBQ DAY: CPT

## 2023-04-08 PROCEDURE — 80048 BASIC METABOLIC PNL TOTAL CA: CPT | Performed by: NURSE PRACTITIONER

## 2023-04-08 PROCEDURE — 20000000 HC ICU ROOM

## 2023-04-08 PROCEDURE — 63600175 PHARM REV CODE 636 W HCPCS: Performed by: NURSE PRACTITIONER

## 2023-04-08 PROCEDURE — 25000242 PHARM REV CODE 250 ALT 637 W/ HCPCS: Performed by: STUDENT IN AN ORGANIZED HEALTH CARE EDUCATION/TRAINING PROGRAM

## 2023-04-08 PROCEDURE — 25000003 PHARM REV CODE 250: Performed by: STUDENT IN AN ORGANIZED HEALTH CARE EDUCATION/TRAINING PROGRAM

## 2023-04-08 RX ADMIN — METHYLPREDNISOLONE SODIUM SUCCINATE 60 MG: 40 INJECTION, POWDER, FOR SOLUTION INTRAMUSCULAR; INTRAVENOUS at 05:04

## 2023-04-08 RX ADMIN — MUPIROCIN: 20 OINTMENT TOPICAL at 08:04

## 2023-04-08 RX ADMIN — DEXTROSE MONOHYDRATE 1 G: 5 INJECTION INTRAVENOUS at 01:04

## 2023-04-08 RX ADMIN — ENOXAPARIN SODIUM 100 MG: 100 INJECTION SUBCUTANEOUS at 07:04

## 2023-04-08 RX ADMIN — ENOXAPARIN SODIUM 100 MG: 100 INJECTION SUBCUTANEOUS at 08:04

## 2023-04-08 RX ADMIN — IPRATROPIUM BROMIDE 0.5 MG: 0.5 SOLUTION RESPIRATORY (INHALATION) at 07:04

## 2023-04-08 RX ADMIN — IPRATROPIUM BROMIDE 0.5 MG: 0.5 SOLUTION RESPIRATORY (INHALATION) at 12:04

## 2023-04-08 RX ADMIN — PROPOFOL 30 MCG/KG/MIN: 10 INJECTION, EMULSION INTRAVENOUS at 05:04

## 2023-04-08 RX ADMIN — PROPOFOL 30 MCG/KG/MIN: 10 INJECTION, EMULSION INTRAVENOUS at 12:04

## 2023-04-08 RX ADMIN — LEVALBUTEROL HYDROCHLORIDE 1.25 MG: 1.25 SOLUTION RESPIRATORY (INHALATION) at 07:04

## 2023-04-08 RX ADMIN — LEVALBUTEROL HYDROCHLORIDE 1.25 MG: 1.25 SOLUTION RESPIRATORY (INHALATION) at 03:04

## 2023-04-08 RX ADMIN — ATORVASTATIN CALCIUM 80 MG: 80 TABLET, FILM COATED ORAL at 08:04

## 2023-04-08 RX ADMIN — SODIUM CHLORIDE 1000 ML: 9 INJECTION, SOLUTION INTRAVENOUS at 01:04

## 2023-04-08 RX ADMIN — SODIUM CHLORIDE 1000 ML: 9 INJECTION, SOLUTION INTRAVENOUS at 07:04

## 2023-04-08 RX ADMIN — BUDESONIDE 0.5 MG: 0.5 INHALANT ORAL at 07:04

## 2023-04-08 RX ADMIN — METHYLPREDNISOLONE SODIUM SUCCINATE 60 MG: 40 INJECTION, POWDER, FOR SOLUTION INTRAMUSCULAR; INTRAVENOUS at 11:04

## 2023-04-08 RX ADMIN — MUPIROCIN: 20 OINTMENT TOPICAL at 09:04

## 2023-04-08 RX ADMIN — LEVALBUTEROL HYDROCHLORIDE 1.25 MG: 1.25 SOLUTION RESPIRATORY (INHALATION) at 11:04

## 2023-04-08 RX ADMIN — AZITHROMYCIN DIHYDRATE 500 MG: 500 INJECTION, POWDER, LYOPHILIZED, FOR SOLUTION INTRAVENOUS at 01:04

## 2023-04-08 RX ADMIN — ASPIRIN 81 MG: 81 TABLET, DELAYED RELEASE ORAL at 08:04

## 2023-04-08 NOTE — ASSESSMENT & PLAN NOTE
Intubated prior to arrival   - ABGs on AC RR 18, , peep 5, fio2 50% -- 7.35/61/54  - CT chest without PE- concern for pneumonia in RLL  - steroids, bronchodilators, azithromycin and rocephin   - CT head negative     -- started CPAP today

## 2023-04-08 NOTE — PLAN OF CARE
Problem: Fall Injury Risk  Goal: Absence of Fall and Fall-Related Injury  Outcome: Ongoing, Progressing  Intervention: Identify and Manage Contributors  Flowsheets (Taken 4/7/2023 2126)  Medication Review/Management: medications reviewed  Intervention: Promote Injury-Free Environment  Flowsheets (Taken 4/7/2023 2126)  Safety Promotion/Fall Prevention:   bed alarm set   Fall Risk signage in place   Fall Risk reviewed with patient/family   pulse ox   side rails raised x 3   room near unit station     Problem: Communication Impairment (Mechanical Ventilation, Invasive)  Goal: Effective Communication  Outcome: Ongoing, Progressing  Intervention: Ensure Effective Communication  Flowsheets (Taken 4/7/2023 2126)  Communication Enhancement Strategies:   verbal and visual cues paired   repetition utilized   nonverbal strategies used

## 2023-04-08 NOTE — ASSESSMENT & PLAN NOTE
Stable but on lower side  - hold HTN medications for now and resume when needed   - increaed Cr today , BP soft- will add IVF bolus and see how he responds

## 2023-04-08 NOTE — PROGRESS NOTES
[facility]  Pulmonology  Progress Note    Patient Name: Nathan Hinton  MRN: [id number]  Admission Date: 4/7/2023  Hospital Length of Stay: 1 days  Code Status: Prior  Attending Provider: Cleveland Benitez DO  Primary Care Provider: Jayant Castro MD   Principal Problem: Acute exacerbation of chronic obstructive pulmonary disease (COPD)    Subjective:     Interval History: intubated, sedated       Objective:     Vital Signs (Most Recent):  Temp: 97.8 °F (36.6 °C) (04/08/23 0700)  Pulse: 91 (04/08/23 1315)  Resp: 18 (04/08/23 1315)  BP: (!) 84/51 (04/08/23 1300)  SpO2: 100 % (04/08/23 1315)   Vital Signs (24h Range):  Temp:  [97.8 °F (36.6 °C)-99.8 °F (37.7 °C)] 97.8 °F (36.6 °C)  Pulse:  [] 91  Resp:  [15-23] 18  SpO2:  [95 %-100 %] 100 %  BP: ()/(47-78) 84/51     Weight: 96.6 kg (213 lb)  Body mass index is 26.62 kg/m².      Intake/Output Summary (Last 24 hours) at 4/8/2023 1337  Last data filed at 4/8/2023 0942  Gross per 24 hour   Intake 3091.08 ml   Output 1225 ml   Net 1866.08 ml       Physical Exam  Vitals reviewed.   Constitutional:       Appearance: He is obese.      Interventions: He is sedated and intubated.   HENT:      Right Ear: External ear normal.      Left Ear: External ear normal.      Mouth/Throat:      Mouth: Mucous membranes are moist.      Pharynx: Oropharynx is clear.   Eyes:      Extraocular Movements: Extraocular movements intact.      Conjunctiva/sclera: Conjunctivae normal.   Cardiovascular:      Rate and Rhythm: Normal rate and regular rhythm.      Pulses: Normal pulses.      Heart sounds: Normal heart sounds.   Pulmonary:      Effort: Pulmonary effort is normal. He is intubated.   Abdominal:      General: Abdomen is flat. Bowel sounds are normal.      Palpations: Abdomen is soft.   Musculoskeletal:         General: Normal range of motion.      Cervical back: Normal range of motion.   Skin:     General: Skin is warm and dry.      Capillary Refill: Capillary  refill takes less than 2 seconds.     Review of Systems    Vents:  Vent Mode: A/C (04/08/23 0737)  Set Rate: 18 BPM (04/08/23 0737)  Vt Set: 500 mL (04/08/23 0737)  PEEP/CPAP: 5 cmH20 (04/08/23 0737)  Oxygen Concentration (%): 50 (04/08/23 0737)  Peak Airway Pressure: 26 cmH20 (04/08/23 0737)  Total Ve: 10 L/m (04/08/23 0737)  F/VT Ratio<105 (RSBI): (!) 42.37 (04/08/23 0737)    Lines/Drains/Airways       Drain  Duration                  NG/OG Tube 04/07/23 1035 [place] [facility] sump 16 Fr. Right mouth 1 day         Urethral Catheter 04/07/23 1000 Non-latex 16 Fr. 1 day              Airway  Duration                  Airway - Non-Surgical 04/07/23 0929 1 day              Peripheral Intravenous Line  Duration                  Peripheral IV - Single Lumen 04/07/23 0910 18 G Left Antecubital 1 day         Peripheral IV - Single Lumen 04/07/23 1100 20 G Left;Posterior Hand 1 day         Peripheral IV - Single Lumen 04/07/23 1700 20 G Posterior;Right Hand <1 day                    Significant Labs:    CBC/Anemia Profile:  Recent Labs   Lab 04/07/23  1003 04/07/23  1005 04/07/23  1320 04/07/23  1324 04/08/23  0308   WBC 6.16  --   --   --  4.25*   HGB 11.7*  --   --   --  11.1*   HCT 39.2*   < > 38 38 35.5*     --   --   --  138*   .5*  --   --   --  97.8*   RDW 14.3  --   --   --  14.2    < > = values in this interval not displayed.        Chemistries:  Recent Labs   Lab 04/07/23  1003 04/08/23  0308    141   K 4.9 4.8    102   CO2 39* 31   BUN 27* 41*   CREATININE 1.63* 1.97*   CALCIUM 9.6 9.0   ALBUMIN 3.1*  --    PROT 7.4  --    BILITOT 0.7  --    ALKPHOS 85  --    ALT 25  --    AST 25  --        All pertinent labs within the past 24 hours have been reviewed.    Significant Imaging:  I have reviewed all pertinent imaging results/findings within the past 24 hours.    Assessment/Plan:     Pulmonary  * Acute exacerbation of chronic obstructive pulmonary disease (COPD)  Intubated prior to arrival   - ABGs on  AC RR 18, , peep 5, fio2 50% -- 7.35/61/54  - CT chest without PE- concern for pneumonia in RLL  - steroids, bronchodilators, azithromycin and rocephin   - CT head negative     -- started CPAP today       Cardiac/Vascular  A-fib  New onset- Afib rate 117 on EKG in ER   - likely secondary to pulmonary disease   - PE ruled out   - will check Echo and start wt based Lovenox   - trop WNL -- repeat trop and EKG in 2 hours to r/o MI  - CHADs 2 score - 2         Hypertension  Stable but on lower side  - hold HTN medications for now and resume when needed   - increaed Cr today , BP soft- will add IVF bolus and see how he responds     Renal/  CKD (chronic kidney disease), stage III  Baseline Cr 1.44     Trended up to 1.97 today - likely volume contraction - will treat with IVF bolus and repeat in am    This includes 32 minutes of critical care time spent evaluating and managing patient. This includes time spent at bedside, reviewing labs, data, xrays and monitoring for acute decompensation.                Quita Vuong AG-ACNP  Pulmonology  [facility]

## 2023-04-08 NOTE — RESPIRATORY THERAPY
15:25 - Patient placed on CPAP 10/5, 50% at this time. Tolerating well.    17:55 - Patient completed CPAP trial from 15:25-17:55 with no complications. Respiratory rate did increase, but no distress was seen. Back on A/C at this time.

## 2023-04-08 NOTE — PLAN OF CARE
Problem: Infection  Goal: Absence of Infection Signs and Symptoms  Intervention: Prevent or Manage Infection  Flowsheets (Taken 4/8/2023 1640)  Isolation Precautions: precautions maintained     Problem: Fall Injury Risk  Goal: Absence of Fall and Fall-Related Injury  Intervention: Promote Injury-Free Environment  Flowsheets (Taken 4/8/2023 1640)  Safety Promotion/Fall Prevention: assistive device/personal item within reach     Problem: Restraint, Nonbehavioral (Nonviolent)  Goal: Absence of Harm or Injury  Intervention: Restraint Injuries Monitor Q2 hours  Flowsheets (Taken 4/8/2023 1640)  Injuries Noted: None  Treatment:   Relocate restraint   Skin care

## 2023-04-08 NOTE — ASSESSMENT & PLAN NOTE
Baseline Cr 1.44     Trended up to 1.97 today - likely volume contraction - will treat with IVF bolus and repeat in am

## 2023-04-09 LAB
ANION GAP SERPL CALCULATED.3IONS-SCNC: 11 MMOL/L (ref 7–16)
AORTIC VALVE CUSP SEPERATION: 1.94 CM
AV INDEX (PROSTH): 0.74
AV MEAN GRADIENT: 2 MMHG
AV PEAK GRADIENT: 5 MMHG
AV VALVE AREA: 2.31 CM2
AV VELOCITY RATIO: 0.64
BASOPHILS # BLD AUTO: 0 K/UL (ref 0–0.2)
BASOPHILS NFR BLD AUTO: 0 % (ref 0–1)
BSA FOR ECHO PROCEDURE: 2.26 M2
BUN SERPL-MCNC: 45 MG/DL (ref 7–18)
BUN/CREAT SERPL: 25 (ref 6–20)
CALCIUM SERPL-MCNC: 9 MG/DL (ref 8.5–10.1)
CHLORIDE SERPL-SCNC: 103 MMOL/L (ref 98–107)
CO2 SERPL-SCNC: 31 MMOL/L (ref 21–32)
CREAT SERPL-MCNC: 1.78 MG/DL (ref 0.7–1.3)
CV ECHO LV RWT: 0.37 CM
DIFFERENTIAL METHOD BLD: ABNORMAL
DOP CALC AO PEAK VEL: 1.1 M/S
DOP CALC AO VTI: 17.52 CM
DOP CALC LVOT AREA: 3.1 CM2
DOP CALC LVOT DIAMETER: 2 CM
DOP CALC LVOT PEAK VEL: 0.7 M/S
DOP CALC LVOT STROKE VOLUME: 40.44 CM3
DOP CALCLVOT PEAK VEL VTI: 12.88 CM
ECHO LV POSTERIOR WALL: 0.81 CM (ref 0.6–1.1)
EGFR (NO RACE VARIABLE) (RUSH/TITUS): 37 ML/MIN/1.73M²
EJECTION FRACTION: 38 %
EOSINOPHIL # BLD AUTO: 0 K/UL (ref 0–0.5)
EOSINOPHIL NFR BLD AUTO: 0 % (ref 1–4)
ERYTHROCYTE [DISTWIDTH] IN BLOOD BY AUTOMATED COUNT: 14.2 % (ref 11.5–14.5)
FRACTIONAL SHORTENING: 24 % (ref 28–44)
GLUCOSE SERPL-MCNC: 142 MG/DL (ref 74–106)
HCO3 UR-SCNC: 32.8 MMOL/L (ref 21–28)
HCT VFR BLD AUTO: 34.5 % (ref 40–54)
HGB BLD-MCNC: 10.8 G/DL (ref 13.5–18)
IMM GRANULOCYTES # BLD AUTO: 0.03 K/UL (ref 0–0.04)
IMM GRANULOCYTES NFR BLD: 0.5 % (ref 0–0.4)
INTERVENTRICULAR SEPTUM: 0.73 CM (ref 0.6–1.1)
LEFT ATRIUM SIZE: 3.95 CM
LEFT INTERNAL DIMENSION IN SYSTOLE: 3.28 CM (ref 2.1–4)
LEFT VENTRICLE DIASTOLIC VOLUME INDEX: 37.73 ML/M2
LEFT VENTRICLE DIASTOLIC VOLUME: 84.9 ML
LEFT VENTRICLE MASS INDEX: 45 G/M2
LEFT VENTRICLE SYSTOLIC VOLUME INDEX: 19.3 ML/M2
LEFT VENTRICLE SYSTOLIC VOLUME: 43.49 ML
LEFT VENTRICULAR INTERNAL DIMENSION IN DIASTOLE: 4.34 CM (ref 3.5–6)
LEFT VENTRICULAR MASS: 101.73 G
LVOT MG: 0.9 MMHG
LYMPHOCYTES # BLD AUTO: 0.45 K/UL (ref 1–4.8)
LYMPHOCYTES NFR BLD AUTO: 7.4 % (ref 27–41)
LYMPHOCYTES NFR BLD MANUAL: 3 % (ref 27–41)
MCH RBC QN AUTO: 29.9 PG (ref 27–31)
MCHC RBC AUTO-ENTMCNC: 31.3 G/DL (ref 32–36)
MCV RBC AUTO: 95.6 FL (ref 80–96)
MONOCYTES # BLD AUTO: 0.43 K/UL (ref 0–0.8)
MONOCYTES NFR BLD AUTO: 7.1 % (ref 2–6)
MONOCYTES NFR BLD MANUAL: 3 % (ref 2–6)
MPC BLD CALC-MCNC: 10.2 FL (ref 9.4–12.4)
NEUTROPHILS # BLD AUTO: 5.17 K/UL (ref 1.8–7.7)
NEUTROPHILS NFR BLD AUTO: 85 % (ref 53–65)
NEUTS BAND NFR BLD MANUAL: 4 % (ref 1–5)
NEUTS SEG NFR BLD MANUAL: 90 % (ref 50–62)
NRBC # BLD AUTO: 0 X10E3/UL
NRBC, AUTO (.00): 0 %
PCO2 BLDA: 53 MMHG (ref 35–48)
PH SMN: 7.4 [PH] (ref 7.35–7.45)
PISA TR MAX VEL: 2.22 M/S
PLATELET # BLD AUTO: 161 K/UL (ref 150–400)
PLATELET MORPHOLOGY: NORMAL
PO2 BLDA: 73 MMHG (ref 83–108)
POC BASE EXCESS: 6.6 MMOL/L (ref -2–3)
POC SATURATED O2: 94 % (ref 95–98)
POTASSIUM SERPL-SCNC: 4.4 MMOL/L (ref 3.5–5.1)
RA PRESSURE: 8 MMHG
RBC # BLD AUTO: 3.61 M/UL (ref 4.6–6.2)
RBC MORPH BLD: NORMAL
RIGHT ATRIAL AREA: 18.95 CM2
RIGHT VENTRICULAR END-DIASTOLIC DIMENSION: 3.5 CM
SODIUM SERPL-SCNC: 141 MMOL/L (ref 136–145)
TR MAX PG: 20 MMHG
TRICUSPID ANNULAR PLANE SYSTOLIC EXCURSION: 1.3 CM
TV REST PULMONARY ARTERY PRESSURE: 28 MMHG
WBC # BLD AUTO: 6.08 K/UL (ref 4.5–11)

## 2023-04-09 PROCEDURE — 99900026 HC AIRWAY MAINTENANCE (STAT)

## 2023-04-09 PROCEDURE — 51702 INSERT TEMP BLADDER CATH: CPT

## 2023-04-09 PROCEDURE — 27000190 HC CPAP FULL FACE MASK W/VALVE

## 2023-04-09 PROCEDURE — 94761 N-INVAS EAR/PLS OXIMETRY MLT: CPT

## 2023-04-09 PROCEDURE — 99900035 HC TECH TIME PER 15 MIN (STAT)

## 2023-04-09 PROCEDURE — 25000003 PHARM REV CODE 250: Performed by: NURSE PRACTITIONER

## 2023-04-09 PROCEDURE — 20000000 HC ICU ROOM

## 2023-04-09 PROCEDURE — 82803 BLOOD GASES ANY COMBINATION: CPT

## 2023-04-09 PROCEDURE — 84295 ASSAY OF SERUM SODIUM: CPT

## 2023-04-09 PROCEDURE — 99291 PR CRITICAL CARE, E/M 30-74 MINUTES: ICD-10-PCS | Mod: ,,, | Performed by: NURSE PRACTITIONER

## 2023-04-09 PROCEDURE — 83605 ASSAY OF LACTIC ACID: CPT

## 2023-04-09 PROCEDURE — 80048 BASIC METABOLIC PNL TOTAL CA: CPT | Performed by: NURSE PRACTITIONER

## 2023-04-09 PROCEDURE — 82330 ASSAY OF CALCIUM: CPT

## 2023-04-09 PROCEDURE — 27000221 HC OXYGEN, UP TO 24 HOURS

## 2023-04-09 PROCEDURE — 94003 VENT MGMT INPAT SUBQ DAY: CPT

## 2023-04-09 PROCEDURE — 84132 ASSAY OF SERUM POTASSIUM: CPT

## 2023-04-09 PROCEDURE — 36600 WITHDRAWAL OF ARTERIAL BLOOD: CPT

## 2023-04-09 PROCEDURE — 99291 CRITICAL CARE FIRST HOUR: CPT | Mod: ,,, | Performed by: NURSE PRACTITIONER

## 2023-04-09 PROCEDURE — 82947 ASSAY GLUCOSE BLOOD QUANT: CPT

## 2023-04-09 PROCEDURE — 63600175 PHARM REV CODE 636 W HCPCS: Performed by: NURSE PRACTITIONER

## 2023-04-09 PROCEDURE — 85014 HEMATOCRIT: CPT

## 2023-04-09 PROCEDURE — 85025 COMPLETE CBC W/AUTO DIFF WBC: CPT | Performed by: NURSE PRACTITIONER

## 2023-04-09 PROCEDURE — 94640 AIRWAY INHALATION TREATMENT: CPT

## 2023-04-09 PROCEDURE — 25000242 PHARM REV CODE 250 ALT 637 W/ HCPCS: Performed by: STUDENT IN AN ORGANIZED HEALTH CARE EDUCATION/TRAINING PROGRAM

## 2023-04-09 PROCEDURE — 25000242 PHARM REV CODE 250 ALT 637 W/ HCPCS: Performed by: NURSE PRACTITIONER

## 2023-04-09 RX ORDER — ETOMIDATE 2 MG/ML
INJECTION INTRAVENOUS
Status: DISPENSED
Start: 2023-04-09 | End: 2023-04-10

## 2023-04-09 RX ORDER — ROCURONIUM BROMIDE 10 MG/ML
INJECTION, SOLUTION INTRAVENOUS
Status: DISPENSED
Start: 2023-04-09 | End: 2023-04-10

## 2023-04-09 RX ADMIN — LEVALBUTEROL HYDROCHLORIDE 1.25 MG: 1.25 SOLUTION RESPIRATORY (INHALATION) at 03:04

## 2023-04-09 RX ADMIN — APIXABAN 2.5 MG: 2.5 TABLET, FILM COATED ORAL at 08:04

## 2023-04-09 RX ADMIN — IPRATROPIUM BROMIDE 0.5 MG: 0.5 SOLUTION RESPIRATORY (INHALATION) at 01:04

## 2023-04-09 RX ADMIN — IPRATROPIUM BROMIDE 0.5 MG: 0.5 SOLUTION RESPIRATORY (INHALATION) at 07:04

## 2023-04-09 RX ADMIN — IPRATROPIUM BROMIDE 0.5 MG: 0.5 SOLUTION RESPIRATORY (INHALATION) at 12:04

## 2023-04-09 RX ADMIN — DEXTROSE MONOHYDRATE 1 G: 5 INJECTION INTRAVENOUS at 01:04

## 2023-04-09 RX ADMIN — MUPIROCIN: 20 OINTMENT TOPICAL at 09:04

## 2023-04-09 RX ADMIN — AZITHROMYCIN DIHYDRATE 500 MG: 500 INJECTION, POWDER, LYOPHILIZED, FOR SOLUTION INTRAVENOUS at 01:04

## 2023-04-09 RX ADMIN — PROPOFOL 30 MCG/KG/MIN: 10 INJECTION, EMULSION INTRAVENOUS at 09:04

## 2023-04-09 RX ADMIN — ASPIRIN 81 MG: 81 TABLET, DELAYED RELEASE ORAL at 08:04

## 2023-04-09 RX ADMIN — ENOXAPARIN SODIUM 100 MG: 100 INJECTION SUBCUTANEOUS at 06:04

## 2023-04-09 RX ADMIN — PROPOFOL 30 MCG/KG/MIN: 10 INJECTION, EMULSION INTRAVENOUS at 04:04

## 2023-04-09 RX ADMIN — METHYLPREDNISOLONE SODIUM SUCCINATE 60 MG: 40 INJECTION, POWDER, FOR SOLUTION INTRAMUSCULAR; INTRAVENOUS at 11:04

## 2023-04-09 RX ADMIN — PROPOFOL 30 MCG/KG/MIN: 10 INJECTION, EMULSION INTRAVENOUS at 12:04

## 2023-04-09 RX ADMIN — BUDESONIDE 0.5 MG: 0.5 INHALANT ORAL at 07:04

## 2023-04-09 RX ADMIN — MUPIROCIN: 20 OINTMENT TOPICAL at 08:04

## 2023-04-09 RX ADMIN — ATORVASTATIN CALCIUM 80 MG: 80 TABLET, FILM COATED ORAL at 08:04

## 2023-04-09 RX ADMIN — LEVALBUTEROL HYDROCHLORIDE 1.25 MG: 1.25 SOLUTION RESPIRATORY (INHALATION) at 07:04

## 2023-04-09 RX ADMIN — METHYLPREDNISOLONE SODIUM SUCCINATE 60 MG: 40 INJECTION, POWDER, FOR SOLUTION INTRAMUSCULAR; INTRAVENOUS at 05:04

## 2023-04-09 NOTE — ASSESSMENT & PLAN NOTE
Intubated prior to arrival  -  On home O2   - ABGs on AC RR 18, , peep 5, fio2 50% -- 7.35/61/54  - CT chest without PE- concern for pneumonia in RLL  - steroids, bronchodilators, azithromycin and rocephin   - CT head negative   4/9-- placed on SBT this am - did well, ABGs reviewed - plan to extubate to NC today and use bipap at night

## 2023-04-09 NOTE — PROGRESS NOTES
[facility]  Pulmonology  Progress Note    Patient Name: Nathan Hinton  MRN: [id number]  Admission Date: 4/7/2023  Hospital Length of Stay: 2 days  Code Status: Prior  Attending Provider: Cleveland Benitez DO  Primary Care Provider: Jayant Castro MD   Principal Problem: Acute exacerbation of chronic obstructive pulmonary disease (COPD)    Subjective:     Interval History: seen on CPAP - awake and following commands       Objective:     Vital Signs (Most Recent):  Temp: 98.2 °F (36.8 °C) (04/09/23 1215)  Pulse: 86 (04/09/23 1305)  Resp: (!) 21 (04/09/23 1305)  BP: 116/76 (04/09/23 1230)  SpO2: (!) 89 % (04/09/23 1305)   Vital Signs (24h Range):  Temp:  [98.2 °F (36.8 °C)-98.9 °F (37.2 °C)] 98.2 °F (36.8 °C)  Pulse:  [] 86  Resp:  [17-29] 21  SpO2:  [87 %-100 %] 89 %  BP: ()/(49-77) 116/76     Weight: 96.2 kg (212 lb 1.3 oz)  Body mass index is 26.51 kg/m².      Intake/Output Summary (Last 24 hours) at 4/9/2023 1420  Last data filed at 4/9/2023 1114  Gross per 24 hour   Intake 2741.4 ml   Output 1050 ml   Net 1691.4 ml       Physical Exam  Vitals reviewed.   Constitutional:       Appearance: He is obese.      Interventions: He is sedated and intubated.   HENT:      Right Ear: External ear normal.      Left Ear: External ear normal.      Mouth/Throat:      Mouth: Mucous membranes are moist.      Pharynx: Oropharynx is clear.   Eyes:      Extraocular Movements: Extraocular movements intact.      Conjunctiva/sclera: Conjunctivae normal.   Cardiovascular:      Rate and Rhythm: Normal rate and regular rhythm.      Pulses: Normal pulses.      Heart sounds: Normal heart sounds.   Pulmonary:      Effort: Pulmonary effort is normal. He is intubated.   Abdominal:      General: Abdomen is flat. Bowel sounds are normal.      Palpations: Abdomen is soft.   Musculoskeletal:         General: Normal range of motion.      Cervical back: Normal range of motion.   Skin:     General: Skin is warm and dry.       Capillary Refill: Capillary refill takes less than 2 seconds.     Review of Systems    Vents:  Vent Mode: A/C (04/09/23 0815)  Set Rate: 18 BPM (04/09/23 0815)  Vt Set: 500 mL (04/09/23 0815)  Pressure Support: 10 cmH20 (04/08/23 1525)  PEEP/CPAP: 5 cmH20 (04/09/23 0815)  Oxygen Concentration (%): 36 (04/09/23 1305)  Peak Airway Pressure: 31 cmH20 (04/09/23 0815)  Total Ve: 10.6 L/m (04/09/23 0815)  F/VT Ratio<105 (RSBI): (!) 36.47 (04/09/23 0815)    Lines/Drains/Airways       Drain  Duration                  NG/OG Tube 04/07/23 1035 [place] sump 16 Fr. Right mouth 2 days         Urethral Catheter 04/07/23 1000 Non-latex 16 Fr. 2 days              Airway  Duration                  Airway - Non-Surgical 04/07/23 0929 2 days              Peripheral Intravenous Line  Duration                  Peripheral IV - Single Lumen 04/07/23 0910 18 G Left Antecubital 2 days         Peripheral IV - Single Lumen 04/07/23 1100 20 G Left;Posterior Hand 2 days         Peripheral IV - Single Lumen 04/07/23 1700 20 G Posterior;Right Hand 1 day                    Significant Labs:    CBC/Anemia Profile:  Recent Labs   Lab 04/08/23  0308 04/09/23  0533   WBC 4.25* 6.08   HGB 11.1* 10.8*   HCT 35.5* 34.5*   * 161   MCV 97.8* 95.6   RDW 14.2 14.2        Chemistries:  Recent Labs   Lab 04/08/23  0308 04/09/23  0533    141   K 4.8 4.4    103   CO2 31 31   BUN 41* 45*   CREATININE 1.97* 1.78*   CALCIUM 9.0 9.0       All pertinent labs within the past 24 hours have been reviewed.    Significant Imaging:  I have reviewed all pertinent imaging results/findings within the past 24 hours.    Assessment/Plan:     Pulmonary  * Acute exacerbation of chronic obstructive pulmonary disease (COPD)  Intubated prior to arrival  -  On home O2   - ABGs on AC RR 18, , peep 5, fio2 50% -- 7.35/61/54  - CT chest without PE- concern for pneumonia in RLL  - steroids, bronchodilators, azithromycin and rocephin   - CT head negative   4/9--  placed on SBT this am - did well, ABGs reviewed - plan to extubate to NC today and use bipap at night       COPD (chronic obstructive pulmonary disease)  On multiple inhalers at home - follows with Dr. Alicea in clinic     Cardiac/Vascular  A-fib  New onset- Afib rate 117 on EKG in ER   - likely secondary to pulmonary disease   - PE ruled out   - MI ruled out   - will check Echo and start wt based Lovenox   - CHADs 2 score - 2   4/9-- Echo still pending - rate controlled - will transition to Eliquis today - recommended dose 2.5mg BID due to age > 80 and Cr >1.5         Hypertension  Stable but on lower side  - hold HTN medications for now and resume when needed         This includes 32 minutes of critical care time spent evaluating and managing patient. This includes time spent at bedside, reviewing labs, data, xrays and monitoring for acute decompensation.              Quita Vuong, AG-ACNP  Pulmonology  [facility]

## 2023-04-09 NOTE — RESPIRATORY THERAPY
PLACED ON CPAP WITH 5 PEEP AND 12 PRESSURE SUPPORT @ 09:05 - 11:20 , THEN EXTUBATED TO 3 LPM CANNULA WITH NOT COMPLICATIONS ,1ST TRIAL DONE TODAY

## 2023-04-09 NOTE — ASSESSMENT & PLAN NOTE
New onset- Afib rate 117 on EKG in ER   - likely secondary to pulmonary disease   - PE ruled out   - MI ruled out   - will check Echo and start wt based Lovenox   - CHADs 2 score - 2   4/9-- Echo still pending - rate controlled - will transition to Eliquis today - recommended dose 2.5mg BID due to age > 80 and Cr >1.5

## 2023-04-09 NOTE — PLAN OF CARE
Problem: Infection  Goal: Absence of Infection Signs and Symptoms  Outcome: Ongoing, Progressing  Intervention: Prevent or Manage Infection  Flowsheets (Taken 4/8/2023 2100)  Infection Management: aseptic technique maintained  Isolation Precautions: precautions maintained     Problem: Restraint, Nonbehavioral (Nonviolent)  Goal: Absence of Harm or Injury  Outcome: Ongoing, Progressing  Intervention: Implement Least Restrictive Safety Strategies  Flowsheets (Taken 4/8/2023 2122)  Medical Device Protection: tubing secured  Less Restrictive Alternative: calming techniques promoted  Diversional Activities: television  De-Escalation Techniques:   stimulation decreased   reoriented     Problem: Ventilator-Induced Lung Injury (Mechanical Ventilation, Invasive)  Goal: Absence of Ventilator-Induced Lung Injury  Outcome: Ongoing, Progressing

## 2023-04-09 NOTE — SUBJECTIVE & OBJECTIVE
Interval History: seen on CPAP - awake and following commands       Objective:     Vital Signs (Most Recent):  Temp: 98.2 °F (36.8 °C) (04/09/23 1215)  Pulse: 86 (04/09/23 1305)  Resp: (!) 21 (04/09/23 1305)  BP: 116/76 (04/09/23 1230)  SpO2: (!) 89 % (04/09/23 1305)   Vital Signs (24h Range):  Temp:  [98.2 °F (36.8 °C)-98.9 °F (37.2 °C)] 98.2 °F (36.8 °C)  Pulse:  [] 86  Resp:  [17-29] 21  SpO2:  [87 %-100 %] 89 %  BP: ()/(49-77) 116/76     Weight: 96.2 kg (212 lb 1.3 oz)  Body mass index is 26.51 kg/m².      Intake/Output Summary (Last 24 hours) at 4/9/2023 1420  Last data filed at 4/9/2023 1114  Gross per 24 hour   Intake 2741.4 ml   Output 1050 ml   Net 1691.4 ml       Physical Exam  Vitals reviewed.   Constitutional:       Appearance: He is obese.      Interventions: He is sedated and intubated.   HENT:      Right Ear: External ear normal.      Left Ear: External ear normal.      Mouth/Throat:      Mouth: Mucous membranes are moist.      Pharynx: Oropharynx is clear.   Eyes:      Extraocular Movements: Extraocular movements intact.      Conjunctiva/sclera: Conjunctivae normal.   Cardiovascular:      Rate and Rhythm: Normal rate and regular rhythm.      Pulses: Normal pulses.      Heart sounds: Normal heart sounds.   Pulmonary:      Effort: Pulmonary effort is normal. He is intubated.   Abdominal:      General: Abdomen is flat. Bowel sounds are normal.      Palpations: Abdomen is soft.   Musculoskeletal:         General: Normal range of motion.      Cervical back: Normal range of motion.   Skin:     General: Skin is warm and dry.      Capillary Refill: Capillary refill takes less than 2 seconds.     Review of Systems    Vents:  Vent Mode: A/C (04/09/23 0815)  Set Rate: 18 BPM (04/09/23 0815)  Vt Set: 500 mL (04/09/23 0815)  Pressure Support: 10 cmH20 (04/08/23 1525)  PEEP/CPAP: 5 cmH20 (04/09/23 0815)  Oxygen Concentration (%): 36 (04/09/23 1305)  Peak Airway Pressure: 31 cmH20 (04/09/23 0815)  Total  Ve: 10.6 L/m (04/09/23 0815)  F/VT Ratio<105 (RSBI): (!) 36.47 (04/09/23 0815)    Lines/Drains/Airways       Drain  Duration                  NG/OG Tube 04/07/23 1035 [place] sump 16 Fr. Right mouth 2 days         Urethral Catheter 04/07/23 1000 Non-latex 16 Fr. 2 days              Airway  Duration                  Airway - Non-Surgical 04/07/23 0929 2 days              Peripheral Intravenous Line  Duration                  Peripheral IV - Single Lumen 04/07/23 0910 18 G Left Antecubital 2 days         Peripheral IV - Single Lumen 04/07/23 1100 20 G Left;Posterior Hand 2 days         Peripheral IV - Single Lumen 04/07/23 1700 20 G Posterior;Right Hand 1 day                    Significant Labs:    CBC/Anemia Profile:  Recent Labs   Lab 04/08/23 0308 04/09/23  0533   WBC 4.25* 6.08   HGB 11.1* 10.8*   HCT 35.5* 34.5*   * 161   MCV 97.8* 95.6   RDW 14.2 14.2        Chemistries:  Recent Labs   Lab 04/08/23 0308 04/09/23  0533    141   K 4.8 4.4    103   CO2 31 31   BUN 41* 45*   CREATININE 1.97* 1.78*   CALCIUM 9.0 9.0       All pertinent labs within the past 24 hours have been reviewed.    Significant Imaging:  I have reviewed all pertinent imaging results/findings within the past 24 hours.

## 2023-04-10 LAB
ANION GAP SERPL CALCULATED.3IONS-SCNC: 11 MMOL/L (ref 7–16)
BASOPHILS # BLD AUTO: 0 K/UL (ref 0–0.2)
BASOPHILS NFR BLD AUTO: 0 % (ref 0–1)
BUN SERPL-MCNC: 43 MG/DL (ref 7–18)
BUN/CREAT SERPL: 33 (ref 6–20)
CALCIUM SERPL-MCNC: 8.8 MG/DL (ref 8.5–10.1)
CHLORIDE SERPL-SCNC: 105 MMOL/L (ref 98–107)
CO2 SERPL-SCNC: 31 MMOL/L (ref 21–32)
CREAT SERPL-MCNC: 1.32 MG/DL (ref 0.7–1.3)
DIFFERENTIAL METHOD BLD: ABNORMAL
EGFR (NO RACE VARIABLE) (RUSH/TITUS): 54 ML/MIN/1.73M²
EOSINOPHIL # BLD AUTO: 0 K/UL (ref 0–0.5)
EOSINOPHIL NFR BLD AUTO: 0 % (ref 1–4)
ERYTHROCYTE [DISTWIDTH] IN BLOOD BY AUTOMATED COUNT: 14.2 % (ref 11.5–14.5)
GLUCOSE SERPL-MCNC: 129 MG/DL (ref 74–106)
HCT VFR BLD AUTO: 35.3 % (ref 40–54)
HGB BLD-MCNC: 11.2 G/DL (ref 13.5–18)
IMM GRANULOCYTES # BLD AUTO: 0.01 K/UL (ref 0–0.04)
IMM GRANULOCYTES NFR BLD: 0.2 % (ref 0–0.4)
LYMPHOCYTES # BLD AUTO: 0.41 K/UL (ref 1–4.8)
LYMPHOCYTES NFR BLD AUTO: 8.6 % (ref 27–41)
LYMPHOCYTES NFR BLD MANUAL: 10 % (ref 27–41)
MCH RBC QN AUTO: 30.6 PG (ref 27–31)
MCHC RBC AUTO-ENTMCNC: 31.7 G/DL (ref 32–36)
MCV RBC AUTO: 96.4 FL (ref 80–96)
MONOCYTES # BLD AUTO: 0.35 K/UL (ref 0–0.8)
MONOCYTES NFR BLD AUTO: 7.4 % (ref 2–6)
MONOCYTES NFR BLD MANUAL: 6 % (ref 2–6)
MPC BLD CALC-MCNC: 10.3 FL (ref 9.4–12.4)
NEUTROPHILS # BLD AUTO: 3.99 K/UL (ref 1.8–7.7)
NEUTROPHILS NFR BLD AUTO: 83.8 % (ref 53–65)
NEUTS SEG NFR BLD MANUAL: 84 % (ref 50–62)
NRBC # BLD AUTO: 0 X10E3/UL
NRBC, AUTO (.00): 0 %
PLATELET # BLD AUTO: 166 K/UL (ref 150–400)
PLATELET MORPHOLOGY: NORMAL
POTASSIUM SERPL-SCNC: 4.6 MMOL/L (ref 3.5–5.1)
RBC # BLD AUTO: 3.66 M/UL (ref 4.6–6.2)
RBC MORPH BLD: NORMAL
SODIUM SERPL-SCNC: 142 MMOL/L (ref 136–145)
WBC # BLD AUTO: 4.76 K/UL (ref 4.5–11)

## 2023-04-10 PROCEDURE — 80048 BASIC METABOLIC PNL TOTAL CA: CPT | Performed by: NURSE PRACTITIONER

## 2023-04-10 PROCEDURE — 94660 CPAP INITIATION&MGMT: CPT

## 2023-04-10 PROCEDURE — 25000242 PHARM REV CODE 250 ALT 637 W/ HCPCS: Performed by: INTERNAL MEDICINE

## 2023-04-10 PROCEDURE — 94761 N-INVAS EAR/PLS OXIMETRY MLT: CPT

## 2023-04-10 PROCEDURE — 25000003 PHARM REV CODE 250: Performed by: NURSE PRACTITIONER

## 2023-04-10 PROCEDURE — 51702 INSERT TEMP BLADDER CATH: CPT

## 2023-04-10 PROCEDURE — 25000242 PHARM REV CODE 250 ALT 637 W/ HCPCS: Performed by: NURSE PRACTITIONER

## 2023-04-10 PROCEDURE — 27000221 HC OXYGEN, UP TO 24 HOURS

## 2023-04-10 PROCEDURE — 94640 AIRWAY INHALATION TREATMENT: CPT

## 2023-04-10 PROCEDURE — 63600175 PHARM REV CODE 636 W HCPCS: Performed by: NURSE PRACTITIONER

## 2023-04-10 PROCEDURE — 11000001 HC ACUTE MED/SURG PRIVATE ROOM

## 2023-04-10 PROCEDURE — 25000003 PHARM REV CODE 250: Performed by: STUDENT IN AN ORGANIZED HEALTH CARE EDUCATION/TRAINING PROGRAM

## 2023-04-10 PROCEDURE — 99233 SBSQ HOSP IP/OBS HIGH 50: CPT | Mod: ,,, | Performed by: INTERNAL MEDICINE

## 2023-04-10 PROCEDURE — 99900035 HC TECH TIME PER 15 MIN (STAT)

## 2023-04-10 PROCEDURE — 99233 PR SUBSEQUENT HOSPITAL CARE,LEVL III: ICD-10-PCS | Mod: ,,, | Performed by: INTERNAL MEDICINE

## 2023-04-10 PROCEDURE — 25000242 PHARM REV CODE 250 ALT 637 W/ HCPCS: Performed by: STUDENT IN AN ORGANIZED HEALTH CARE EDUCATION/TRAINING PROGRAM

## 2023-04-10 PROCEDURE — 85025 COMPLETE CBC W/AUTO DIFF WBC: CPT | Performed by: NURSE PRACTITIONER

## 2023-04-10 RX ORDER — LEVALBUTEROL INHALATION SOLUTION 1.25 MG/3ML
1.25 SOLUTION RESPIRATORY (INHALATION) EVERY 6 HOURS
Status: DISCONTINUED | OUTPATIENT
Start: 2023-04-10 | End: 2023-04-12 | Stop reason: HOSPADM

## 2023-04-10 RX ADMIN — ATORVASTATIN CALCIUM 80 MG: 80 TABLET, FILM COATED ORAL at 08:04

## 2023-04-10 RX ADMIN — MUPIROCIN: 20 OINTMENT TOPICAL at 08:04

## 2023-04-10 RX ADMIN — IPRATROPIUM BROMIDE 0.5 MG: 0.5 SOLUTION RESPIRATORY (INHALATION) at 12:04

## 2023-04-10 RX ADMIN — LEVALBUTEROL HYDROCHLORIDE 1.25 MG: 1.25 SOLUTION RESPIRATORY (INHALATION) at 12:04

## 2023-04-10 RX ADMIN — LEVALBUTEROL HYDROCHLORIDE 1.25 MG: 1.25 SOLUTION RESPIRATORY (INHALATION) at 07:04

## 2023-04-10 RX ADMIN — APIXABAN 2.5 MG: 2.5 TABLET, FILM COATED ORAL at 08:04

## 2023-04-10 RX ADMIN — METHYLPREDNISOLONE SODIUM SUCCINATE 60 MG: 40 INJECTION, POWDER, FOR SOLUTION INTRAMUSCULAR; INTRAVENOUS at 12:04

## 2023-04-10 RX ADMIN — IPRATROPIUM BROMIDE 0.5 MG: 0.5 SOLUTION RESPIRATORY (INHALATION) at 07:04

## 2023-04-10 RX ADMIN — BUDESONIDE 0.5 MG: 0.5 INHALANT ORAL at 07:04

## 2023-04-10 RX ADMIN — AZITHROMYCIN DIHYDRATE 500 MG: 500 INJECTION, POWDER, LYOPHILIZED, FOR SOLUTION INTRAVENOUS at 01:04

## 2023-04-10 RX ADMIN — DEXTROSE MONOHYDRATE 1 G: 5 INJECTION INTRAVENOUS at 01:04

## 2023-04-10 RX ADMIN — ASPIRIN 81 MG: 81 TABLET, DELAYED RELEASE ORAL at 08:04

## 2023-04-10 NOTE — SUBJECTIVE & OBJECTIVE
Interval History: No acute events overnight. The patient is currently resting comfortably. Extubated on 4/9. Currently afebrile and vital signs are stable.      Objective:     Vital Signs (Most Recent):  Temp: 97.5 °F (36.4 °C) (04/10/23 0746)  Pulse: 89 (04/10/23 1219)  Resp: 17 (04/10/23 1219)  BP: (!) 112/55 (04/10/23 0900)  SpO2: (!) 91 % (04/10/23 1219)   Vital Signs (24h Range):  Temp:  [97.5 °F (36.4 °C)-98.8 °F (37.1 °C)] 97.5 °F (36.4 °C)  Pulse:  [] 89  Resp:  [12-25] 17  SpO2:  [88 %-97 %] 91 %  BP: ()/(55-81) 112/55     Weight: 97.4 kg (214 lb 11.7 oz)  Body mass index is 26.84 kg/m².      Intake/Output Summary (Last 24 hours) at 4/10/2023 1239  Last data filed at 4/10/2023 0538  Gross per 24 hour   Intake --   Output 1250 ml   Net -1250 ml       Physical Exam  Vitals and nursing note reviewed.   Constitutional:       General: He is not in acute distress.     Appearance: He is obese. He is not ill-appearing.      Interventions: He is sedated. He is not intubated.  HENT:      Head: Normocephalic and atraumatic.      Right Ear: External ear normal.      Left Ear: External ear normal.      Mouth/Throat:      Mouth: Mucous membranes are moist.      Pharynx: Oropharynx is clear.   Eyes:      Extraocular Movements: Extraocular movements intact.      Conjunctiva/sclera: Conjunctivae normal.      Pupils: Pupils are equal, round, and reactive to light.   Cardiovascular:      Rate and Rhythm: Normal rate and regular rhythm.      Pulses: Normal pulses.      Heart sounds: Normal heart sounds.   Pulmonary:      Effort: Pulmonary effort is normal. No respiratory distress. He is not intubated.      Breath sounds: No wheezing or rales.   Abdominal:      General: Abdomen is flat. Bowel sounds are normal.      Palpations: Abdomen is soft.   Musculoskeletal:         General: No tenderness. Normal range of motion.      Cervical back: Normal range of motion and neck supple.   Skin:     General: Skin is warm and  dry.      Capillary Refill: Capillary refill takes less than 2 seconds.      Coloration: Skin is not pale.   Neurological:      General: No focal deficit present.      Mental Status: He is alert. Mental status is at baseline.      Cranial Nerves: No cranial nerve deficit.      Motor: No weakness.   Psychiatric:         Behavior: Behavior normal.     Review of Systems    Vents:  Vent Mode: A/C (04/09/23 0815)  Set Rate: 18 BPM (04/09/23 0815)  Vt Set: 500 mL (04/09/23 0815)  Pressure Support: 10 cmH20 (04/08/23 1525)  PEEP/CPAP: 5 cmH20 (04/09/23 0815)  Oxygen Concentration (%): 36 (04/10/23 1219)  Peak Airway Pressure: 31 cmH20 (04/09/23 0815)  Total Ve: 10.6 L/m (04/09/23 0815)  F/VT Ratio<105 (RSBI): (!) 36.47 (04/09/23 0815)    Lines/Drains/Airways       Drain  Duration                  Urethral Catheter 04/07/23 1000 Non-latex 16 Fr. 3 days              Peripheral Intravenous Line  Duration                  Peripheral IV - Single Lumen 04/07/23 0910 18 G Left Antecubital 3 days         Peripheral IV - Single Lumen 04/07/23 1100 20 G Left;Posterior Hand 3 days         Peripheral IV - Single Lumen 04/07/23 1700 20 G Posterior;Right Hand 2 days                    Significant Labs:    CBC/Anemia Profile:  Recent Labs   Lab 04/09/23  0533 04/10/23  0444   WBC 6.08 4.76   HGB 10.8* 11.2*   HCT 34.5* 35.3*    166   MCV 95.6 96.4*   RDW 14.2 14.2        Chemistries:  Recent Labs   Lab 04/09/23  0533 04/10/23  0444    142   K 4.4 4.6    105   CO2 31 31   BUN 45* 43*   CREATININE 1.78* 1.32*   CALCIUM 9.0 8.8       All pertinent labs within the past 24 hours have been reviewed.    Significant Imaging:  I have reviewed all pertinent imaging results/findings within the past 24 hours.

## 2023-04-10 NOTE — PLAN OF CARE
[facility] ICU  Initial Discharge Assessment       Primary Care Provider: Jayant Castro MD    Admission Diagnosis: Shortness of breath [R06.02]  Respiratory acidosis [E87.29]  A-fib [I48.91]  Acute exacerbation of chronic obstructive pulmonary disease (COPD) [J44.1]  Acute on chronic respiratory failure with hypercapnia [J96.22]    Admission Date: 4/7/2023  Expected Discharge Date:     Discharge Barriers Identified: None    Payor: [employer] MANAGED MEDICARE / Plan: [employer] MEDICARE PPO / Product Type: Medicare Advantage /     Extended Emergency Contact Information  Primary Emergency Contact: HANNAH HUMPHREYS  Mobile Phone: [phone]  Relation: Daughter   needed? No  Secondary Emergency Contact: brian rodriguez  Mobile Phone: [phone]  Relation: Other  Preferred language: English   needed? No    Discharge Plan A: Home  Discharge Plan B: Home      [id number] - [place], [place] - [address] AT [facility]  [address]  [facility] [address]  Phone: [phone] Fax: [phone]      Initial Assessment (most recent)       Adult Discharge Assessment - 04/10/23 1152          Discharge Assessment    Assessment Type Discharge Planning Assessment     Confirmed/corrected address, phone number and insurance Yes     Confirmed Demographics Correct on Facesheet     Source of Information patient     Does patient/caregiver understand observation status Yes     Communicated ANGELIQUE with patient/caregiver Date not available/Unable to determine     People in Home child(glen), adult     Facility Arrived From: home     Do you expect to return to your current living situation? Yes     Do you have help at home or someone to help you manage your care at home? No     Home Layout Able to live on 1st floor     Equipment Currently Used at Home rollator;oxygen     Readmission within 30 days? No     Patient currently being followed by outpatient case management? No     Do you  currently have service(s) that help you manage your care at home? No     Do you take prescription medications? Yes     Do you have prescription coverage? Yes     Coverage human[employer]     Do you have any problems affording any of your prescribed medications? No     Is the patient taking medications as prescribed? yes     How do you get to doctors appointments? family or friend will provide;car, drives self     Are you on dialysis? No     Do you take coumadin? No     Discharge Plan A Home     Discharge Plan B Home     DME Needed Upon Discharge  none     Discharge Plan discussed with: Patient     Discharge Barriers Identified None        Physical Activity    On average, how many days per week do you engage in moderate to strenuous exercise (like a brisk walk)? 0 days     On average, how many minutes do you engage in exercise at this level? 0 min        Financial Resource Strain    How hard is it for you to pay for the very basics like food, housing, medical care, and heating? Not hard at all        Housing Stability    In the last 12 months, was there a time when you were not able to pay the mortgage or rent on time? No     In the last 12 months, how many places have you lived? 1     In the last 12 months, was there a time when you did not have a steady place to sleep or slept in a shelter (including now)? No        Transportation Needs    In the past 12 months, has lack of transportation kept you from medical appointments or from getting medications? No     In the past 12 months, has lack of transportation kept you from meetings, work, or from getting things needed for daily living? No        Food Insecurity    Within the past 12 months, you worried that your food would run out before you got the money to buy more. Never true     Within the past 12 months, the food you bought just didn't last and you didn't have money to get more. Never true        Stress    Do you feel stress - tense, restless, nervous, or anxious, or  unable to sleep at night because your mind is troubled all the time - these days? Not at all        Social Connections    In a typical week, how many times do you talk on the phone with family, friends, or neighbors? More than three times a week     How often do you get together with friends or relatives? More than three times a week     How often do you attend Gnosticist or Zoroastrian services? More than 4 times per year     Do you belong to any clubs or organizations such as Gnosticist groups, unions, fraternal or athletic groups, or school groups? Yes     How often do you attend meetings of the clubs or organizations you belong to? More than 4 times per year     Are you , , , , never , or living with a partner?         Alcohol Use    Q1: How often do you have a drink containing alcohol? Never     Q2: How many drinks containing alcohol do you have on a typical day when you are drinking? Patient does not drink     Q3: How often do you have six or more drinks on one occasion? Never                   Spoke with pt in room. He lives home with son and plans to return when medically stable. Pt has dme at home. Denies hh. Will follow dc needs as arise.

## 2023-04-10 NOTE — NURSING TRANSFER
Nursing Transfer Note      4/10/2023stab  Reason patient is being transferred stable    Transfer : from ics 4 to 439    Transfer via bed    Transfer with o2     Transported by two rn's      Medicines sent: nq    Any special needs or follow-up needed: na    Chart send with patient: no    Notified: daughters    Patient reassessed at: 04/10/2023 1445   (date, time)    Upon arrival to floor: oriented to room, call light in reach bed low

## 2023-04-10 NOTE — HOSPITAL COURSE
4/10- patient is now extubated and doing well on nasal cannula.He is resting comfortably and his breathing is stable.

## 2023-04-10 NOTE — ASSESSMENT & PLAN NOTE
Baseline Cr 1.44     Trended up to 1.97 - likely volume contraction - will treat with IVF bolus and repeat in am  4/10----1.32 today

## 2023-04-10 NOTE — PLAN OF CARE
Problem: Infection  Goal: Absence of Infection Signs and Symptoms  Outcome: Ongoing, Progressing  Intervention: Prevent or Manage Infection  Flowsheets (Taken 4/10/2023 0405)  Infection Management: aseptic technique maintained  Isolation Precautions: precautions maintained     Problem: Fall Injury Risk  Goal: Absence of Fall and Fall-Related Injury  Outcome: Ongoing, Progressing  Intervention: Identify and Manage Contributors  Flowsheets (Taken 4/10/2023 0405)  Self-Care Promotion:   independence encouraged   safe use of adaptive equipment encouraged  Medication Review/Management: medications reviewed     Problem: Nutrition Impairment (Mechanical Ventilation, Invasive)  Goal: Optimal Nutrition Delivery  Outcome: Ongoing, Progressing

## 2023-04-10 NOTE — ASSESSMENT & PLAN NOTE
Intubated prior to arrival  -  On home O2   - ABGs on AC RR 18, , peep 5, fio2 50% -- 7.35/61/54  - CT chest without PE- concern for pneumonia in RLL  - steroids, bronchodilators, azithromycin and rocephin   - CT head negative   4/9-- placed on SBT this am - did well, ABGs reviewed - plan to extubate to NC today and use bipap at night   4/10- now on nasal cannula and doing well

## 2023-04-10 NOTE — ASSESSMENT & PLAN NOTE
On multiple inhalers at home - follows with Dr. Alicea in clinic   Currently on pulmicort, solumedrol, atrovent, and xopenex

## 2023-04-10 NOTE — PROGRESS NOTES
[facility]  Pulmonology  Progress Note    Patient Name: Nathan Hinton  MRN: [id number]  Admission Date: 4/7/2023  Hospital Length of Stay: 3 days  Code Status: Prior  Attending Provider: Edmond Waddell DO  Primary Care Provider: Jayant Castro MD   Principal Problem: Acute exacerbation of chronic obstructive pulmonary disease (COPD)    Subjective:     Interval History: No acute events overnight. The patient is currently resting comfortably. Extubated on 4/9. Currently afebrile and vital signs are stable.      Objective:     Vital Signs (Most Recent):  Temp: 97.5 °F (36.4 °C) (04/10/23 0746)  Pulse: 89 (04/10/23 1219)  Resp: 17 (04/10/23 1219)  BP: (!) 112/55 (04/10/23 0900)  SpO2: (!) 91 % (04/10/23 1219)   Vital Signs (24h Range):  Temp:  [97.5 °F (36.4 °C)-98.8 °F (37.1 °C)] 97.5 °F (36.4 °C)  Pulse:  [] 89  Resp:  [12-25] 17  SpO2:  [88 %-97 %] 91 %  BP: ()/(55-81) 112/55     Weight: 97.4 kg (214 lb 11.7 oz)  Body mass index is 26.84 kg/m².      Intake/Output Summary (Last 24 hours) at 4/10/2023 1239  Last data filed at 4/10/2023 0538  Gross per 24 hour   Intake --   Output 1250 ml   Net -1250 ml       Physical Exam  Vitals and nursing note reviewed.   Constitutional:       General: He is not in acute distress.     Appearance: He is obese. He is not ill-appearing.      Interventions: He is sedated. He is not intubated.  HENT:      Head: Normocephalic and atraumatic.      Right Ear: External ear normal.      Left Ear: External ear normal.      Mouth/Throat:      Mouth: Mucous membranes are moist.      Pharynx: Oropharynx is clear.   Eyes:      Extraocular Movements: Extraocular movements intact.      Conjunctiva/sclera: Conjunctivae normal.      Pupils: Pupils are equal, round, and reactive to light.   Cardiovascular:      Rate and Rhythm: Normal rate and regular rhythm.      Pulses: Normal pulses.      Heart sounds: Normal heart sounds.   Pulmonary:      Effort: Pulmonary  effort is normal. No respiratory distress. He is not intubated.      Breath sounds: No wheezing or rales.   Abdominal:      General: Abdomen is flat. Bowel sounds are normal.      Palpations: Abdomen is soft.   Musculoskeletal:         General: No tenderness. Normal range of motion.      Cervical back: Normal range of motion and neck supple.   Skin:     General: Skin is warm and dry.      Capillary Refill: Capillary refill takes less than 2 seconds.      Coloration: Skin is not pale.   Neurological:      General: No focal deficit present.      Mental Status: He is alert. Mental status is at baseline.      Cranial Nerves: No cranial nerve deficit.      Motor: No weakness.   Psychiatric:         Behavior: Behavior normal.     Review of Systems    Vents:  Vent Mode: A/C (04/09/23 0815)  Set Rate: 18 BPM (04/09/23 0815)  Vt Set: 500 mL (04/09/23 0815)  Pressure Support: 10 cmH20 (04/08/23 1525)  PEEP/CPAP: 5 cmH20 (04/09/23 0815)  Oxygen Concentration (%): 36 (04/10/23 1219)  Peak Airway Pressure: 31 cmH20 (04/09/23 0815)  Total Ve: 10.6 L/m (04/09/23 0815)  F/VT Ratio<105 (RSBI): (!) 36.47 (04/09/23 0815)    Lines/Drains/Airways       Drain  Duration                  Urethral Catheter 04/07/23 1000 Non-latex 16 Fr. 3 days              Peripheral Intravenous Line  Duration                  Peripheral IV - Single Lumen 04/07/23 0910 18 G Left Antecubital 3 days         Peripheral IV - Single Lumen 04/07/23 1100 20 G Left;Posterior Hand 3 days         Peripheral IV - Single Lumen 04/07/23 1700 20 G Posterior;Right Hand 2 days                    Significant Labs:    CBC/Anemia Profile:  Recent Labs   Lab 04/09/23  0533 04/10/23  0444   WBC 6.08 4.76   HGB 10.8* 11.2*   HCT 34.5* 35.3*    166   MCV 95.6 96.4*   RDW 14.2 14.2        Chemistries:  Recent Labs   Lab 04/09/23  0533 04/10/23  0444    142   K 4.4 4.6    105   CO2 31 31   BUN 45* 43*   CREATININE 1.78* 1.32*   CALCIUM 9.0 8.8       All pertinent  labs within the past 24 hours have been reviewed.    Significant Imaging:  I have reviewed all pertinent imaging results/findings within the past 24 hours.    Assessment/Plan:     Pulmonary  * Acute exacerbation of chronic obstructive pulmonary disease (COPD)  Intubated prior to arrival  -  On home O2   - ABGs on AC RR 18, , peep 5, fio2 50% -- 7.35/61/54  - CT chest without PE- concern for pneumonia in RLL  - steroids, bronchodilators, azithromycin and rocephin   - CT head negative   4/9-- placed on SBT this am - did well, ABGs reviewed - plan to extubate to NC today and use bipap at night   4/10- now on nasal cannula and doing well      COPD (chronic obstructive pulmonary disease)  On multiple inhalers at home - follows with Dr. Alicea in clinic   Currently on pulmicort, solumedrol, atrovent, and xopenex    Cardiac/Vascular  A-fib  New onset- Afib rate 117 on EKG in ER   - likely secondary to pulmonary disease   - PE ruled out   - MI ruled out   - will check Echo and start wt based Lovenox   - CHADs 2 score - 2   4/9-- Echo still pending - rate controlled - will transition to Eliquis today - recommended dose 2.5mg BID due to age > 80 and Cr >1.5         Hypertension  Stable but on lower side  - hold HTN medications for now and resume as needed      Renal/  CKD (chronic kidney disease), stage III  Baseline Cr 1.44     Trended up to 1.97 - likely volume contraction - will treat with IVF bolus and repeat in am  4/10----1.32 today                 Edmond Waddell, DO  Pulmonology  [facility]

## 2023-04-11 LAB
ANION GAP SERPL CALCULATED.3IONS-SCNC: 13 MMOL/L (ref 7–16)
ATYPICAL LYMPHOCYTES: ABNORMAL
BASOPHILS # BLD AUTO: 0 K/UL (ref 0–0.2)
BASOPHILS NFR BLD AUTO: 0 % (ref 0–1)
BUN SERPL-MCNC: 48 MG/DL (ref 7–18)
BUN/CREAT SERPL: 40 (ref 6–20)
CALCIUM SERPL-MCNC: 9.1 MG/DL (ref 8.5–10.1)
CHLORIDE SERPL-SCNC: 107 MMOL/L (ref 98–107)
CO2 SERPL-SCNC: 28 MMOL/L (ref 21–32)
CREAT SERPL-MCNC: 1.21 MG/DL (ref 0.7–1.3)
DIFFERENTIAL METHOD BLD: ABNORMAL
EGFR (NO RACE VARIABLE) (RUSH/TITUS): 59 ML/MIN/1.73M²
EOSINOPHIL # BLD AUTO: 0 K/UL (ref 0–0.5)
EOSINOPHIL NFR BLD AUTO: 0 % (ref 1–4)
ERYTHROCYTE [DISTWIDTH] IN BLOOD BY AUTOMATED COUNT: 14.6 % (ref 11.5–14.5)
GLUCOSE SERPL-MCNC: 99 MG/DL (ref 74–106)
HCT VFR BLD AUTO: 36.4 % (ref 40–54)
HGB BLD-MCNC: 11.6 G/DL (ref 13.5–18)
IMM GRANULOCYTES # BLD AUTO: 0.02 K/UL (ref 0–0.04)
IMM GRANULOCYTES NFR BLD: 0.3 % (ref 0–0.4)
LYMPHOCYTES # BLD AUTO: 1.68 K/UL (ref 1–4.8)
LYMPHOCYTES NFR BLD AUTO: 28.8 % (ref 27–41)
LYMPHOCYTES NFR BLD MANUAL: 29 % (ref 27–41)
MCH RBC QN AUTO: 30.4 PG (ref 27–31)
MCHC RBC AUTO-ENTMCNC: 31.9 G/DL (ref 32–36)
MCV RBC AUTO: 95.3 FL (ref 80–96)
MONOCYTES # BLD AUTO: 0.72 K/UL (ref 0–0.8)
MONOCYTES NFR BLD AUTO: 12.3 % (ref 2–6)
MONOCYTES NFR BLD MANUAL: 11 % (ref 2–6)
MPC BLD CALC-MCNC: 10.5 FL (ref 9.4–12.4)
NEUTROPHILS # BLD AUTO: 3.42 K/UL (ref 1.8–7.7)
NEUTROPHILS NFR BLD AUTO: 58.6 % (ref 53–65)
NEUTS SEG NFR BLD MANUAL: 60 % (ref 50–62)
NRBC # BLD AUTO: 0 X10E3/UL
NRBC, AUTO (.00): 0 %
PLATELET # BLD AUTO: 164 K/UL (ref 150–400)
PLATELET MORPHOLOGY: ABNORMAL
POTASSIUM SERPL-SCNC: 4.6 MMOL/L (ref 3.5–5.1)
RBC # BLD AUTO: 3.82 M/UL (ref 4.6–6.2)
RBC MORPH BLD: NORMAL
SODIUM SERPL-SCNC: 143 MMOL/L (ref 136–145)
WBC # BLD AUTO: 5.84 K/UL (ref 4.5–11)

## 2023-04-11 PROCEDURE — 94640 AIRWAY INHALATION TREATMENT: CPT

## 2023-04-11 PROCEDURE — 63600175 PHARM REV CODE 636 W HCPCS: Performed by: STUDENT IN AN ORGANIZED HEALTH CARE EDUCATION/TRAINING PROGRAM

## 2023-04-11 PROCEDURE — 63600175 PHARM REV CODE 636 W HCPCS: Performed by: NURSE PRACTITIONER

## 2023-04-11 PROCEDURE — 25000242 PHARM REV CODE 250 ALT 637 W/ HCPCS: Performed by: INTERNAL MEDICINE

## 2023-04-11 PROCEDURE — 27000221 HC OXYGEN, UP TO 24 HOURS

## 2023-04-11 PROCEDURE — 85025 COMPLETE CBC W/AUTO DIFF WBC: CPT | Performed by: NURSE PRACTITIONER

## 2023-04-11 PROCEDURE — 99233 PR SUBSEQUENT HOSPITAL CARE,LEVL III: ICD-10-PCS | Mod: ,,, | Performed by: STUDENT IN AN ORGANIZED HEALTH CARE EDUCATION/TRAINING PROGRAM

## 2023-04-11 PROCEDURE — 97161 PT EVAL LOW COMPLEX 20 MIN: CPT

## 2023-04-11 PROCEDURE — 25000003 PHARM REV CODE 250: Performed by: STUDENT IN AN ORGANIZED HEALTH CARE EDUCATION/TRAINING PROGRAM

## 2023-04-11 PROCEDURE — 80048 BASIC METABOLIC PNL TOTAL CA: CPT | Performed by: NURSE PRACTITIONER

## 2023-04-11 PROCEDURE — 11000001 HC ACUTE MED/SURG PRIVATE ROOM

## 2023-04-11 PROCEDURE — 94761 N-INVAS EAR/PLS OXIMETRY MLT: CPT

## 2023-04-11 PROCEDURE — 99900035 HC TECH TIME PER 15 MIN (STAT)

## 2023-04-11 PROCEDURE — 25000003 PHARM REV CODE 250: Performed by: NURSE PRACTITIONER

## 2023-04-11 PROCEDURE — 94660 CPAP INITIATION&MGMT: CPT

## 2023-04-11 PROCEDURE — 25000242 PHARM REV CODE 250 ALT 637 W/ HCPCS: Performed by: NURSE PRACTITIONER

## 2023-04-11 PROCEDURE — 25000242 PHARM REV CODE 250 ALT 637 W/ HCPCS: Performed by: STUDENT IN AN ORGANIZED HEALTH CARE EDUCATION/TRAINING PROGRAM

## 2023-04-11 PROCEDURE — 99233 SBSQ HOSP IP/OBS HIGH 50: CPT | Mod: ,,, | Performed by: STUDENT IN AN ORGANIZED HEALTH CARE EDUCATION/TRAINING PROGRAM

## 2023-04-11 RX ADMIN — PREDNISONE 50 MG: 20 TABLET ORAL at 09:04

## 2023-04-11 RX ADMIN — DEXTROSE MONOHYDRATE 1 G: 5 INJECTION INTRAVENOUS at 02:04

## 2023-04-11 RX ADMIN — LEVALBUTEROL HYDROCHLORIDE 1.25 MG: 1.25 SOLUTION RESPIRATORY (INHALATION) at 07:04

## 2023-04-11 RX ADMIN — METOPROLOL SUCCINATE 12.5 MG: 25 TABLET, EXTENDED RELEASE ORAL at 02:04

## 2023-04-11 RX ADMIN — MUPIROCIN: 20 OINTMENT TOPICAL at 09:04

## 2023-04-11 RX ADMIN — AZITHROMYCIN DIHYDRATE 500 MG: 500 INJECTION, POWDER, LYOPHILIZED, FOR SOLUTION INTRAVENOUS at 02:04

## 2023-04-11 RX ADMIN — IPRATROPIUM BROMIDE 0.5 MG: 0.5 SOLUTION RESPIRATORY (INHALATION) at 07:04

## 2023-04-11 RX ADMIN — IPRATROPIUM BROMIDE 0.5 MG: 0.5 SOLUTION RESPIRATORY (INHALATION) at 01:04

## 2023-04-11 RX ADMIN — ASPIRIN 81 MG: 81 TABLET, DELAYED RELEASE ORAL at 09:04

## 2023-04-11 RX ADMIN — BUDESONIDE 0.5 MG: 0.5 INHALANT ORAL at 08:04

## 2023-04-11 RX ADMIN — LEVALBUTEROL HYDROCHLORIDE 1.25 MG: 1.25 SOLUTION RESPIRATORY (INHALATION) at 12:04

## 2023-04-11 RX ADMIN — BUDESONIDE 0.5 MG: 0.5 INHALANT ORAL at 07:04

## 2023-04-11 RX ADMIN — IPRATROPIUM BROMIDE 0.5 MG: 0.5 SOLUTION RESPIRATORY (INHALATION) at 12:04

## 2023-04-11 RX ADMIN — APIXABAN 2.5 MG: 2.5 TABLET, FILM COATED ORAL at 09:04

## 2023-04-11 RX ADMIN — ATORVASTATIN CALCIUM 80 MG: 80 TABLET, FILM COATED ORAL at 09:04

## 2023-04-11 RX ADMIN — LEVALBUTEROL HYDROCHLORIDE 1.25 MG: 1.25 SOLUTION RESPIRATORY (INHALATION) at 01:04

## 2023-04-11 NOTE — PT/OT/SLP EVAL
Physical Therapy Evaluation     Patient Name: Nathan Hinton   MRN: [id number]  Recent Surgery: * No surgery found *      Recommendations:     Discharge Recommendations: home health PT   Discharge Equipment Recommendations: none   Barriers to discharge: Ongoing medical treatment    Assessment:     Nathan Hinton is a 82 y.o. male admitted with a medical diagnosis of Acute exacerbation of chronic obstructive pulmonary disease (COPD). He presents with the following impairments/functional limitations: weakness, impaired endurance, impaired functional mobility, gait instability. Pt was recently transferred out of ICU after being intubated x3 days. Pt is generally weak but motivated to return to activity. Pt lives with family who is able to assist. He was able to ambulate with rolling walker for short distance but requires assistance for safety. PT to follow to address noted deficits    Rehab Prognosis: Good; patient would benefit from acute PT services to address these deficits and reach maximum level of function.    Plan:     During this hospitalization, patient to be seen 5 x/week to address the above listed problems via gait training, therapeutic activities, therapeutic exercises    Plan of Care Expires: 05/11/23    Subjective     Chief Complaint: shortness of breath   Patient Comments/Goals: Pt agreeable to PT  Pain/Comfort:  Pain Rating 1: 0/10  Pain Rating Post-Intervention 1: 0/10    Social History:  Living Environment: Patient lives with their son in a single story home with number of outside stair(s): 0  Prior Level of Function: Prior to admission, patient ambulated household distances using rollator  Equipment Used at Home: rollator, oxygen, bedside commode  DME owned (not currently used): wheelchair  Assistance Upon Discharge: family    Objective:     Communicated with RAMIRO Salazar RN prior to session. Patient found HOB elevated with peripheral IV, oxygen, SCD, telemetry upon PT entry to room.    General Precautions:  Standard, fall   Orthopedic Precautions: N/A   Braces: N/A    Respiratory Status: Nasal cannula, flow 2 L/min    Exams:  Cognition: Patient is oriented to Person, Place, Time, Situation  RLE ROM: WFL  RLE Strength: WFL  LLE ROM: WFL  LLE Strength: WFL  Gross Motor Coordination: WFL  Sensation:    -       Intact    Functional Mobility:  Gait belt applied - Yes  Bed Mobility  Scooting: contact guard assistance  Supine to Sit: minimum assistance for trunk management  Transfers  Sit to Stand: minimum assistance with rolling walker  Toilet Transfer: minimum assistance with rolling walker using Step Transfer  Gait  Patient ambulated 15 ft with rolling walker and contact guard assistance. Patient demonstrates decreased step length, decreased foot clearance, and decreased karli. . All lines remained intact throughout ambulation trail.  Balance  Sitting: contact guard assistance  Standing: contact guard assistance      Therapeutic Activities and Exercises:   Patient educated on role of acute care PT and PT POC, safety while in hospital including calling nurse for mobility, and call light usage      AM-PAC 6 CLICK MOBILITY  Total Score:17    Patient left  in restroom on toilet  with all lines intact.    GOALS:   Multidisciplinary Problems       Physical Therapy Goals          Problem: Physical Therapy    Goal Priority Disciplines Outcome Goal Variances Interventions   Physical Therapy Goal     PT, PT/OT Ongoing, Progressing     Description: Short term goals:  1. Supine to sit with Contact Guard Assistance  2. Sit to stand transfer with Contact Guard Assistance  3. Gait  x 100 feet with Contact Guard Assistance using Rolling Walker.     Long term goals:  1. Supine to sit with Modified [place]  2. Sit to stand transfer with Modified [place]  3. Gait  x 300 feet with Modified [place] using Rolling Walker.                          History:     Past Medical History:   Diagnosis Date    #[id number]     COPD (chronic  obstructive pulmonary disease)     Hypertension     Sarcoma        Past Surgical History:   Procedure Laterality Date    ABDOMINAL AORTIC ANEURYSM REPAIR      BACK SURGERY      FLEXOR TENDON REPAIR Right 11/4/2021    Procedure: REPAIR, TENDON, FLEXOR;  Surgeon: Dani Degroot MD;  Location: [facility];  Service: Orthopedics;  Laterality: Right;    SURGICAL REMOVAL OF SARCOMA      TRIGGER FINGER RELEASE Right 11/4/2021    Procedure: RELEASE, TRIGGER FINGER;  Surgeon: Dani Degroot MD;  Location: [facility];  Service: Orthopedics;  Laterality: Right;    VASCULAR SURGERY         Time Tracking:     PT Received On: 04/11/23  PT Start Time: 1522  PT Stop Time: 1534  PT Total Time (min): 12 min     Billable Minutes: Evaluation low complexity    4/11/2023     Complex Repair And Graft Additional Text (Will Appearing After The Standard Complex Repair Text): The complex repair was not sufficient to completely close the primary defect. The remaining additional defect was repaired with the graft mentioned below.

## 2023-04-11 NOTE — HOSPITAL COURSE
4/11-doing very well. PT/OT today. Pull davison. Chart reviewed  4/12- stable for discharge. Follow up with PCP. Ac and bb for new afib.  Will send home with maintenance inhalers- atrovent and budesonide.  He has albuterol nebs at home already.

## 2023-04-11 NOTE — PLAN OF CARE
Problem: Physical Therapy  Goal: Physical Therapy Goal  Description: Short term goals:  1. Supine to sit with Contact Guard Assistance  2. Sit to stand transfer with Contact Guard Assistance  3. Gait  x 100 feet with Contact Guard Assistance using Rolling Walker.     Long term goals:  1. Supine to sit with Modified [place]  2. Sit to stand transfer with Modified [place]  3. Gait  x 300 feet with Modified [place] using Rolling Walker.     Outcome: Ongoing, Progressing

## 2023-04-11 NOTE — PLAN OF CARE
Problem: Infection  Goal: Absence of Infection Signs and Symptoms  Outcome: Ongoing, Progressing  Intervention: Prevent or Manage Infection  Flowsheets (Taken 4/11/2023 0500)  Fever Reduction/Comfort Measures: lightweight bedding  Isolation Precautions:   protective   precautions maintained     Problem: Adult Inpatient Plan of Care  Goal: Plan of Care Review  Outcome: Ongoing, Progressing  Flowsheets (Taken 4/11/2023 0507)  Plan of Care Reviewed With:   patient   family  Goal: Patient-Specific Goal (Individualized)  Outcome: Ongoing, Progressing  Flowsheets (Taken 4/11/2023 0507)  Anxieties, Fears or Concerns: management of oxygen  Individualized Care Needs: management of oxygen  Goal: Absence of Hospital-Acquired Illness or Injury  Outcome: Ongoing, Progressing  Intervention: Identify and Manage Fall Risk  Flowsheets (Taken 4/11/2023 0507)  Safety Promotion/Fall Prevention:   assistive device/personal item within reach   commode/urinal/bedpan at bedside   diversional activities provided   lighting adjusted   medications reviewed   family to remain at bedside   room near unit station   side rails raised x 3   instructed to call staff for mobility  Intervention: Prevent Skin Injury  Flowsheets (Taken 4/11/2023 0507)  Body Position:   position changed independently   supine  Skin Protection:   adhesive use limited   incontinence pads utilized  Intervention: Prevent and Manage VTE (Venous Thromboembolism) Risk  Flowsheets (Taken 4/11/2023 0507)  Activity Management:   Arm raise - L1   Rolling - L1  VTE Prevention/Management:   remove, assess skin, and reapply sequential compression device   fluids promoted   ROM (active) performed  Range of Motion: ROM (range of motion) performed  Intervention: Prevent Infection  Flowsheets (Taken 4/11/2023 0507)  Infection Prevention:   equipment surfaces disinfected   hand hygiene promoted   rest/sleep promoted   single patient room provided  Goal: Optimal Comfort and  Wellbeing  Outcome: Ongoing, Progressing  Intervention: Monitor Pain and Promote Comfort  Flowsheets (Taken 4/11/2023 0507)  Pain Management Interventions:   care clustered   diversional activity provided   quiet environment facilitated   relaxation techniques promoted   position adjusted   pillow support provided  Intervention: Provide Person-Centered Care  Flowsheets (Taken 4/11/2023 0507)  Trust Relationship/Rapport:   choices provided   care explained   questions answered   questions encouraged  Goal: Readiness for Transition of Care  Outcome: Ongoing, Progressing  Intervention: Mutually Develop Transition Plan  Flowsheets (Taken 4/11/2023 0507)  Transportation Anticipated: family or friend will provide  Communicated ANGELIQUE with patient/caregiver: Date not available/Unable to determine  Do you expect to return to your current living situation?: Yes     Problem: Fall Injury Risk  Goal: Absence of Fall and Fall-Related Injury  Outcome: Ongoing, Progressing  Intervention: Identify and Manage Contributors  Flowsheets (Taken 4/11/2023 0507)  Self-Care Promotion: independence encouraged  Medication Review/Management: medications reviewed  Intervention: Promote Injury-Free Environment  Flowsheets (Taken 4/11/2023 0507)  Safety Promotion/Fall Prevention:   assistive device/personal item within reach   commode/urinal/bedpan at bedside   diversional activities provided   lighting adjusted   medications reviewed   family to remain at bedside   room near unit station   side rails raised x 3   instructed to call staff for mobility     Problem: Restraint, Nonbehavioral (Nonviolent)  Goal: Absence of Harm or Injury  Outcome: Ongoing, Progressing     Problem: Communication Impairment (Mechanical Ventilation, Invasive)  Goal: Effective Communication  Outcome: Ongoing, Progressing  Intervention: Ensure Effective Communication  Flowsheets (Taken 4/11/2023 0507)  Communication Enhancement Strategies: call light answered in person      Problem: Device-Related Complication Risk (Mechanical Ventilation, Invasive)  Goal: Optimal Device Function  Outcome: Ongoing, Progressing     Problem: Inability to Wean (Mechanical Ventilation, Invasive)  Goal: Mechanical Ventilation Liberation  Outcome: Ongoing, Progressing  Intervention: Promote Extubation and Mechanical Ventilation Liberation  Flowsheets (Taken 4/11/2023 0507)  Sleep/Rest Enhancement:   regular sleep/rest pattern promoted   relaxation techniques promoted  Medication Review/Management: medications reviewed     Problem: Nutrition Impairment (Mechanical Ventilation, Invasive)  Goal: Optimal Nutrition Delivery  Outcome: Ongoing, Progressing     Problem: Skin and Tissue Injury (Mechanical Ventilation, Invasive)  Goal: Absence of Device-Related Skin and Tissue Injury  Outcome: Ongoing, Progressing  Intervention: Maintain Skin and Tissue Health  Flowsheets (Taken 4/11/2023 0517)  Device Skin Pressure Protection:   absorbent pad utilized/changed   adhesive use limited   positioning supports utilized     Problem: Ventilator-Induced Lung Injury (Mechanical Ventilation, Invasive)  Goal: Absence of Ventilator-Induced Lung Injury  Outcome: Ongoing, Progressing  Intervention: Prevent Ventilator-Associated Pneumonia  Flowsheets (Taken 4/11/2023 0517)  Head of Bed (HOB) Positioning:   HOB at 20-30 degrees   HOB at 30-45 degrees     Problem: Communication Impairment (Artificial Airway)  Goal: Effective Communication  Outcome: Ongoing, Progressing  Intervention: Ensure Effective Communication  Flowsheets (Taken 4/11/2023 0517)  Diversional Activities: television  Family/Support System Care: support provided  Trust Relationship/Rapport:   choices provided   care explained   questions answered   questions encouraged     Problem: Device-Related Complication Risk (Artificial Airway)  Goal: Optimal Device Function  Outcome: Ongoing, Progressing

## 2023-04-11 NOTE — SUBJECTIVE & OBJECTIVE
Interval History: naeo    Review of Systems   Constitutional: Negative.  Negative for chills and fever.   HENT: Negative.     Eyes:  Negative for pain and redness.   Respiratory:  Positive for shortness of breath. Negative for cough, chest tightness and wheezing.    Cardiovascular:  Negative for chest pain and palpitations.   Gastrointestinal:  Negative for abdominal pain, diarrhea, nausea and vomiting.   Endocrine: Negative for cold intolerance, heat intolerance and polyuria.   Genitourinary:  Negative for difficulty urinating, dysuria, frequency and hematuria.   Musculoskeletal:  Positive for arthralgias and back pain. Negative for myalgias, neck pain and neck stiffness.   Skin:  Negative for pallor and rash.   Allergic/Immunologic: Negative.    Neurological:  Negative for dizziness, syncope, weakness, numbness and headaches.   Hematological:  Negative for adenopathy.   Psychiatric/Behavioral:  Negative for agitation, confusion and hallucinations. The patient is not nervous/anxious.    Objective:     Vital Signs (Most Recent):  Temp: 98.2 °F (36.8 °C) (04/11/23 1200)  Pulse: 94 (04/11/23 1231)  Resp: 18 (04/11/23 1231)  BP: 122/73 (04/11/23 1200)  SpO2: 97 % (04/11/23 1231) Vital Signs (24h Range):  Temp:  [97.7 °F (36.5 °C)-98.6 °F (37 °C)] 98.2 °F (36.8 °C)  Pulse:  [] 94  Resp:  [13-22] 18  SpO2:  [90 %-98 %] 97 %  BP: (106-152)/(68-86) 122/73     Weight: 97.4 kg (214 lb 11.7 oz)  Body mass index is 26.84 kg/m².    Intake/Output Summary (Last 24 hours) at 4/11/2023 1317  Last data filed at 4/11/2023 0400  Gross per 24 hour   Intake --   Output 450 ml   Net -450 ml      Physical Exam  Vitals reviewed.   Constitutional:       Appearance: Normal appearance.   HENT:      Head: Normocephalic and atraumatic.   Eyes:      Extraocular Movements: Extraocular movements intact.   Cardiovascular:      Rate and Rhythm: Normal rate. Rhythm irregular.      Pulses: Normal pulses.   Pulmonary:      Effort: Pulmonary  effort is normal.      Breath sounds: Normal breath sounds.   Abdominal:      General: Abdomen is flat.      Palpations: Abdomen is soft.   Musculoskeletal:      Comments: Normal tone, moves all extremities   Skin:     General: Skin is warm and dry.      Capillary Refill: Capillary refill takes less than 2 seconds.   Neurological:      General: No focal deficit present.      Mental Status: He is alert and oriented to person, place, and time.   Psychiatric:         Mood and Affect: Mood normal.         Behavior: Behavior normal.       Significant Labs: All pertinent labs within the past 24 hours have been reviewed.    Significant Imaging: I have reviewed all pertinent imaging results/findings within the past 24 hours.

## 2023-04-12 VITALS
HEART RATE: 94 BPM | RESPIRATION RATE: 17 BRPM | HEIGHT: 75 IN | DIASTOLIC BLOOD PRESSURE: 68 MMHG | OXYGEN SATURATION: 98 % | TEMPERATURE: 98 F | BODY MASS INDEX: 26.7 KG/M2 | SYSTOLIC BLOOD PRESSURE: 114 MMHG | WEIGHT: 214.75 LBS

## 2023-04-12 PROBLEM — J44.1 ACUTE EXACERBATION OF CHRONIC OBSTRUCTIVE PULMONARY DISEASE (COPD): Status: RESOLVED | Noted: 2023-04-07 | Resolved: 2023-04-12

## 2023-04-12 LAB
ANION GAP SERPL CALCULATED.3IONS-SCNC: 11 MMOL/L (ref 7–16)
BASOPHILS # BLD AUTO: 0 K/UL (ref 0–0.2)
BASOPHILS NFR BLD AUTO: 0 % (ref 0–1)
BUN SERPL-MCNC: 39 MG/DL (ref 7–18)
BUN/CREAT SERPL: 35 (ref 6–20)
CALCIUM SERPL-MCNC: 9.1 MG/DL (ref 8.5–10.1)
CHLORIDE SERPL-SCNC: 105 MMOL/L (ref 98–107)
CO2 SERPL-SCNC: 31 MMOL/L (ref 21–32)
CREAT SERPL-MCNC: 1.12 MG/DL (ref 0.7–1.3)
DIFFERENTIAL METHOD BLD: ABNORMAL
EGFR (NO RACE VARIABLE) (RUSH/TITUS): 65 ML/MIN/1.73M²
EOSINOPHIL # BLD AUTO: 0.01 K/UL (ref 0–0.5)
EOSINOPHIL NFR BLD AUTO: 0.2 % (ref 1–4)
ERYTHROCYTE [DISTWIDTH] IN BLOOD BY AUTOMATED COUNT: 14.4 % (ref 11.5–14.5)
GLUCOSE SERPL-MCNC: 107 MG/DL (ref 74–106)
HCT VFR BLD AUTO: 37 % (ref 40–54)
HGB BLD-MCNC: 11.6 G/DL (ref 13.5–18)
IMM GRANULOCYTES # BLD AUTO: 0.02 K/UL (ref 0–0.04)
IMM GRANULOCYTES NFR BLD: 0.4 % (ref 0–0.4)
LYMPHOCYTES # BLD AUTO: 0.89 K/UL (ref 1–4.8)
LYMPHOCYTES NFR BLD AUTO: 18.9 % (ref 27–41)
MCH RBC QN AUTO: 30.3 PG (ref 27–31)
MCHC RBC AUTO-ENTMCNC: 31.4 G/DL (ref 32–36)
MCV RBC AUTO: 96.6 FL (ref 80–96)
MONOCYTES # BLD AUTO: 0.64 K/UL (ref 0–0.8)
MONOCYTES NFR BLD AUTO: 13.6 % (ref 2–6)
MPC BLD CALC-MCNC: 10.4 FL (ref 9.4–12.4)
NEUTROPHILS # BLD AUTO: 3.16 K/UL (ref 1.8–7.7)
NEUTROPHILS NFR BLD AUTO: 66.9 % (ref 53–65)
NRBC # BLD AUTO: 0 X10E3/UL
NRBC, AUTO (.00): 0 %
PLATELET # BLD AUTO: 184 K/UL (ref 150–400)
POTASSIUM SERPL-SCNC: 4.9 MMOL/L (ref 3.5–5.1)
RBC # BLD AUTO: 3.83 M/UL (ref 4.6–6.2)
SODIUM SERPL-SCNC: 142 MMOL/L (ref 136–145)
WBC # BLD AUTO: 4.72 K/UL (ref 4.5–11)

## 2023-04-12 PROCEDURE — 94640 AIRWAY INHALATION TREATMENT: CPT

## 2023-04-12 PROCEDURE — 25000003 PHARM REV CODE 250: Performed by: NURSE PRACTITIONER

## 2023-04-12 PROCEDURE — 1111F PR DISCHARGE MEDS RECONCILED W/ CURRENT OUTPATIENT MED LIST: ICD-10-PCS | Mod: CPTII,,, | Performed by: STUDENT IN AN ORGANIZED HEALTH CARE EDUCATION/TRAINING PROGRAM

## 2023-04-12 PROCEDURE — 85025 COMPLETE CBC W/AUTO DIFF WBC: CPT | Performed by: NURSE PRACTITIONER

## 2023-04-12 PROCEDURE — 99900035 HC TECH TIME PER 15 MIN (STAT)

## 2023-04-12 PROCEDURE — 27000221 HC OXYGEN, UP TO 24 HOURS

## 2023-04-12 PROCEDURE — 1111F DSCHRG MED/CURRENT MED MERGE: CPT | Mod: CPTII,,, | Performed by: STUDENT IN AN ORGANIZED HEALTH CARE EDUCATION/TRAINING PROGRAM

## 2023-04-12 PROCEDURE — 94660 CPAP INITIATION&MGMT: CPT

## 2023-04-12 PROCEDURE — 99239 HOSP IP/OBS DSCHRG MGMT >30: CPT | Mod: ,,, | Performed by: STUDENT IN AN ORGANIZED HEALTH CARE EDUCATION/TRAINING PROGRAM

## 2023-04-12 PROCEDURE — 25000242 PHARM REV CODE 250 ALT 637 W/ HCPCS: Performed by: INTERNAL MEDICINE

## 2023-04-12 PROCEDURE — 25000003 PHARM REV CODE 250: Performed by: STUDENT IN AN ORGANIZED HEALTH CARE EDUCATION/TRAINING PROGRAM

## 2023-04-12 PROCEDURE — 99239 PR HOSPITAL DISCHARGE DAY,>30 MIN: ICD-10-PCS | Mod: ,,, | Performed by: STUDENT IN AN ORGANIZED HEALTH CARE EDUCATION/TRAINING PROGRAM

## 2023-04-12 PROCEDURE — 80048 BASIC METABOLIC PNL TOTAL CA: CPT | Performed by: NURSE PRACTITIONER

## 2023-04-12 PROCEDURE — 97110 THERAPEUTIC EXERCISES: CPT

## 2023-04-12 PROCEDURE — 25000242 PHARM REV CODE 250 ALT 637 W/ HCPCS: Performed by: NURSE PRACTITIONER

## 2023-04-12 PROCEDURE — 63600175 PHARM REV CODE 636 W HCPCS: Performed by: STUDENT IN AN ORGANIZED HEALTH CARE EDUCATION/TRAINING PROGRAM

## 2023-04-12 PROCEDURE — 94761 N-INVAS EAR/PLS OXIMETRY MLT: CPT

## 2023-04-12 PROCEDURE — 25000242 PHARM REV CODE 250 ALT 637 W/ HCPCS: Performed by: STUDENT IN AN ORGANIZED HEALTH CARE EDUCATION/TRAINING PROGRAM

## 2023-04-12 RX ORDER — PREDNISONE 10 MG/1
TABLET ORAL
Qty: 53 TABLET | Refills: 0 | Status: SHIPPED | OUTPATIENT
Start: 2023-04-13 | End: 2023-05-09

## 2023-04-12 RX ORDER — BUDESONIDE 0.5 MG/2ML
0.5 INHALANT ORAL EVERY 12 HOURS
Qty: 360 ML | Refills: 3 | Status: SHIPPED | OUTPATIENT
Start: 2023-04-12 | End: 2023-07-19 | Stop reason: SDUPTHER

## 2023-04-12 RX ORDER — IPRATROPIUM BROMIDE 0.5 MG/2.5ML
500 SOLUTION RESPIRATORY (INHALATION) EVERY 8 HOURS
Qty: 675 ML | Refills: 3 | Status: SHIPPED | OUTPATIENT
Start: 2023-04-12 | End: 2023-07-19 | Stop reason: SDUPTHER

## 2023-04-12 RX ORDER — METOPROLOL SUCCINATE 25 MG/1
12.5 TABLET, EXTENDED RELEASE ORAL DAILY
Qty: 15 TABLET | Refills: 11 | Status: SHIPPED | OUTPATIENT
Start: 2023-04-13 | End: 2024-04-12

## 2023-04-12 RX ADMIN — IPRATROPIUM BROMIDE 0.5 MG: 0.5 SOLUTION RESPIRATORY (INHALATION) at 01:04

## 2023-04-12 RX ADMIN — BUDESONIDE 0.5 MG: 0.5 INHALANT ORAL at 07:04

## 2023-04-12 RX ADMIN — PREDNISONE 50 MG: 20 TABLET ORAL at 09:04

## 2023-04-12 RX ADMIN — APIXABAN 2.5 MG: 2.5 TABLET, FILM COATED ORAL at 09:04

## 2023-04-12 RX ADMIN — ATORVASTATIN CALCIUM 80 MG: 80 TABLET, FILM COATED ORAL at 09:04

## 2023-04-12 RX ADMIN — IPRATROPIUM BROMIDE 0.5 MG: 0.5 SOLUTION RESPIRATORY (INHALATION) at 07:04

## 2023-04-12 RX ADMIN — LEVALBUTEROL HYDROCHLORIDE 1.25 MG: 1.25 SOLUTION RESPIRATORY (INHALATION) at 01:04

## 2023-04-12 RX ADMIN — METOPROLOL SUCCINATE 12.5 MG: 25 TABLET, EXTENDED RELEASE ORAL at 09:04

## 2023-04-12 RX ADMIN — ASPIRIN 81 MG: 81 TABLET, DELAYED RELEASE ORAL at 09:04

## 2023-04-12 RX ADMIN — LEVALBUTEROL HYDROCHLORIDE 1.25 MG: 1.25 SOLUTION RESPIRATORY (INHALATION) at 07:04

## 2023-04-12 NOTE — RESPIRATORY THERAPY
Treatment given by RT student, Tamela Delgado.    04/12/23 0755   Patient Assessment/Suction   Level of Consciousness (AVPU) alert   Respiratory Effort Unlabored   Expansion/Accessory Muscles/Retractions no use of accessory muscles;no retractions   All Lung Fields Breath Sounds Anterior:;Lateral:;equal bilaterally;clear;diminished   Rhythm/Pattern, Respiratory unlabored;pattern regular;depth regular;no shortness of breath reported   Cough Frequency no cough   PRE-TX-O2   Device (Oxygen Therapy) nasal cannula with humidification   $ Is the patient on Low Flow Oxygen? Yes   Flow (L/min) 4   Oxygen Concentration (%) 36   SpO2 97 %   Pulse Oximetry Type Intermittent   $ Pulse Oximetry - Multiple Charge Pulse Oximetry - Multiple   Pulse 94   Resp 19   Positioning Sitting on edge of bed (dangling)   Positioning   Body Position position maintained   Aerosol Therapy   $ Aerosol Therapy Charges Aerosol Treatment   Daily Review of Necessity (SVN) completed   Respiratory Treatment Status (SVN) given   Treatment Route (SVN) oxygen;mask   Patient Position (SVN) sitting on edge of bed   Post Treatment Assessment (SVN) breath sounds unchanged   Signs of Intolerance (SVN) none   Breath Sounds Post-Respiratory Treatment   Throughout All Fields Post-Treatment All Fields   Throughout All Fields Post-Treatment Anterior:;Lateral:;no change   Post-treatment Heart Rate (beats/min) 93   Post-treatment Resp Rate (breaths/min) 18   Preset CPAP/BiPAP Settings   Mode Of Delivery BiPAP S/T   $ CPAP/BiPAP Daily Charge BiPAP/CPAP Daily   $ Is patient using? Other (see comments)  (Already off of BIPAP.)

## 2023-04-12 NOTE — DISCHARGE SUMMARY
[facility] Medicine  Discharge Summary      Patient Name: Nathan Hinton  MRN: [id number]  BRIDGETT: [id number]  Patient Class: IP- Inpatient  Admission Date: 4/7/2023  Hospital Length of Stay: 5 days  Discharge Date and Time:  04/12/2023 11:07 AM  Attending Physician: Shaquille Zamora DO   Discharging Provider: Shaquille Zamora DO  Primary Care Provider: Jayant Castro MD    Primary Care Team: Networked reference to record PCT     HPI:   No notes on file    * No surgery found *      Hospital Course:   4/11-doing very well. PT/OT today. Pull davison. Chart reviewed  4/12- stable for discharge. Follow up with PCP. Ac and bb for new afib.  Will send home with maintenance inhalers- atrovent and budesonide.  He has albuterol nebs at home already.         Goals of Care Treatment Preferences:  Code Status: Full Code      Consults:   Consults (From admission, onward)        Status Ordering Provider     Inpatient consult to Social Work  Once        Provider:  (Not yet assigned)    Ordered SHAQUILLE ZAMORA          Pulmonary  * Acute exacerbation of chronic obstructive pulmonary disease (COPD)-resolved as of 4/12/2023  Near baseline, taper steroids      COPD (chronic obstructive pulmonary disease)  Steroid taper  Maintenance nebs      Cardiac/Vascular  A-fib  eliquis  bb  PCP follow up    Hypertension    Adjust meds as needed    Renal/  CKD (chronic kidney disease), stage III  better        Final Active Diagnoses:    Diagnosis Date Noted POA    A-fib [I48.91] 04/07/2023 Unknown    CKD (chronic kidney disease), stage III [N18.30] 04/07/2023 Unknown    Hypertension [I10]  Yes     Chronic    COPD (chronic obstructive pulmonary disease) [J44.9]  Yes     Chronic      Problems Resolved During this Admission:    Diagnosis Date Noted Date Resolved POA    PRINCIPAL PROBLEM:  Acute exacerbation of chronic obstructive pulmonary disease (COPD) [J44.1] 04/07/2023 04/12/2023 Yes       Discharged Condition:  good    Disposition: Home-Health Care [facility]    Follow Up:   Follow-up Information     Jayant Castro MD. Schedule an appointment as soon as possible for a visit in 1 week(s).    Specialties: Family Medicine, Emergency Medicine  Why: Please follow up on April 19 at 10:10  Contact information:  [address]  [facility]  [place] [address]  [phone]                       Patient Instructions:      Diet Cardiac     Activity as tolerated       Significant Diagnostic Studies: Labs: All labs within the past 24 hours have been reviewed    Pending Diagnostic Studies:     None         Medications:  Reconciled Home Medications:      Medication List      START taking these medications    apixaban 2.5 mg Tab  Commonly known as: ELIQUIS  Take 1 tablet (2.5 mg total) by mouth 2 (two) times daily.     budesonide 0.5 mg/2 mL nebulizer solution  Commonly known as: PULMICORT  Take 2 mLs (0.5 mg total) by nebulization every 12 (twelve) hours. Controller     ipratropium 0.02 % nebulizer solution  Commonly known as: ATROVENT  Take 2.5 mLs (500 mcg total) by nebulization every 8 (eight) hours. Rescue     metoprolol succinate 25 MG 24 hr tablet  Commonly known as: TOPROL-XL  Take 0.5 tablets (12.5 mg total) by mouth once daily.  Start taking on: April 13, 2023        CHANGE how you take these medications    predniSONE 10 MG tablet  Commonly known as: DELTASONE  Take 5 tablets (50 mg total) by mouth once daily for 5 days, THEN 2.5 tablets (25 mg total) once daily for 7 days, THEN 1 tablet (10 mg total) once daily for 7 days, THEN 0.5 tablets (5 mg total) once daily for 7 days.  Start taking on: April 13, 2023  What changed:   · medication strength  · See the new instructions.  · Another medication with the same name was removed. Continue taking this medication, and follow the directions you see here.     triamcinolone acetonide 0.1% 0.1 % cream  Commonly known as: KENALOG  Apply topically 2 (two) times daily.  What  changed: Another medication with the same name was removed. Continue taking this medication, and follow the directions you see here.        CONTINUE taking these medications    albuterol sulfate 2.5 mg/0.5 mL Nebu  Take 2.5 mg by nebulization every 4 (four) hours as needed (P.r.n. wheezing). Rescue     AMLODIPINE ORAL  Take 5 mg by mouth Daily.     ANORO ELLIPTA 62.5-25 mcg/actuation Dsdv  Generic drug: umeclidinium-vilanteroL  Inhale into the lungs. Controller     aspirin 81 MG EC tablet  Commonly known as: ECOTRIN  Take 81 mg by mouth once daily.     atorvastatin 80 MG tablet  Commonly known as: LIPITOR  Take 80 mg by mouth.     * BREZTRI AEROSPHERE 160-9-4.8 mcg/actuation Hfaa  Generic drug: budesonide-glycopyr-formoterol  Inhale 2 puffs into the lungs 2 (two) times a day.     * BREZTRI AEROSPHERE 160-9-4.8 mcg/actuation Hfaa  Generic drug: budesonide-glycopyr-formoterol  Inhale 2 puffs into the lungs 2 (two) times a day.     ciclopirox 8 % Soln  Commonly known as: PENLAC  Apply topically nightly.     HYDROcodone-acetaminophen 7.5-325 mg per tablet  Commonly known as: NORCO  Take 1 tablet by mouth every 6 (six) hours as needed for Pain.     MULTI VITAMIN ORAL  Take by mouth.     MYRBETRIQ 25 mg Tb24 ER tablet  Generic drug: mirabegron  Take 1 tablet (25 mg total) by mouth once daily.     omeprazole 20 MG capsule  Commonly known as: PRILOSEC  Take 20 mg by mouth.     tacrolimus 0.1 % ointment  Commonly known as: PROTOPIC  Apply topically 2 (two) times daily.         * This list has 2 medication(s) that are the same as other medications prescribed for you. Read the directions carefully, and ask your doctor or other care provider to review them with you.            STOP taking these medications    azithromycin 250 MG tablet  Commonly known as: Z-LENA            Indwelling Lines/Drains at time of discharge:   Lines/Drains/Airways     None                 Time spent on the discharge of patient: >30 minutes          Shaquille Zamora DO  Department of Hospital Medicine  [facility]

## 2023-04-12 NOTE — PLAN OF CARE
[facility]  Discharge Final Note    Primary Care Provider: Jayant Castro MD    Expected Discharge Date: 4/12/2023    Final Discharge Note (most recent)       Final Note - 04/12/23 1124          Final Note    Assessment Type Final Discharge Note     Anticipated Discharge Disposition Home-Health Care Svc        Post-Acute Status    Post-Acute Authorization Home Health     Home Health Status Referrals Sent     Patient choice form signed by patient/caregiver List with quality metrics by geographic area provided;List from CMS Compare;List from System Post-Acute Care     Discharge Delays None known at this time                   Consult for hh. Choice obtained for [facility] hh. Referral faxed.  Important Message from Medicare             Contact Info       Jayant Castro MD   Specialty: Family Medicine, Emergency Medicine   Relationship: PCP - General    [address]  [facility]  [place] [id number]   Phone: [phone]       Next Steps: Schedule an appointment as soon as possible for a visit in 1 week(s)    Instructions: Please follow up on April 19 at 10:10

## 2023-04-12 NOTE — PHYSICIAN QUERY
PT Name: Nathan Hinton  MR #: [id number]     DOCUMENTATION CLARIFICATION     CDS/: ANNEL IGLESIAS MSN RN              Contact information: [email or web address]  This form is a permanent document in the medical record.     Query Date: April 12, 2023    By submitting this query, we are merely seeking further clarification of documentation.  Please utilize your independent clinical judgment when addressing the question(s) below.  The Medical Record contains the following   Indicators   Supporting Clinical Findings Location in Medical Record   x SOB, GARCIA, Wheezing, Productive Cough, Use of Accessory Muscles, etc. History  Chief complaint  Shortness of breath    Physical Exam  Pulmonary/Chest  Effort normal. He has wheezes   ED PN, Dr. Rubio, 04/07   x RR         ABGs         O2 sat Vital Signs (24h Range):  Temp:  [100 °F (37.8 °C)] 100 °F (37.8 °C)  Pulse:  [] 94  Resp:  [15-22] 18  SpO2:  [89 %-100 %] 96 %  BP: ()/(50-66) 100/56      ABG   04/07/23 10:05 04/07/23 13:20 04/07/23 13:24   POC PH 7.26 (L) 7.35 7.35   POC PCO2 83 HH 62 (H) 61 (H)   POC CO2 39.7 36.1 35.6   POC PO2 226 (H) 54 (L) 54 (L)   POC HCO3 37.2 (H) 34.2 (H) 33.7 (H)   POC SATURATED O2 100 (H) 86 (L) 86 (L)    H&P, [facility]-ACNP, 04/07                Labs, 04/07    Hypoxia/Hypercapnia     x BiPAP/Intubation/Mechanical Ventilation Physical Exam:  Neurological:  Patient is intubate with minimal response to pain. He does not follow commands. ED PNDr. Rubio, 04/07    Supplemental O2     x   Home O2, Oxygen Dependence Assessment/Plan:   Pulmonary  Acute exacerbation of chronic obstructive pulmonary disease (COPD)  Intubated prior to arrival  -  On home O2       x Respiratory distress or failure Patient is a 82-year-old male with history of COPD who is brought by EMS.  EMS states that when they arrived seen patient had agonal respirations and was intubated.  Patient arrives here intubated and unresponsive.  He did receive ketamine prior  to arrival.  No further history is available on this patient this time.  Review of outside medical record shows patient does have history of COPD.    ED PNDr. Rubio, 04/07   x Radiology findings Chest xray  Impression:  Findings suggest mild cardiac decompensation and/or pneumonitis   Radiology results, 04/07   x Acute/Chronic Illness Assessment/Plan:   Acute exacerbation of chronic obstructive pulmonary disease (COPD)  COPD  A-fib  Hypertension  CKD (chronic kidney disease) stage III   H&P, [facility]-ACNP, 04/07   x Treatment Mechanical ventilation  CT chest  Albuterol-Ipratropium neb x 1 in ED  IV rocephin 1 g daily  IV zithromax 500 mg every 24 hr  Budesonide neb inhalation every 12 hr  Lavalbuterol  neb every 8 hr   IV solumedrol 125 mg x 1 in ED  IV solumedrol 60 mg every 6 hr   IV solumedrol 60 mg every 12 hr   Flowsheets, 04/07-04/09  Radiology results, 04/07  MAR, 04/07  MAR, 04/07-04/11  MAR, 04/07=04/11  MAR, 04/07-04/12  MAR, 04/07-04/12  MAR, 04/07  MAR, 04/07-04/09  MAR, 04/09-04/10   x Other ED Course  After patient's family arrived I was able to obtain additional history. Family states that they had been having trouble getting his oxygen levels to stay up over the last few days and he has been needing more breathing treatments. They called the ambulance this morning because they were not able to get the patient to wake up.    Clinical Impression:  Final diagnoses:  Shortness of breath  Acute exacerbation of chronic obstructive pulmonary disease (COPD) (Primary)  Respiratory acidosis  Acute on chronic respiratory failure with hypercapnia    Assessment/Plan:  Pulmonary  Acute exacerbation of chronic obstructive pulmonary disease (COPD)  Intubated prior to arrival   ABGs on AC RR 18, , peep 5, fio2 50% -- 7.35/61/54  CT chest without PE- concern for pneumonia in RLL  steroids, bronchodilators, azithromycin and rocephin   rest on vent today - start weaning in AM   CT head negative  ED PNDr. Rubio,  04/07                                    H&P, [facility]-ACNP, 04/07       The noted clinical guidelines are only system guidelines and do not replace the providers clinical judgment.    Provider, please specify the diagnosis or diagnoses associated with above clinical findings.   [  x  ] Acute and (on) Chronic Respiratory Failure with Hypercapnia - pCO2 >10 mmHg over baseline and pH < 7.35 and respiratory symptoms documented   [    ] Acute and (on) Chronic Respiratory Failure with Hypoxia and Hypercapnia - Hypoxic: pO2 >10 mmHg below baseline or SpO2 < 91% on usual home O2 or O2 ? 2L/min over baseline home O2 and Hypercapnic: pCO2 >10 mmHg over baseline and pH < 7.35 and respiratory symptoms documented   [    ] Chronic Respiratory Failure with Hypoxia and Hypercapnia with Acute COPD exacerbation   [    ] Chronic Respiratory Failure with Hypercapnia with Acute COPD exacerbation   [    ] Other Respiratory Diagnosis (please specify): _________________   [   ] Clinically Undetermined         Please document in your progress notes daily for the duration of treatment until resolved and include in your discharge summary.     Reference:    DEQUAN White MD. (2020, March 13). Acute respiratory distress syndrome: Clinical features, diagnosis, and complications in adults ([id number] KATHERINE Quiñonez MD & [id number] MELVA Kaiser MD, Eds.). Retrieved November 13, 2020, from [email or web address]-respiratory-distress-syndrome-clinical-features-diagnosis-and-complications-[email or web address]-adults?search=ards&source=search_result&selectedTitle=1~150&usage_type=default&display_rank=1  Form No. 21437

## 2023-04-12 NOTE — PLAN OF CARE
Problem: Infection  Goal: Absence of Infection Signs and Symptoms  Outcome: Ongoing, Progressing  Intervention: Prevent or Manage Infection  Flowsheets (Taken 4/11/2023 2124)  Fever Reduction/Comfort Measures: lightweight clothing  Infection Management: aseptic technique maintained     Problem: Adult Inpatient Plan of Care  Goal: Plan of Care Review  Outcome: Ongoing, Progressing  Flowsheets (Taken 4/11/2023 2124)  Plan of Care Reviewed With: patient  Goal: Patient-Specific Goal (Individualized)  Outcome: Ongoing, Progressing  Goal: Absence of Hospital-Acquired Illness or Injury  Outcome: Ongoing, Progressing  Goal: Optimal Comfort and Wellbeing  Outcome: Ongoing, Progressing  Goal: Readiness for Transition of Care  Outcome: Ongoing, Progressing     Problem: Fall Injury Risk  Goal: Absence of Fall and Fall-Related Injury  Outcome: Ongoing, Progressing     Problem: Restraint, Nonbehavioral (Nonviolent)  Goal: Absence of Harm or Injury  Outcome: Ongoing, Progressing     Problem: Communication Impairment (Mechanical Ventilation, Invasive)  Goal: Effective Communication  Outcome: Ongoing, Progressing     Problem: Device-Related Complication Risk (Mechanical Ventilation, Invasive)  Goal: Optimal Device Function  Outcome: Ongoing, Progressing     Problem: Inability to Wean (Mechanical Ventilation, Invasive)  Goal: Mechanical Ventilation Liberation  Outcome: Ongoing, Progressing     Problem: Nutrition Impairment (Mechanical Ventilation, Invasive)  Goal: Optimal Nutrition Delivery  Outcome: Ongoing, Progressing     Problem: Ventilator-Induced Lung Injury (Mechanical Ventilation, Invasive)  Goal: Absence of Ventilator-Induced Lung Injury  Outcome: Ongoing, Progressing

## 2023-04-12 NOTE — PLAN OF CARE
Problem: Infection  Goal: Absence of Infection Signs and Symptoms  Outcome: Ongoing, Progressing     Problem: Adult Inpatient Plan of Care  Goal: Plan of Care Review  Outcome: Ongoing, Progressing     Problem: Fall Injury Risk  Goal: Absence of Fall and Fall-Related Injury  Outcome: Ongoing, Progressing

## 2023-04-12 NOTE — PT/OT/SLP PROGRESS
Physical Therapy Treatment    Patient Name:  Nathan Hinton   MRN:  [id number]    Recommendations:     Discharge Recommendations: home  Discharge Equipment Recommendations: none  Barriers to discharge: None    Assessment:     Nathan Hinton is a 82 y.o. male admitted with a medical diagnosis of Acute exacerbation of chronic obstructive pulmonary disease (COPD).  He presents with the following impairments/functional limitations: weakness, impaired endurance, impaired functional mobility, gait instability Pt demonstrates improving mobility with increased oxygen requirements. Pt is able to ambulate with rolling walker with supervision. Should be able to return home at d/c.    Rehab Prognosis: Good; patient would benefit from acute skilled PT services to address these deficits and reach maximum level of function.    Recent Surgery: * No surgery found *      Plan:     During this hospitalization, patient to be seen 5 x/week to address the identified rehab impairments via gait training, therapeutic activities, therapeutic exercises and progress toward the following goals:    Plan of Care Expires:  05/11/23    Subjective     Chief Complaint: shortness of breath   Patient/Family Comments/goals: Pt agreeable toPT  Pain/Comfort:  Pain Rating Post-Intervention 1: 0/10      Objective:     Communicated with LUIS ALBERTO Holly RN prior to session.  Patient found sitting edge of bed with peripheral IV, oxygen, telemetry upon PT entry to room.     General Precautions: Standard, fall  Orthopedic Precautions: N/A  Braces: N/A  Respiratory Status: Nasal cannula, flow 4 L/min     Functional Mobility:  Bed Mobility:     Scooting: supervision  Transfers:     Sit to Stand:  stand by assistance with rolling walker  Bed to Chair: stand by assistance with  rolling walker  using  Step Transfer  Toilet Transfer: contact guard assistance with  rolling walker  using  Step Transfer  Gait: 100 ft stand-by assistance with rolling walker, slow karli, flexed  posutre  Balance: good      AM-PAC 6 CLICK MOBILITY  Turning over in bed (including adjusting bedclothes, sheets and blankets)?: 4  Sitting down on and standing up from a chair with arms (e.g., wheelchair, bedside commode, etc.): 4  Moving from lying on back to sitting on the side of the bed?: 4  Moving to and from a bed to a chair (including a wheelchair)?: 4  Climbing 3-5 steps with a railing?: 3       Treatment & Education:  Pt performed bilateral LE: ankle pumps, Long arc quads, and Marching x 20 each  Ambulation in hallway as noted above  Assisted on/off toilet    Patient left up in chair with all lines intact and call button in reach..    GOALS:   Multidisciplinary Problems       Physical Therapy Goals          Problem: Physical Therapy    Goal Priority Disciplines Outcome Goal Variances Interventions   Physical Therapy Goal     PT, PT/OT Ongoing, Progressing     Description: Short term goals:  1. Supine to sit with Contact Guard Assistance  2. Sit to stand transfer with Contact Guard Assistance  3. Gait  x 100 feet with Contact Guard Assistance using Rolling Walker.     Long term goals:  1. Supine to sit with Modified [place]  2. Sit to stand transfer with Modified [place]  3. Gait  x 300 feet with Modified [place] using Rolling Walker.                          Time Tracking:     PT Received On: 04/12/23  PT Start Time: 1044     PT Stop Time: 1101  PT Total Time (min): 17 min     Billable Minutes: Therapeutic Exercise 15    Treatment Type: Treatment  PT/PTA: PT     Number of PTA visits since last PT visit: 0     04/12/2023

## 2023-04-12 NOTE — NURSING
Discharge instructions reviewed with patient and daughter (carlos); and copy given to patient. Patient and daughter voiced understanding regarding:meds, appt., signs and symptoms to report to physician.

## 2023-04-12 NOTE — RESPIRATORY THERAPY
Treatment given by RT Student, Tamela Delgado.    04/12/23 2188   Patient Assessment/Suction   Level of Consciousness (AVPU) alert   Respiratory Effort Unlabored   Expansion/Accessory Muscles/Retractions no use of accessory muscles;no retractions   All Lung Fields Breath Sounds Anterior:;Lateral:;clear;equal bilaterally   Rhythm/Pattern, Respiratory unlabored;pattern regular;depth regular;no shortness of breath reported   Cough Frequency no cough   PRE-TX-O2   Device (Oxygen Therapy) nasal cannula with humidification   Flow (L/min) 4   Oxygen Concentration (%) 36   SpO2 98 %   Pulse Oximetry Type Intermittent   Pulse 94   Resp 17   Positioning   Body Position position changed independently   Head of Bed (HOB) Positioning HOB at 60-90 degrees   Positioning/Transfer Devices pillows;in use   Aerosol Therapy   $ Aerosol Therapy Charges Aerosol Treatment   Daily Review of Necessity (SVN) completed   Respiratory Treatment Status (SVN) given   Treatment Route (SVN) oxygen;mask   Patient Position (SVN) HOB elevated   Post Treatment Assessment (SVN) breath sounds unchanged   Signs of Intolerance (SVN) none   Breath Sounds Post-Respiratory Treatment   Throughout All Fields Post-Treatment All Fields   Throughout All Fields Post-Treatment Anterior:;Lateral:;no change   Post-treatment Heart Rate (beats/min) 87   Post-treatment Resp Rate (breaths/min) 19

## 2023-04-13 ENCOUNTER — PATIENT OUTREACH (OUTPATIENT)
Dept: ADMINISTRATIVE | Facility: CLINIC | Age: 83
End: 2023-04-13

## 2023-04-13 ENCOUNTER — TELEPHONE (OUTPATIENT)
Dept: ORTHOPEDICS | Facility: HOSPITAL | Age: 83
End: 2023-04-13
Payer: OTHER GOVERNMENT

## 2023-04-13 LAB — BACTERIA BLD CULT: NORMAL

## 2023-04-13 NOTE — PROGRESS NOTES
C3 nurse spoke with Brooke Villa for a TCC post hospital discharge follow up call. The patient has a scheduled HOSFU appointment with Jayant Castro MD on 04/19/2023 @ 1000.

## 2023-04-13 NOTE — PROGRESS NOTES
Patient's daughter, Brooke, asked if the patient should be taking aspirin and apixaban.   DC summary and DC instructions both instructed patient to take both.   Offered to verify with provider but the patient's daughter, Brooke, states she will discuss with Dr. Castro

## 2023-04-15 LAB
BACTERIA BLD CULT: ABNORMAL
GRAM STN SPEC: ABNORMAL

## 2023-04-17 ENCOUNTER — TELEPHONE (OUTPATIENT)
Dept: ORTHOPEDICS | Facility: HOSPITAL | Age: 83
End: 2023-04-17
Payer: OTHER GOVERNMENT

## 2023-04-17 NOTE — TELEPHONE ENCOUNTER
Patients states doing well, breathing treatments helping a lot, no wheezing at this time;no complaints or concerns voiced

## 2023-04-19 ENCOUNTER — OFFICE VISIT (OUTPATIENT)
Dept: FAMILY MEDICINE | Facility: CLINIC | Age: 83
End: 2023-04-19
Payer: MEDICARE

## 2023-04-19 VITALS
OXYGEN SATURATION: 95 % | RESPIRATION RATE: 16 BRPM | WEIGHT: 230 LBS | HEART RATE: 89 BPM | DIASTOLIC BLOOD PRESSURE: 74 MMHG | HEIGHT: 75 IN | TEMPERATURE: 98 F | SYSTOLIC BLOOD PRESSURE: 120 MMHG | BODY MASS INDEX: 28.6 KG/M2

## 2023-04-19 DIAGNOSIS — J96.02 ACUTE RESPIRATORY FAILURE WITH HYPERCAPNIA: Primary | ICD-10-CM

## 2023-04-19 PROCEDURE — 3074F PR MOST RECENT SYSTOLIC BLOOD PRESSURE < 130 MM HG: ICD-10-PCS | Mod: ,,, | Performed by: FAMILY MEDICINE

## 2023-04-19 PROCEDURE — 3078F PR MOST RECENT DIASTOLIC BLOOD PRESSURE < 80 MM HG: ICD-10-PCS | Mod: ,,, | Performed by: FAMILY MEDICINE

## 2023-04-19 PROCEDURE — 1159F MED LIST DOCD IN RCRD: CPT | Mod: ,,, | Performed by: FAMILY MEDICINE

## 2023-04-19 PROCEDURE — 3288F PR FALLS RISK ASSESSMENT DOCUMENTED: ICD-10-PCS | Mod: ,,, | Performed by: FAMILY MEDICINE

## 2023-04-19 PROCEDURE — 1160F PR REVIEW ALL MEDS BY PRESCRIBER/CLIN PHARMACIST DOCUMENTED: ICD-10-PCS | Mod: ,,, | Performed by: FAMILY MEDICINE

## 2023-04-19 PROCEDURE — 99495 TRANSJ CARE MGMT MOD F2F 14D: CPT | Mod: ,,, | Performed by: FAMILY MEDICINE

## 2023-04-19 PROCEDURE — 1160F RVW MEDS BY RX/DR IN RCRD: CPT | Mod: ,,, | Performed by: FAMILY MEDICINE

## 2023-04-19 PROCEDURE — 1101F PR PT FALLS ASSESS DOC 0-1 FALLS W/OUT INJ PAST YR: ICD-10-PCS | Mod: ,,, | Performed by: FAMILY MEDICINE

## 2023-04-19 PROCEDURE — 1101F PT FALLS ASSESS-DOCD LE1/YR: CPT | Mod: ,,, | Performed by: FAMILY MEDICINE

## 2023-04-19 PROCEDURE — 99495 TCM SERVICES (MODERATE COMPLEXITY): ICD-10-PCS | Mod: ,,, | Performed by: FAMILY MEDICINE

## 2023-04-19 PROCEDURE — 1126F AMNT PAIN NOTED NONE PRSNT: CPT | Mod: ,,, | Performed by: FAMILY MEDICINE

## 2023-04-19 PROCEDURE — 1126F PR PAIN SEVERITY QUANTIFIED, NO PAIN PRESENT: ICD-10-PCS | Mod: ,,, | Performed by: FAMILY MEDICINE

## 2023-04-19 PROCEDURE — 3074F SYST BP LT 130 MM HG: CPT | Mod: ,,, | Performed by: FAMILY MEDICINE

## 2023-04-19 PROCEDURE — 3288F FALL RISK ASSESSMENT DOCD: CPT | Mod: ,,, | Performed by: FAMILY MEDICINE

## 2023-04-19 PROCEDURE — 1159F PR MEDICATION LIST DOCUMENTED IN MEDICAL RECORD: ICD-10-PCS | Mod: ,,, | Performed by: FAMILY MEDICINE

## 2023-04-19 PROCEDURE — 3078F DIAST BP <80 MM HG: CPT | Mod: ,,, | Performed by: FAMILY MEDICINE

## 2023-04-19 NOTE — PROGRESS NOTES
Jayant Castro MD        PATIENT NAME: Nathan Hinton  : 1940  DATE: 23  MRN: [id number]      Billing Provider: Jayant Castro MD  Level of Service:   Patient PCP Information       Provider PCP Type    Jayant Castro MD General            Reason for Visit / Chief Complaint: Transitional Care (Hosp followup COPD pneumonia)       Update PCP  Update Chief Complaint         History of Present Illness / Problem Focused Workflow     Nathan Hinton presents to the clinic with Transitional Care (Hosp followup COPD pneumonia)     TCC for resp failure and pneumonia.      Review of Systems     Review of Systems   Constitutional:  Negative for activity change, appetite change, fever and unexpected weight change.   HENT:  Positive for congestion. Negative for rhinorrhea, sinus pressure, sinus pain, sore throat and trouble swallowing.    Eyes:  Negative for photophobia, pain, discharge and visual disturbance.   Respiratory:  Positive for cough and shortness of breath. Negative for chest tightness, wheezing and stridor.    Cardiovascular:  Negative for chest pain, palpitations and leg swelling.   Gastrointestinal:  Negative for abdominal pain, blood in stool, constipation, diarrhea and nausea.   Endocrine: Negative for polydipsia, polyphagia and polyuria.   Genitourinary:  Negative for difficulty urinating, flank pain and hematuria.   Musculoskeletal:  Negative for arthralgias and neck pain.   Skin:  Negative for rash.   Allergic/Immunologic: Negative for food allergies.   Neurological:  Negative for dizziness, tremors, seizures, syncope, weakness (global weakness) and headaches.   Psychiatric/Behavioral:  Negative for behavioral problems, confusion, decreased concentration, dysphoric mood and hallucinations. The patient is not nervous/anxious.       Medical / Social / Family History     Past Medical History:   Diagnosis Date    #[id number]     COPD (chronic obstructive pulmonary disease)     Hypertension     Sarcoma         Past Surgical History:   Procedure Laterality Date    ABDOMINAL AORTIC ANEURYSM REPAIR      BACK SURGERY      FLEXOR TENDON REPAIR Right 11/4/2021    Procedure: REPAIR, TENDON, FLEXOR;  Surgeon: Dani Degroot MD;  Location: [facility];  Service: Orthopedics;  Laterality: Right;    SURGICAL REMOVAL OF SARCOMA      TRIGGER FINGER RELEASE Right 11/4/2021    Procedure: RELEASE, TRIGGER FINGER;  Surgeon: Dani Degroot MD;  Location: [facility];  Service: Orthopedics;  Laterality: Right;    VASCULAR SURGERY         Social History    reports that he has quit smoking. His smoking use included cigarettes. He has a 30.00 pack-year smoking history. He has been exposed to tobacco smoke. His smokeless tobacco use includes chew. He reports current alcohol use. He reports that he does not use drugs.    Family History  's family history is not on file.    Medications and Allergies     Medications  No outpatient medications have been marked as taking for the 4/19/23 encounter (Office Visit) with Jayant Castro MD.       Allergies  Review of patient's allergies indicates:   Allergen Reactions    Sulfa (sulfonamide antibiotics) Hives       Physical Examination     Vitals:    04/19/23 1010   BP: 120/74   Pulse: 89   Resp: 16   Temp: 97.6 °F (36.4 °C)     Physical Exam  Constitutional:       General: He is not in acute distress.     Appearance: Normal appearance.   HENT:      Head: Normocephalic.      Right Ear: Tympanic membrane and ear canal normal.      Left Ear: Tympanic membrane and ear canal normal.      Nose: Nose normal.      Mouth/Throat:      Mouth: Mucous membranes are moist.      Pharynx: No oropharyngeal exudate.   Eyes:      Extraocular Movements: Extraocular movements intact.      Pupils: Pupils are equal, round, and reactive to light.   Cardiovascular:      Rate and Rhythm: Normal rate and regular rhythm.      Heart sounds: No murmur heard.  Pulmonary:      Effort: Pulmonary  effort is normal.      Breath sounds: Normal breath sounds. No wheezing.      Comments: Using 2 L nasal cannula portable oxygen  Abdominal:      General: Abdomen is flat. Bowel sounds are normal.      Palpations: Abdomen is soft.      Hernia: No hernia is present.   Musculoskeletal:         General: Normal range of motion.      Cervical back: Normal range of motion and neck supple.      Right lower leg: No edema.      Left lower leg: No edema.   Lymphadenopathy:      Cervical: No cervical adenopathy.   Skin:     General: Skin is warm and dry.      Coloration: Skin is not jaundiced.      Findings: No lesion.   Neurological:      General: No focal deficit present.      Mental Status: He is alert and oriented to person, place, and time.      Cranial Nerves: No cranial nerve deficit.      Gait: Gait normal.   Psychiatric:         Mood and Affect: Mood normal.         Behavior: Behavior normal.         Judgment: Judgment normal.        Assessment and Plan (including Health Maintenance)      Problem List  Smart Sets  Document Outside HM   :    Plan:     The current medical regimen is effective;  continue present plan and medications.      Health Maintenance Due   Topic Date Due    Pneumococcal Vaccines (Age 65+) (1 - PCV) Never done    TETANUS VACCINE  Never done    Shingles Vaccine (1 of 2) Never done    COVID-19 Vaccine (3 - Booster for Dejan series) 01/27/2022    Influenza Vaccine (1) 09/01/2022       Problem List Items Addressed This Visit    None      Health Maintenance Topics with due status: Not Due       Topic Last Completion Date    Lipid Panel 08/03/2020       Future Appointments   Date Time Provider Department Center   5/15/2023  2:10 PM Randall Alicea MD [facility]   8/15/2023  2:00 PM Hoda Rivera MD [facility]        There are no Patient Instructions on file for this visit.  No follow-ups on file.     Signature:  Jayant Castro MD      Date of encounter: 4/19/23

## 2023-04-25 ENCOUNTER — EXTERNAL HOME HEALTH (OUTPATIENT)
Dept: HOME HEALTH SERVICES | Facility: HOSPITAL | Age: 83
End: 2023-04-25
Payer: OTHER GOVERNMENT

## 2023-05-08 ENCOUNTER — HOSPITAL ENCOUNTER (EMERGENCY)
Facility: HOSPITAL | Age: 83
Discharge: HOME | End: 2023-05-08
Attending: FAMILY MEDICINE
Payer: MEDICARE

## 2023-05-08 VITALS
BODY MASS INDEX: 31.15 KG/M2 | RESPIRATION RATE: 20 BRPM | DIASTOLIC BLOOD PRESSURE: 80 MMHG | OXYGEN SATURATION: 97 % | SYSTOLIC BLOOD PRESSURE: 124 MMHG | TEMPERATURE: 97 F | HEART RATE: 92 BPM | HEIGHT: 72 IN | WEIGHT: 230 LBS

## 2023-05-08 DIAGNOSIS — R07.9 CHEST PAIN, UNSPECIFIED TYPE: Primary | ICD-10-CM

## 2023-05-08 DIAGNOSIS — R06.02 SOB (SHORTNESS OF BREATH): ICD-10-CM

## 2023-05-08 DIAGNOSIS — I48.91 ATRIAL FIBRILLATION, UNSPECIFIED TYPE: ICD-10-CM

## 2023-05-08 LAB
ALBUMIN SERPL BCP-MCNC: 3.2 G/DL (ref 3.5–5)
ALBUMIN/GLOB SERPL: 1.1 {RATIO}
ALP SERPL-CCNC: 90 U/L (ref 45–115)
ALT SERPL W P-5'-P-CCNC: 25 U/L (ref 16–61)
ANION GAP SERPL CALCULATED.3IONS-SCNC: 12 MMOL/L (ref 7–16)
ANISOCYTOSIS BLD QL SMEAR: ABNORMAL
AST SERPL W P-5'-P-CCNC: 19 U/L (ref 15–37)
ATYPICAL LYMPHOCYTES: ABNORMAL
BASOPHILS # BLD AUTO: 0.02 K/UL (ref 0–0.2)
BASOPHILS NFR BLD AUTO: 0.4 % (ref 0–1)
BILIRUB SERPL-MCNC: 0.7 MG/DL (ref ?–1.2)
BUN SERPL-MCNC: 23 MG/DL (ref 7–18)
BUN/CREAT SERPL: 20 (ref 6–20)
CALCIUM SERPL-MCNC: 9 MG/DL (ref 8.5–10.1)
CHLORIDE SERPL-SCNC: 104 MMOL/L (ref 98–107)
CO2 SERPL-SCNC: 31 MMOL/L (ref 21–32)
CREAT SERPL-MCNC: 1.17 MG/DL (ref 0.7–1.3)
DIFFERENTIAL METHOD BLD: ABNORMAL
EGFR (NO RACE VARIABLE) (RUSH/TITUS): 61 ML/MIN/1.73M2
EOSINOPHIL # BLD AUTO: 0.32 K/UL (ref 0–0.5)
EOSINOPHIL NFR BLD AUTO: 6 % (ref 1–4)
EOSINOPHIL NFR BLD MANUAL: 6 % (ref 1–4)
ERYTHROCYTE [DISTWIDTH] IN BLOOD BY AUTOMATED COUNT: 15.2 % (ref 11.5–14.5)
GLOBULIN SER-MCNC: 3 G/DL (ref 2–4)
GLUCOSE SERPL-MCNC: 108 MG/DL (ref 74–106)
HCT VFR BLD AUTO: 36.4 % (ref 40–54)
HGB BLD-MCNC: 11.6 G/DL (ref 13.5–18)
IMM GRANULOCYTES # BLD AUTO: 0.03 K/UL (ref 0–0.04)
IMM GRANULOCYTES NFR BLD: 0.6 % (ref 0–0.4)
LYMPHOCYTES # BLD AUTO: 1.85 K/UL (ref 1–4.8)
LYMPHOCYTES NFR BLD AUTO: 34.5 % (ref 27–41)
LYMPHOCYTES NFR BLD MANUAL: 30 % (ref 27–41)
MCH RBC QN AUTO: 31.4 PG (ref 27–31)
MCHC RBC AUTO-ENTMCNC: 31.9 G/DL (ref 32–36)
MCV RBC AUTO: 98.4 FL (ref 80–96)
MONOCYTES # BLD AUTO: 0.56 K/UL (ref 0–0.8)
MONOCYTES NFR BLD AUTO: 10.4 % (ref 2–6)
MONOCYTES NFR BLD MANUAL: 10 % (ref 2–6)
MPC BLD CALC-MCNC: 9.9 FL (ref 9.4–12.4)
MYELOCYTES NFR BLD MANUAL: 1 %
NEUTROPHILS # BLD AUTO: 2.58 K/UL (ref 1.8–7.7)
NEUTROPHILS NFR BLD AUTO: 48.1 % (ref 53–65)
NEUTS SEG NFR BLD MANUAL: 53 % (ref 50–62)
NRBC # BLD AUTO: 0 X10E3/UL
NRBC, AUTO (.00): 0 %
NT-PROBNP SERPL-MCNC: 678 PG/ML (ref 1–450)
OVALOCYTES BLD QL SMEAR: ABNORMAL
PLATELET # BLD AUTO: 130 K/UL (ref 150–400)
PLATELET MORPHOLOGY: ABNORMAL
POLYCHROMASIA BLD QL SMEAR: ABNORMAL
POTASSIUM SERPL-SCNC: 4.4 MMOL/L (ref 3.5–5.1)
PROT SERPL-MCNC: 6.2 G/DL (ref 6.4–8.2)
RBC # BLD AUTO: 3.7 M/UL (ref 4.6–6.2)
SODIUM SERPL-SCNC: 143 MMOL/L (ref 136–145)
TROPONIN I SERPL HS-MCNC: 35.2 PG/ML
TROPONIN I SERPL HS-MCNC: 35.7 PG/ML
WBC # BLD AUTO: 5.36 K/UL (ref 4.5–11)

## 2023-05-08 PROCEDURE — 93010 ELECTROCARDIOGRAM REPORT: CPT | Mod: ,,, | Performed by: INTERNAL MEDICINE

## 2023-05-08 PROCEDURE — 99285 EMERGENCY DEPT VISIT HI MDM: CPT | Mod: 25

## 2023-05-08 PROCEDURE — 83880 ASSAY OF NATRIURETIC PEPTIDE: CPT | Performed by: FAMILY MEDICINE

## 2023-05-08 PROCEDURE — 80053 COMPREHEN METABOLIC PANEL: CPT | Performed by: FAMILY MEDICINE

## 2023-05-08 PROCEDURE — 99284 PR EMERGENCY DEPT VISIT,LEVEL IV: ICD-10-PCS | Mod: ,,, | Performed by: EMERGENCY MEDICINE

## 2023-05-08 PROCEDURE — 84484 ASSAY OF TROPONIN QUANT: CPT | Performed by: EMERGENCY MEDICINE

## 2023-05-08 PROCEDURE — 93005 ELECTROCARDIOGRAM TRACING: CPT

## 2023-05-08 PROCEDURE — 99284 EMERGENCY DEPT VISIT MOD MDM: CPT | Mod: ,,, | Performed by: EMERGENCY MEDICINE

## 2023-05-08 PROCEDURE — 93010 EKG 12-LEAD: ICD-10-PCS | Mod: ,,, | Performed by: INTERNAL MEDICINE

## 2023-05-08 PROCEDURE — 84484 ASSAY OF TROPONIN QUANT: CPT | Performed by: FAMILY MEDICINE

## 2023-05-08 PROCEDURE — 25000003 PHARM REV CODE 250: Performed by: EMERGENCY MEDICINE

## 2023-05-08 PROCEDURE — 85025 COMPLETE CBC W/AUTO DIFF WBC: CPT | Performed by: FAMILY MEDICINE

## 2023-05-08 RX ORDER — ASPIRIN 325 MG
325 TABLET ORAL
Status: COMPLETED | OUTPATIENT
Start: 2023-05-08 | End: 2023-05-08

## 2023-05-08 RX ADMIN — ASPIRIN 325 MG ORAL TABLET 325 MG: 325 PILL ORAL at 08:05

## 2023-05-08 NOTE — ED TRIAGE NOTES
Presents to ED with complaints of chest pain and shortness of breath since about 1200 today.  Patient was recently hospitalized at [facility] for Respiratory Distress and was placed on the ventilator for several days.

## 2023-05-08 NOTE — ED PROVIDER NOTES
"Encounter Date: 5/8/2023    SCRIBE #1 NOTE: I, Cristina Jensen, am scribing for, and in the presence of,  Rajeev Ramirez DO. I have scribed the entire note.     History     Chief Complaint   Patient presents with    Chest Pain    Shortness of Breath     The patient is a 83 y.o. male who presents to the emergency department for chest pain and shortness of breath onset today after lunch. The patient also states that he woke up this morning "not feeling right." He believes that his chest pain was not caused by eating anything spicy today. The patient has a history of Afib, COPD, and hypertension. There are no other complaints in the ED at this time.    The history is provided by the patient and the spouse. No  was used.   Review of patient's allergies indicates:   Allergen Reactions    Sulfa (sulfonamide antibiotics) Hives     Past Medical History:   Diagnosis Date    COPD (chronic obstructive pulmonary disease)     Hypertension     Sarcoma      Past Surgical History:   Procedure Laterality Date    ABDOMINAL AORTIC ANEURYSM REPAIR      BACK SURGERY      FLEXOR TENDON REPAIR Right 11/4/2021    Procedure: REPAIR, TENDON, FLEXOR;  Surgeon: Dani Degroot MD;  Location: [facility];  Service: Orthopedics;  Laterality: Right;    SURGICAL REMOVAL OF SARCOMA      TRIGGER FINGER RELEASE Right 11/4/2021    Procedure: RELEASE, TRIGGER FINGER;  Surgeon: Dani Degroot MD;  Location: [facility];  Service: Orthopedics;  Laterality: Right;    VASCULAR SURGERY       History reviewed. No pertinent family history.  Social History     Tobacco Use    Smoking status: Former     Packs/day: 1.00     Years: 30.00     Pack years: 30.00     Types: Cigarettes     Passive exposure: Past    Smokeless tobacco: Current     Types: Chew   Substance Use Topics    Alcohol use: Yes    Drug use: Never     Review of Systems   Constitutional: Negative.    Eyes: Negative.    Respiratory:  Positive for shortness " of breath.    Cardiovascular:  Positive for chest pain.   All other systems reviewed and are negative.    Physical Exam     Initial Vitals [05/08/23 1635]   BP Pulse Resp Temp SpO2   (!) 140/85 91 (!) 21 97.4 °F (36.3 °C) 98 %      MAP       --         Physical Exam    Nursing note and vitals reviewed.  Constitutional: He appears well-developed and well-nourished.   HENT:   Head: Normocephalic and atraumatic.   Eyes: Conjunctivae and EOM are normal.   Cardiovascular:  Normal rate, regular rhythm and normal heart sounds.           No murmur heard.  No murmur or bruits.   Pulmonary/Chest: Breath sounds normal.   Abdominal: Abdomen is soft. Bowel sounds are normal.     Neurological: He is alert and oriented to person, place, and time.   Skin: Skin is warm and dry.   Psychiatric: He has a normal mood and affect. His behavior is normal. Judgment and thought content normal.       ED Course   Procedures  Labs Reviewed   COMPREHENSIVE METABOLIC PANEL - Abnormal; Notable for the following components:       Result Value    Glucose 108 (*)     BUN 23 (*)     Total Protein 6.2 (*)     Albumin 3.2 (*)     All other components within normal limits   NT-PRO NATRIURETIC PEPTIDE - Abnormal; Notable for the following components:    ProBNP 678 (*)     All other components within normal limits   CBC WITH DIFFERENTIAL - Abnormal; Notable for the following components:    RBC 3.70 (*)     Hemoglobin 11.6 (*)     Hematocrit 36.4 (*)     MCV 98.4 (*)     MCH 31.4 (*)     MCHC 31.9 (*)     RDW 15.2 (*)     Platelet Count 130 (*)     Neutrophils % 48.1 (*)     Monocytes % 10.4 (*)     Eosinophils % 6.0 (*)     Immature Granulocytes % 0.6 (*)     All other components within normal limits   MANUAL DIFFERENTIAL - Abnormal; Notable for the following components:    Monocytes, Man % 10 (*)     Eosinophils, Man % 6 (*)     Platelet Morphology Decreased (*)     All other components within normal limits   TROPONIN I - Normal   TROPONIN I - Normal    CBC W/ AUTO DIFFERENTIAL    Narrative:     The following orders were created for panel order CBC Auto Differential.  Procedure                               Abnormality         Status                     ---------                               -----------         ------                     CBC with Differential[[id number]]        Abnormal            Final result               Manual Differential[[id number]]          Abnormal            Final result                 Please view results for these tests on the individual orders.     EKG Readings: (Independently Interpreted)   Rhythm: Atrial Fibrillation. Heart Rate: 90.   Interpreted by Dr. Ramirez at 16:42.     Imaging Results              X-Ray Chest AP Portable (Final result)  Result time 05/08/23 16:57:22      Final result by José Antonio Deluca II, MD (05/08/23 16:57:22)                   Impression:      No definite acute findings.      Electronically signed by: José Antonio Deluca  Date:    05/08/2023  Time:    16:57               Narrative:    EXAMINATION:  XR CHEST AP PORTABLE    CLINICAL HISTORY:  Patient with chest pain shortness breath;    COMPARISON:  7 April 2023    TECHNIQUE:  XR CHEST AP PORTABLE    FINDINGS:  The heart and mediastinum are stable in size and configuration.  The pulmonary vascularity is normal in caliber.  There is linear right lower lung density similar to previous studies.  No definite new lung infiltrates, effusions, pneumothorax or other abnormality is demonstrated.                                       Medications   aspirin tablet 325 mg (325 mg Oral Given 5/8/23 2005)     Medical Decision Making:   Initial Assessment:   Patient comes in with chest pains that started this morning he had had spicy foods last night but in his history his had acute respiratory failure.  His chest pain progressed today until now this point it becomes worse upon ambulation and exercise.  And exertion.  Differential Diagnosis:   1--Chest pain  2. History of  respiratory distress and failure placed on a ventilator 3-atrial fib with good rate control    ED Management:  [facility]    Patient presents for emergent evaluation of acute chest pain that poses a threat to life and/or bodily function.    In the ED patient found to have acute chest pain.    I ordered labs and personally reviewed them.  Labs significant for negative troponin.  See ED course note  I ordered X-rays and personally reviewed them and reviewed the radiologist interpretation.  Xray significant for no acute abnormality.    I ordered EKG and personally reviewed it.  EKG significant for AFib controlled rate no ST elevation.      Discharge [facility]  The response to treatment was stable.    Patient was discharged in stable condition.  Detailed return precautions discussed.           Attending Attestation:           Physician Attestation for Scribe:  Physician Attestation Statement for Scribe #1: I, Rajeev Ramirez, DO, reviewed documentation, as scribed by Cristina Jensen in my presence, and it is both accurate and complete.           ED Course as of 05/08/23 2102   Mon May 08, 2023   1759 Sign out. Chest pain, SOB.  A-fib with normal rate.   [PK]   1959 Patient has some vague chest discomfort earlier today around 1:00 a.m..  He said he felt as if he had some gas in his abdomen but no vomiting and no constipation.  Patient has some chronic shortness of breath with history of COPD wears 2 L of oxygen at home but no increased sputum production or coughing.  No associated fever.  No leg swelling.  He was recently diagnosed with atrial fibrillation.  Patient was started on Eliquis recently for atrial fibrillation and was taken off of his 81 mg aspirin tablet.  Will give him a aspirin dose in the ER and started back on aspirin.  Will have ambulatory referral to Cardiology.  High sensitivity troponin is normal unlikely to be myocardial infarction.  EKG shows no ST elevation.  Patient does have atrial fibrillation with  controlled rate. [PK]      ED Course User Index  [PK] Haroon King MD                   Clinical Impression:   Final diagnoses:  [R06.02] SOB (shortness of breath)  [R07.9] Chest pain, unspecified type (Primary)  [I48.91] Atrial fibrillation, unspecified type        ED Disposition Condition    Discharge Stable          ED Prescriptions    None       Follow-up Information       Follow up With Specialties Details Why Contact Info    Jayant Castro MD Family Medicine, Emergency Medicine Go to  As needed [address] [facility]  [facility]  [place] [id number] [phone]               Haroon King MD  05/08/23 1267     3-point gait

## 2023-05-09 NOTE — DISCHARGE INSTRUCTIONS
Start taking aspirin 81 mg per day again    Follow-up with cardiologist and with primary care    Return to the ER if symptoms are worsening or new symptoms develop

## 2023-05-10 ENCOUNTER — DOCUMENT SCAN (OUTPATIENT)
Dept: HOME HEALTH SERVICES | Facility: HOSPITAL | Age: 83
End: 2023-05-10
Payer: MEDICARE

## 2023-05-15 ENCOUNTER — OFFICE VISIT (OUTPATIENT)
Dept: PULMONOLOGY | Facility: CLINIC | Age: 83
End: 2023-05-15
Payer: MEDICARE

## 2023-05-15 VITALS
RESPIRATION RATE: 16 BRPM | DIASTOLIC BLOOD PRESSURE: 70 MMHG | BODY MASS INDEX: 31.15 KG/M2 | HEIGHT: 72 IN | HEART RATE: 86 BPM | OXYGEN SATURATION: 94 % | SYSTOLIC BLOOD PRESSURE: 112 MMHG | WEIGHT: 230 LBS

## 2023-05-15 DIAGNOSIS — J44.9 CHRONIC OBSTRUCTIVE PULMONARY DISEASE, UNSPECIFIED COPD TYPE: Chronic | ICD-10-CM

## 2023-05-15 PROCEDURE — 3288F PR FALLS RISK ASSESSMENT DOCUMENTED: ICD-10-PCS | Mod: CPTII,,, | Performed by: INTERNAL MEDICINE

## 2023-05-15 PROCEDURE — 3078F DIAST BP <80 MM HG: CPT | Mod: CPTII,,, | Performed by: INTERNAL MEDICINE

## 2023-05-15 PROCEDURE — 1101F PR PT FALLS ASSESS DOC 0-1 FALLS W/OUT INJ PAST YR: ICD-10-PCS | Mod: CPTII,,, | Performed by: INTERNAL MEDICINE

## 2023-05-15 PROCEDURE — 1101F PT FALLS ASSESS-DOCD LE1/YR: CPT | Mod: CPTII,,, | Performed by: INTERNAL MEDICINE

## 2023-05-15 PROCEDURE — 3074F SYST BP LT 130 MM HG: CPT | Mod: CPTII,,, | Performed by: INTERNAL MEDICINE

## 2023-05-15 PROCEDURE — 1125F AMNT PAIN NOTED PAIN PRSNT: CPT | Mod: CPTII,,, | Performed by: INTERNAL MEDICINE

## 2023-05-15 PROCEDURE — 3074F PR MOST RECENT SYSTOLIC BLOOD PRESSURE < 130 MM HG: ICD-10-PCS | Mod: CPTII,,, | Performed by: INTERNAL MEDICINE

## 2023-05-15 PROCEDURE — 99215 OFFICE O/P EST HI 40 MIN: CPT | Mod: PBBFAC | Performed by: INTERNAL MEDICINE

## 2023-05-15 PROCEDURE — 1159F MED LIST DOCD IN RCRD: CPT | Mod: CPTII,,, | Performed by: INTERNAL MEDICINE

## 2023-05-15 PROCEDURE — 3078F PR MOST RECENT DIASTOLIC BLOOD PRESSURE < 80 MM HG: ICD-10-PCS | Mod: CPTII,,, | Performed by: INTERNAL MEDICINE

## 2023-05-15 PROCEDURE — 1125F PR PAIN SEVERITY QUANTIFIED, PAIN PRESENT: ICD-10-PCS | Mod: CPTII,,, | Performed by: INTERNAL MEDICINE

## 2023-05-15 PROCEDURE — 99214 PR OFFICE/OUTPT VISIT, EST, LEVL IV, 30-39 MIN: ICD-10-PCS | Mod: S$PBB,,, | Performed by: INTERNAL MEDICINE

## 2023-05-15 PROCEDURE — 3288F FALL RISK ASSESSMENT DOCD: CPT | Mod: CPTII,,, | Performed by: INTERNAL MEDICINE

## 2023-05-15 PROCEDURE — 1159F PR MEDICATION LIST DOCUMENTED IN MEDICAL RECORD: ICD-10-PCS | Mod: CPTII,,, | Performed by: INTERNAL MEDICINE

## 2023-05-15 PROCEDURE — 99214 OFFICE O/P EST MOD 30 MIN: CPT | Mod: S$PBB,,, | Performed by: INTERNAL MEDICINE

## 2023-05-15 NOTE — ASSESSMENT & PLAN NOTE
Patient was just recently in the hospital on the ventilator he is made significant improvement is currently what he thinks back to baseline he does complain of some chest wall pain when he takes a deep breath I told him to take some Motrin for that he is currently using budesonide nebulized albuterol nebulized,brezrti.  He feels like he is back to baseline will continue to watch him I do not believe he needs a trilogy at this time

## 2023-05-15 NOTE — PROGRESS NOTES
Subjective:       Patient ID: Nathan Hinton is a 83 y.o. male.    Chief Complaint: COPD    COPD  This is a chronic problem. The current episode started more than 1 year ago. The problem has been gradually improving. Pertinent negatives include no abdominal pain, arthralgias, chest pain, chills, congestion, headaches or rash. The symptoms are aggravated by exertion.   Past Medical History:   Diagnosis Date    COPD (chronic obstructive pulmonary disease)     Hypertension     Sarcoma      Past Surgical History:   Procedure Laterality Date    ABDOMINAL AORTIC ANEURYSM REPAIR      BACK SURGERY      FLEXOR TENDON REPAIR Right 11/4/2021    Procedure: REPAIR, TENDON, FLEXOR;  Surgeon: Dani Degroot MD;  Location: [facility];  Service: Orthopedics;  Laterality: Right;    SURGICAL REMOVAL OF SARCOMA      TRIGGER FINGER RELEASE Right 11/4/2021    Procedure: RELEASE, TRIGGER FINGER;  Surgeon: Dani Degroot MD;  Location: [facility];  Service: Orthopedics;  Laterality: Right;    VASCULAR SURGERY       No family history on file.  Review of patient's allergies indicates:   Allergen Reactions    Sulfa (sulfonamide antibiotics) Hives      Social History     Tobacco Use    Smoking status: Former     Packs/day: 1.00     Years: 30.00     Pack years: 30.00     Types: Cigarettes     Passive exposure: Past    Smokeless tobacco: Current     Types: Chew   Substance Use Topics    Alcohol use: Yes    Drug use: Never      Review of Systems   Constitutional:  Negative for chills, activity change and night sweats.   HENT:  Negative for congestion and ear pain.    Eyes:  Negative for redness and itching.   Cardiovascular:  Negative for chest pain and palpitations.   Musculoskeletal:  Negative for arthralgias and back pain.   Skin:  Negative for rash.   Gastrointestinal:  Negative for abdominal pain and abdominal distention.   Neurological:  Negative for dizziness and headaches.   Hematological:  Negative for adenopathy.  Does not bruise/bleed easily.   Psychiatric/Behavioral:  Negative for confusion. The patient is not nervous/anxious.      Objective:      Physical Exam   Constitutional: He is oriented to person, place, and time. He appears well-developed and well-nourished.   HENT:   Head: Normocephalic.   Nose: Nose normal.   Mouth/Throat: Oropharynx is clear and moist.   Neck: No JVD present. No thyromegaly present.   Cardiovascular: Normal rate, regular rhythm, normal heart sounds and intact distal pulses.   Pulmonary/Chest: Normal expansion, hyperinflation, symmetric chest wall expansion, effort normal and breath sounds normal.   Abdominal: Soft. Bowel sounds are normal.   Musculoskeletal:         General: Normal range of motion.      Cervical back: Normal range of motion and neck supple.   Lymphadenopathy: No supraclavicular adenopathy is present.     He has no cervical adenopathy.   Neurological: He is alert and oriented to person, place, and time. He has normal reflexes.   Skin: Skin is warm and dry.   Psychiatric: He has a normal mood and affect. His behavior is normal.   Personal Diagnostic Review  none pertinent    No flowsheet data found.      Assessment:       1. Chronic obstructive pulmonary disease, unspecified COPD type        Outpatient Encounter Medications as of 5/15/2023   Medication Sig Dispense Refill    albuterol sulfate 2.5 mg/0.5 mL Nebu Take 2.5 mg by nebulization every 4 (four) hours as needed (P.r.n. wheezing). Rescue 60 each 11    apixaban (ELIQUIS) 2.5 mg Tab Take 1 tablet (2.5 mg total) by mouth 2 (two) times daily. 120 tablet 3    aspirin (ECOTRIN) 81 MG EC tablet Take 81 mg by mouth once daily.      atorvastatin (LIPITOR) 80 MG tablet Take 80 mg by mouth.      budesonide (PULMICORT) 0.5 mg/2 mL nebulizer solution Take 2 mLs (0.5 mg total) by nebulization every 12 (twelve) hours. Controller 360 mL 3    budesonide-glycopyr-formoterol (BREZTRI AEROSPHERE) 160-9-4.8 mcg/actuation HFAA Inhale 2 puffs into  the lungs 2 (two) times a day. 10.7 g 11    budesonide-glycopyr-formoterol (BREZTRI AEROSPHERE) 160-9-4.8 mcg/actuation HFAA Inhale 2 puffs into the lungs 2 (two) times a day. 10.7 g 11    ciclopirox (PENLAC) 8 % Soln Apply topically nightly. 6.6 mL 11    HYDROcodone-acetaminophen (NORCO) 7.5-325 mg per tablet Take 1 tablet by mouth every 6 (six) hours as needed for Pain. 60 tablet 0    ipratropium (ATROVENT) 0.02 % nebulizer solution Take 2.5 mLs (500 mcg total) by nebulization every 8 (eight) hours. Rescue 675 mL 3    metoprolol succinate (TOPROL-XL) 25 MG 24 hr tablet Take 0.5 tablets (12.5 mg total) by mouth once daily. 15 tablet 11    multivit-min/ferrous fumarate (MULTI VITAMIN ORAL) Take by mouth.      omeprazole (PRILOSEC) 20 MG capsule Take 20 mg by mouth.      tacrolimus (PROTOPIC) 0.1 % ointment Apply topically 2 (two) times daily. 100 g 5    umeclidinium-vilanteroL (ANORO ELLIPTA) 62.5-25 mcg/actuation DsDv Inhale into the lungs. Controller      amlodipine besylate (AMLODIPINE ORAL) Take 5 mg by mouth Daily.      mirabegron (MYRBETRIQ) 25 mg Tb24 ER tablet Take 1 tablet (25 mg total) by mouth once daily. (Patient not taking: Reported on 5/15/2023) 30 tablet 11    [] predniSONE (DELTASONE) 10 MG tablet Take 5 tablets (50 mg total) by mouth once daily for 5 days, THEN 2.5 tablets (25 mg total) once daily for 7 days, THEN 1 tablet (10 mg total) once daily for 7 days, THEN 0.5 tablets (5 mg total) once daily for 7 days. 53 tablet 0    triamcinolone acetonide 0.1% (KENALOG) 0.1 % cream Apply topically 2 (two) times daily. (Patient not taking: Reported on 5/15/2023) 454 g 11     No facility-administered encounter medications on file as of 5/15/2023.     No orders of the defined types were placed in this encounter.      Plan:       Problem List Items Addressed This Visit          Pulmonary    COPD (chronic obstructive pulmonary disease) (Chronic)     Patient was just recently in the hospital on the  ventilator he is made significant improvement is currently what he thinks back to baseline he does complain of some chest wall pain when he takes a deep breath I told him to take some Motrin for that he is currently using budesonide nebulized albuterol nebulized,brezrti.  He feels like he is back to baseline will continue to watch him I do not believe he needs a trilogy at this time

## 2023-05-19 ENCOUNTER — DOCUMENT SCAN (OUTPATIENT)
Dept: HOME HEALTH SERVICES | Facility: HOSPITAL | Age: 83
End: 2023-05-19

## 2023-05-22 NOTE — SUBJECTIVE & OBJECTIVE
Interval History: intubated, sedated       Objective:     Vital Signs (Most Recent):  Temp: 97.8 °F (36.6 °C) (04/08/23 0700)  Pulse: 91 (04/08/23 1315)  Resp: 18 (04/08/23 1315)  BP: (!) 84/51 (04/08/23 1300)  SpO2: 100 % (04/08/23 1315)   Vital Signs (24h Range):  Temp:  [97.8 °F (36.6 °C)-99.8 °F (37.7 °C)] 97.8 °F (36.6 °C)  Pulse:  [] 91  Resp:  [15-23] 18  SpO2:  [95 %-100 %] 100 %  BP: ()/(47-78) 84/51     Weight: 96.6 kg (213 lb)  Body mass index is 26.62 kg/m².      Intake/Output Summary (Last 24 hours) at 4/8/2023 1337  Last data filed at 4/8/2023 0942  Gross per 24 hour   Intake 3091.08 ml   Output 1225 ml   Net 1866.08 ml       Physical Exam  Vitals reviewed.   Constitutional:       Appearance: He is obese.      Interventions: He is sedated and intubated.   HENT:      Right Ear: External ear normal.      Left Ear: External ear normal.      Mouth/Throat:      Mouth: Mucous membranes are moist.      Pharynx: Oropharynx is clear.   Eyes:      Extraocular Movements: Extraocular movements intact.      Conjunctiva/sclera: Conjunctivae normal.   Cardiovascular:      Rate and Rhythm: Normal rate and regular rhythm.      Pulses: Normal pulses.      Heart sounds: Normal heart sounds.   Pulmonary:      Effort: Pulmonary effort is normal. He is intubated.   Abdominal:      General: Abdomen is flat. Bowel sounds are normal.      Palpations: Abdomen is soft.   Musculoskeletal:         General: Normal range of motion.      Cervical back: Normal range of motion.   Skin:     General: Skin is warm and dry.      Capillary Refill: Capillary refill takes less than 2 seconds.     Review of Systems    Vents:  Vent Mode: A/C (04/08/23 0737)  Set Rate: 18 BPM (04/08/23 0737)  Vt Set: 500 mL (04/08/23 0737)  PEEP/CPAP: 5 cmH20 (04/08/23 0737)  Oxygen Concentration (%): 50 (04/08/23 0737)  Peak Airway Pressure: 26 cmH20 (04/08/23 0737)  Total Ve: 10 L/m (04/08/23 0737)  F/VT Ratio<105 (RSBI): (!) 42.37 (04/08/23  0737)    Lines/Drains/Airways       Drain  Duration                  NG/OG Tube 04/07/23 1035 Empire sump 16 Fr. Right mouth 1 day         Urethral Catheter 04/07/23 1000 Non-latex 16 Fr. 1 day              Airway  Duration                  Airway - Non-Surgical 04/07/23 0929 1 day              Peripheral Intravenous Line  Duration                  Peripheral IV - Single Lumen 04/07/23 0910 18 G Left Antecubital 1 day         Peripheral IV - Single Lumen 04/07/23 1100 20 G Left;Posterior Hand 1 day         Peripheral IV - Single Lumen 04/07/23 1700 20 G Posterior;Right Hand <1 day                    Significant Labs:    CBC/Anemia Profile:  Recent Labs   Lab 04/07/23  1003 04/07/23  1005 04/07/23  1320 04/07/23  1324 04/08/23  0308   WBC 6.16  --   --   --  4.25*   HGB 11.7*  --   --   --  11.1*   HCT 39.2*   < > 38 38 35.5*     --   --   --  138*   .5*  --   --   --  97.8*   RDW 14.3  --   --   --  14.2    < > = values in this interval not displayed.        Chemistries:  Recent Labs   Lab 04/07/23  1003 04/08/23  0308    141   K 4.9 4.8    102   CO2 39* 31   BUN 27* 41*   CREATININE 1.63* 1.97*   CALCIUM 9.6 9.0   ALBUMIN 3.1*  --    PROT 7.4  --    BILITOT 0.7  --    ALKPHOS 85  --    ALT 25  --    AST 25  --        All pertinent labs within the past 24 hours have been reviewed.    Significant Imaging:  I have reviewed all pertinent imaging results/findings within the past 24 hours.   none

## 2023-05-24 ENCOUNTER — HOSPITAL ENCOUNTER (OUTPATIENT)
Dept: RADIOLOGY | Facility: HOSPITAL | Age: 83
Discharge: HOME | End: 2023-05-24
Attending: INTERNAL MEDICINE
Payer: OTHER GOVERNMENT

## 2023-05-24 ENCOUNTER — OFFICE VISIT (OUTPATIENT)
Dept: CARDIOLOGY | Facility: CLINIC | Age: 83
End: 2023-05-24
Payer: OTHER GOVERNMENT

## 2023-05-24 VITALS
HEIGHT: 72 IN | RESPIRATION RATE: 18 BRPM | SYSTOLIC BLOOD PRESSURE: 126 MMHG | HEART RATE: 86 BPM | WEIGHT: 231 LBS | BODY MASS INDEX: 31.29 KG/M2 | DIASTOLIC BLOOD PRESSURE: 72 MMHG

## 2023-05-24 DIAGNOSIS — R94.31 NONSPECIFIC ABNORMAL ELECTROCARDIOGRAM (ECG) (EKG): ICD-10-CM

## 2023-05-24 DIAGNOSIS — J44.9 CHRONIC OBSTRUCTIVE PULMONARY DISEASE, UNSPECIFIED COPD TYPE: ICD-10-CM

## 2023-05-24 DIAGNOSIS — N18.30 STAGE 3 CHRONIC KIDNEY DISEASE, UNSPECIFIED WHETHER STAGE 3A OR 3B CKD: Chronic | ICD-10-CM

## 2023-05-24 DIAGNOSIS — I48.91 ATRIAL FIBRILLATION, UNSPECIFIED TYPE: ICD-10-CM

## 2023-05-24 DIAGNOSIS — J44.9 CHRONIC OBSTRUCTIVE PULMONARY DISEASE, UNSPECIFIED COPD TYPE: Chronic | ICD-10-CM

## 2023-05-24 DIAGNOSIS — I10 PRIMARY HYPERTENSION: ICD-10-CM

## 2023-05-24 DIAGNOSIS — E78.5 HYPERLIPIDEMIA, UNSPECIFIED HYPERLIPIDEMIA TYPE: Chronic | ICD-10-CM

## 2023-05-24 DIAGNOSIS — I48.91 ATRIAL FIBRILLATION, UNSPECIFIED TYPE: Primary | Chronic | ICD-10-CM

## 2023-05-24 DIAGNOSIS — R07.9 CHEST PAIN, UNSPECIFIED TYPE: ICD-10-CM

## 2023-05-24 DIAGNOSIS — I10 PRIMARY HYPERTENSION: Chronic | ICD-10-CM

## 2023-05-24 PROCEDURE — 71046 X-RAY EXAM CHEST 2 VIEWS: CPT | Mod: TC

## 2023-05-24 PROCEDURE — 99204 OFFICE O/P NEW MOD 45 MIN: CPT | Mod: S$PBB,,, | Performed by: INTERNAL MEDICINE

## 2023-05-24 PROCEDURE — 99204 PR OFFICE/OUTPT VISIT, NEW, LEVL IV, 45-59 MIN: ICD-10-PCS | Mod: S$PBB,,, | Performed by: INTERNAL MEDICINE

## 2023-05-24 PROCEDURE — 93005 ELECTROCARDIOGRAM TRACING: CPT | Mod: PBBFAC | Performed by: INTERNAL MEDICINE

## 2023-05-24 PROCEDURE — 99214 OFFICE O/P EST MOD 30 MIN: CPT | Mod: PBBFAC,25 | Performed by: INTERNAL MEDICINE

## 2023-05-24 PROCEDURE — 93010 ELECTROCARDIOGRAM REPORT: CPT | Mod: S$PBB,,, | Performed by: INTERNAL MEDICINE

## 2023-05-24 PROCEDURE — 93010 EKG 12-LEAD: ICD-10-PCS | Mod: S$PBB,,, | Performed by: INTERNAL MEDICINE

## 2023-05-24 PROCEDURE — 71046 X-RAY EXAM CHEST 2 VIEWS: CPT | Mod: 26,,, | Performed by: RADIOLOGY

## 2023-05-24 PROCEDURE — 71046 XR CHEST PA AND LATERAL: ICD-10-PCS | Mod: 26,,, | Performed by: RADIOLOGY

## 2023-05-30 NOTE — PROGRESS NOTES
PCP: Jayant Castro MD    Referring Provider:     Subjective:   Nathan Hinton is a 83 y.o. male with hx of HTN, HLD, COPD, CKD, and a-fib who presents for chest pain and afib.  Referred by Dr. King in the ED.     Fhx:  Family History   Problem Relation Age of Onset    No Known Problems Mother     No Known Problems Father      Shx:   Social History     Socioeconomic History    Marital status:    Tobacco Use    Smoking status: Former     Packs/day: 1.00     Years: 30.00     Pack years: 30.00     Types: Cigarettes     Passive exposure: Past    Smokeless tobacco: Current     Types: Chew   Substance and Sexual Activity    Alcohol use: Yes    Drug use: Never    Sexual activity: Not Currently     Social Determinants of Health     Financial Resource Strain: Low Risk     Difficulty of Paying Living Expenses: Not hard at all   Food Insecurity: No Food Insecurity    Worried About Running Out of Food in the Last Year: Never true    Ran Out of Food in the Last Year: Never true   Transportation Needs: No Transportation Needs    Lack of Transportation (Medical): No    Lack of Transportation (Non-Medical): No   Physical Activity: Inactive    Days of Exercise per Week: 0 days    Minutes of Exercise per Session: 0 min   Stress: No Stress Concern Present    Feeling of Stress : Not at all   Social Connections: Moderately Integrated    Frequency of Communication with Friends and Family: More than three times a week    Frequency of Social Gatherings with Friends and Family: More than three times a week    Attends Scientologist Services: More than 4 times per year    Active Member of Clubs or Organizations: Yes    Attends Club or Organization Meetings: More than 4 times per year    Marital Status:    Housing Stability: Low Risk     Unable to Pay for Housing in the Last Year: No    Number of Places Lived in the Last Year: 1    Unstable Housing in the Last Year: No       EKG   5/24/23--a-fib, 87 bpm  5/8/23--a-fib, 90  bpm    Echo    4/7/23--Interpretation Summary  · The left ventricle is normal in size with moderately decreased systolic function.  · The estimated ejection fraction is 35-40%.  · There are segmental left ventricular wall motion abnormalities.  · Atrial fibrillation observed.  · Mild right ventricular enlargement with mildly reduced right ventricular systolic function.  · Mild-to-moderate mitral regurgitation.  · Mild tricuspid regurgitation.  · The estimated PA systolic pressure is 28 mmHg.  · Intermediate central venous pressure (8 mmHg).  · Trivial pericardial effusion.    9/9/17--Normal LVSF and size, EF 50-55%. Concentric LVH. Trace TR with estimated RVSP 28 mmHg. Impaired LV relaxation.         Lab Results   Component Value Date     05/08/2023    K 4.4 05/08/2023     05/08/2023    CO2 31 05/08/2023    BUN 23 (H) 05/08/2023    CREATININE 1.17 05/08/2023    CALCIUM 9.0 05/08/2023    ANIONGAP 12 05/08/2023    ESTGFRAFRICA 63 08/24/2020    EGFRNONAA 52 08/24/2020       LDL 93 on 8/3/2020    Lab Results   Component Value Date    WBC 5.36 05/08/2023    HGB 11.6 (L) 05/08/2023    HCT 36.4 (L) 05/08/2023    MCV 98.4 (H) 05/08/2023     (L) 05/08/2023           Current Outpatient Medications:     albuterol sulfate 2.5 mg/0.5 mL Nebu, Take 2.5 mg by nebulization every 4 (four) hours as needed (P.r.n. wheezing). Rescue, Disp: 60 each, Rfl: 11    apixaban (ELIQUIS) 2.5 mg Tab, Take 1 tablet (2.5 mg total) by mouth 2 (two) times daily., Disp: 120 tablet, Rfl: 3    aspirin (ECOTRIN) 81 MG EC tablet, Take 81 mg by mouth once daily., Disp: , Rfl:     atorvastatin (LIPITOR) 40 MG tablet, Take 40 mg by mouth every evening., Disp: , Rfl:     budesonide (PULMICORT) 0.5 mg/2 mL nebulizer solution, Take 2 mLs (0.5 mg total) by nebulization every 12 (twelve) hours. Controller, Disp: 360 mL, Rfl: 3    budesonide-glycopyr-formoterol (BREZTRI AEROSPHERE) 160-9-4.8 mcg/actuation HFAA, Inhale 2 puffs into the lungs 2  (two) times a day., Disp: 10.7 g, Rfl: 11    HYDROcodone-acetaminophen (NORCO) 7.5-325 mg per tablet, Take 1 tablet by mouth every 6 (six) hours as needed for Pain., Disp: 60 tablet, Rfl: 0    ipratropium (ATROVENT) 0.02 % nebulizer solution, Take 2.5 mLs (500 mcg total) by nebulization every 8 (eight) hours. Rescue, Disp: 675 mL, Rfl: 3    metoprolol succinate (TOPROL-XL) 25 MG 24 hr tablet, Take 0.5 tablets (12.5 mg total) by mouth once daily., Disp: 15 tablet, Rfl: 11    mirabegron (MYRBETRIQ) 25 mg Tb24 ER tablet, Take 1 tablet (25 mg total) by mouth once daily., Disp: 30 tablet, Rfl: 11    multivit-min/ferrous fumarate (MULTI VITAMIN ORAL), Take by mouth., Disp: , Rfl:     omeprazole (PRILOSEC) 20 MG capsule, Take 20 mg by mouth., Disp: , Rfl:     umeclidinium-vilanteroL (ANORO ELLIPTA) 62.5-25 mcg/actuation DsDv, Inhale into the lungs. Controller, Disp: , Rfl:     amlodipine besylate (AMLODIPINE ORAL), Take 5 mg by mouth Daily., Disp: , Rfl:     ciclopirox (PENLAC) 8 % Soln, Apply topically nightly. (Patient not taking: Reported on 5/24/2023), Disp: 6.6 mL, Rfl: 11  Medications reviewed and reconciled.    Review of Systems   Respiratory:  Positive for shortness of breath.    Cardiovascular:  Positive for leg swelling. Negative for chest pain and palpitations.   Neurological:  Negative for loss of consciousness.         Objective:   /72 (BP Location: Left arm, Patient Position: Sitting)   Pulse 86   Resp 18   Ht 6' (1.829 m)   Wt 104.8 kg (231 lb)   BMI 31.33 kg/m²     Physical Exam  Vitals reviewed.   Constitutional:       Appearance: Normal appearance.      Comments: In wheelchair, on O2   HENT:      Head: Normocephalic and atraumatic.   Neck:      Vascular: No carotid bruit or JVD.   Cardiovascular:      Rate and Rhythm: Normal rate. Rhythm irregularly irregular.      Pulses: Normal pulses.           Radial pulses are 2+ on the right side and 2+ on the left side.      Heart sounds: Normal heart  sounds. No murmur heard.  Pulmonary:      Effort: Pulmonary effort is normal.      Breath sounds: Decreased breath sounds present.   Musculoskeletal:      Right lower leg: No edema.      Left lower leg: No edema.   Skin:     General: Skin is warm and dry.   Neurological:      Mental Status: He is alert and oriented to person, place, and time.         Assessment:     1. Atrial fibrillation, unspecified type  EKG 12-lead    EKG 12-lead    X-Ray Chest PA And Lateral     Easter weekend, 3 days on vent      2. Primary hypertension  X-Ray Chest PA And Lateral      3. Chronic obstructive pulmonary disease, unspecified COPD type  X-Ray Chest PA And Lateral    on home/ continuous O2      4. Nonspecific abnormal electrocardiogram (ECG) (EKG)        5. Hyperlipidemia, unspecified hyperlipidemia type        6. Stage 3 chronic kidney disease, unspecified whether stage 3a or 3b CKD              Plan:   Echo in 6 months  Cxr- pa/ lat  Continue Eliquis and Toprol  F/u in 6 months.

## 2023-06-05 PROBLEM — E78.5 HYPERLIPIDEMIA: Chronic | Status: ACTIVE | Noted: 2023-06-05

## 2023-06-05 PROBLEM — R94.31 NONSPECIFIC ABNORMAL ELECTROCARDIOGRAM (ECG) (EKG): Status: ACTIVE | Noted: 2023-06-05

## 2023-06-19 ENCOUNTER — EXTERNAL HOME HEALTH (OUTPATIENT)
Dept: HOME HEALTH SERVICES | Facility: HOSPITAL | Age: 83
End: 2023-06-19

## 2023-06-27 DIAGNOSIS — J44.9 CHRONIC OBSTRUCTIVE PULMONARY DISEASE, UNSPECIFIED COPD TYPE: Primary | ICD-10-CM

## 2023-06-29 ENCOUNTER — DOCUMENT SCAN (OUTPATIENT)
Dept: HOME HEALTH SERVICES | Facility: HOSPITAL | Age: 83
End: 2023-06-29

## 2023-07-12 ENCOUNTER — HOSPITAL ENCOUNTER (OUTPATIENT)
Facility: HOSPITAL | Age: 83
Discharge: HOME | End: 2023-07-14
Attending: FAMILY MEDICINE | Admitting: FAMILY MEDICINE
Payer: OTHER GOVERNMENT

## 2023-07-12 DIAGNOSIS — J44.1 CHRONIC OBSTRUCTIVE PULMONARY DISEASE WITH ACUTE EXACERBATION: ICD-10-CM

## 2023-07-12 DIAGNOSIS — I48.11 LONGSTANDING PERSISTENT ATRIAL FIBRILLATION: Primary | ICD-10-CM

## 2023-07-12 DIAGNOSIS — R07.9 CHEST PAIN: ICD-10-CM

## 2023-07-12 DIAGNOSIS — I10 PRIMARY HYPERTENSION: ICD-10-CM

## 2023-07-12 DIAGNOSIS — I50.22 CHRONIC SYSTOLIC HEART FAILURE: ICD-10-CM

## 2023-07-12 DIAGNOSIS — R06.02 SHORTNESS OF BREATH: ICD-10-CM

## 2023-07-12 LAB
ALBUMIN SERPL BCP-MCNC: 3.4 G/DL (ref 3.5–5)
ALBUMIN/GLOB SERPL: 1.1 {RATIO}
ALP SERPL-CCNC: 97 U/L (ref 45–115)
ALT SERPL W P-5'-P-CCNC: 18 U/L (ref 16–61)
ANION GAP SERPL CALCULATED.3IONS-SCNC: 13 MMOL/L (ref 7–16)
AST SERPL W P-5'-P-CCNC: 20 U/L (ref 15–37)
BASOPHILS # BLD AUTO: 0.03 K/UL (ref 0–0.2)
BASOPHILS NFR BLD AUTO: 0.5 % (ref 0–1)
BILIRUB SERPL-MCNC: 0.6 MG/DL (ref ?–1.2)
BUN SERPL-MCNC: 16 MG/DL (ref 7–18)
BUN/CREAT SERPL: 13 (ref 6–20)
CALCIUM SERPL-MCNC: 9.1 MG/DL (ref 8.5–10.1)
CHLORIDE SERPL-SCNC: 98 MMOL/L (ref 98–107)
CO2 SERPL-SCNC: 33 MMOL/L (ref 21–32)
CREAT SERPL-MCNC: 1.2 MG/DL (ref 0.7–1.3)
DIFFERENTIAL METHOD BLD: ABNORMAL
EGFR (NO RACE VARIABLE) (RUSH/TITUS): 60 ML/MIN/1.73M2
EOSINOPHIL # BLD AUTO: 0.53 K/UL (ref 0–0.5)
EOSINOPHIL NFR BLD AUTO: 8 % (ref 1–4)
ERYTHROCYTE [DISTWIDTH] IN BLOOD BY AUTOMATED COUNT: 13.4 % (ref 11.5–14.5)
GLOBULIN SER-MCNC: 3.2 G/DL (ref 2–4)
GLUCOSE SERPL-MCNC: 91 MG/DL (ref 74–106)
HCT VFR BLD AUTO: 39.9 % (ref 40–54)
HGB BLD-MCNC: 12.8 G/DL (ref 13.5–18)
IMM GRANULOCYTES # BLD AUTO: 0.01 K/UL (ref 0–0.04)
IMM GRANULOCYTES NFR BLD: 0.2 % (ref 0–0.4)
LYMPHOCYTES # BLD AUTO: 1.7 K/UL (ref 1–4.8)
LYMPHOCYTES NFR BLD AUTO: 25.6 % (ref 27–41)
MCH RBC QN AUTO: 31.1 PG (ref 27–31)
MCHC RBC AUTO-ENTMCNC: 32.1 G/DL (ref 32–36)
MCV RBC AUTO: 97.1 FL (ref 80–96)
MONOCYTES # BLD AUTO: 0.89 K/UL (ref 0–0.8)
MONOCYTES NFR BLD AUTO: 13.4 % (ref 2–6)
MPC BLD CALC-MCNC: 9.9 FL (ref 9.4–12.4)
NEUTROPHILS # BLD AUTO: 3.47 K/UL (ref 1.8–7.7)
NEUTROPHILS NFR BLD AUTO: 52.3 % (ref 53–65)
NRBC # BLD AUTO: 0 X10E3/UL
NRBC, AUTO (.00): 0 %
NT-PROBNP SERPL-MCNC: 565 PG/ML (ref 1–450)
PLATELET # BLD AUTO: 205 K/UL (ref 150–400)
POTASSIUM SERPL-SCNC: 4.5 MMOL/L (ref 3.5–5.1)
PROT SERPL-MCNC: 6.6 G/DL (ref 6.4–8.2)
RBC # BLD AUTO: 4.11 M/UL (ref 4.6–6.2)
SARS-COV-2 RDRP RESP QL NAA+PROBE: NEGATIVE
SODIUM SERPL-SCNC: 139 MMOL/L (ref 136–145)
TROPONIN I SERPL DL<=0.01 NG/ML-MCNC: 52.1 PG/ML
WBC # BLD AUTO: 6.63 K/UL (ref 4.5–11)

## 2023-07-12 PROCEDURE — 87149 DNA/RNA DIRECT PROBE: CPT

## 2023-07-12 PROCEDURE — 99285 PR EMERGENCY DEPT VISIT,LEVEL V: ICD-10-PCS | Mod: ,,, | Performed by: FAMILY MEDICINE

## 2023-07-12 PROCEDURE — 93005 ELECTROCARDIOGRAM TRACING: CPT

## 2023-07-12 PROCEDURE — 27000221 HC OXYGEN, UP TO 24 HOURS

## 2023-07-12 PROCEDURE — 25000003 PHARM REV CODE 250: Performed by: FAMILY MEDICINE

## 2023-07-12 PROCEDURE — 94640 AIRWAY INHALATION TREATMENT: CPT

## 2023-07-12 PROCEDURE — 99222 PR INITIAL HOSPITAL CARE,LEVL II: ICD-10-PCS | Mod: ,,, | Performed by: FAMILY MEDICINE

## 2023-07-12 PROCEDURE — 87635 SARS-COV-2 COVID-19 AMP PRB: CPT | Performed by: FAMILY MEDICINE

## 2023-07-12 PROCEDURE — 80053 COMPREHEN METABOLIC PANEL: CPT | Performed by: FAMILY MEDICINE

## 2023-07-12 PROCEDURE — 87077 CULTURE AEROBIC IDENTIFY: CPT

## 2023-07-12 PROCEDURE — 99900035 HC TECH TIME PER 15 MIN (STAT)

## 2023-07-12 PROCEDURE — 99900031 HC PATIENT EDUCATION (STAT)

## 2023-07-12 PROCEDURE — 25000242 PHARM REV CODE 250 ALT 637 W/ HCPCS: Performed by: FAMILY MEDICINE

## 2023-07-12 PROCEDURE — 93010 ELECTROCARDIOGRAM REPORT: CPT | Mod: ,,, | Performed by: INTERNAL MEDICINE

## 2023-07-12 PROCEDURE — 63600175 PHARM REV CODE 636 W HCPCS: Performed by: FAMILY MEDICINE

## 2023-07-12 PROCEDURE — 93010 EKG 12-LEAD: ICD-10-PCS | Mod: ,,, | Performed by: INTERNAL MEDICINE

## 2023-07-12 PROCEDURE — G0378 HOSPITAL OBSERVATION PER HR: HCPCS

## 2023-07-12 PROCEDURE — 85025 COMPLETE CBC W/AUTO DIFF WBC: CPT | Performed by: FAMILY MEDICINE

## 2023-07-12 PROCEDURE — 96375 TX/PRO/DX INJ NEW DRUG ADDON: CPT

## 2023-07-12 PROCEDURE — 84484 ASSAY OF TROPONIN QUANT: CPT | Performed by: FAMILY MEDICINE

## 2023-07-12 PROCEDURE — 83880 ASSAY OF NATRIURETIC PEPTIDE: CPT | Performed by: FAMILY MEDICINE

## 2023-07-12 PROCEDURE — 94761 N-INVAS EAR/PLS OXIMETRY MLT: CPT

## 2023-07-12 PROCEDURE — 99222 1ST HOSP IP/OBS MODERATE 55: CPT | Mod: ,,, | Performed by: FAMILY MEDICINE

## 2023-07-12 PROCEDURE — 99285 EMERGENCY DEPT VISIT HI MDM: CPT | Mod: 25

## 2023-07-12 PROCEDURE — 99285 EMERGENCY DEPT VISIT HI MDM: CPT | Mod: ,,, | Performed by: FAMILY MEDICINE

## 2023-07-12 PROCEDURE — 96365 THER/PROPH/DIAG IV INF INIT: CPT

## 2023-07-12 RX ORDER — POLYETHYLENE GLYCOL 3350 17 G/17G
17 POWDER, FOR SOLUTION ORAL DAILY
Status: DISCONTINUED | OUTPATIENT
Start: 2023-07-13 | End: 2023-07-14 | Stop reason: HOSPADM

## 2023-07-12 RX ORDER — AMLODIPINE BESYLATE 5 MG/1
5 TABLET ORAL DAILY
Status: DISCONTINUED | OUTPATIENT
Start: 2023-07-13 | End: 2023-07-14 | Stop reason: HOSPADM

## 2023-07-12 RX ORDER — PANTOPRAZOLE SODIUM 40 MG/1
40 TABLET, DELAYED RELEASE ORAL DAILY
Status: DISCONTINUED | OUTPATIENT
Start: 2023-07-13 | End: 2023-07-14 | Stop reason: HOSPADM

## 2023-07-12 RX ORDER — TALC
9 POWDER (GRAM) TOPICAL NIGHTLY PRN
Status: DISCONTINUED | OUTPATIENT
Start: 2023-07-12 | End: 2023-07-14 | Stop reason: HOSPADM

## 2023-07-12 RX ORDER — DEXAMETHASONE SODIUM PHOSPHATE 4 MG/ML
8 INJECTION, SOLUTION INTRA-ARTICULAR; INTRALESIONAL; INTRAMUSCULAR; INTRAVENOUS; SOFT TISSUE EVERY 24 HOURS
Status: DISCONTINUED | OUTPATIENT
Start: 2023-07-13 | End: 2023-07-14 | Stop reason: HOSPADM

## 2023-07-12 RX ORDER — ALBUTEROL SULFATE 90 UG/1
AEROSOL, METERED RESPIRATORY (INHALATION)
COMMUNITY
Start: 2023-04-03

## 2023-07-12 RX ORDER — LEVALBUTEROL INHALATION SOLUTION 1.25 MG/3ML
1.25 SOLUTION RESPIRATORY (INHALATION)
Status: COMPLETED | OUTPATIENT
Start: 2023-07-12 | End: 2023-07-12

## 2023-07-12 RX ORDER — ACETAMINOPHEN 325 MG/1
650 TABLET ORAL EVERY 4 HOURS PRN
Status: DISCONTINUED | OUTPATIENT
Start: 2023-07-12 | End: 2023-07-14 | Stop reason: HOSPADM

## 2023-07-12 RX ORDER — IPRATROPIUM BROMIDE AND ALBUTEROL SULFATE 2.5; .5 MG/3ML; MG/3ML
3 SOLUTION RESPIRATORY (INHALATION) EVERY 6 HOURS
Status: DISCONTINUED | OUTPATIENT
Start: 2023-07-12 | End: 2023-07-14 | Stop reason: HOSPADM

## 2023-07-12 RX ORDER — ATORVASTATIN CALCIUM 40 MG/1
40 TABLET, FILM COATED ORAL NIGHTLY
Status: DISCONTINUED | OUTPATIENT
Start: 2023-07-12 | End: 2023-07-14 | Stop reason: HOSPADM

## 2023-07-12 RX ORDER — TRAZODONE HYDROCHLORIDE 50 MG/1
50 TABLET ORAL NIGHTLY PRN
Status: DISCONTINUED | OUTPATIENT
Start: 2023-07-12 | End: 2023-07-14 | Stop reason: HOSPADM

## 2023-07-12 RX ORDER — SODIUM CHLORIDE 0.9 % (FLUSH) 0.9 %
10 SYRINGE (ML) INJECTION
Status: DISCONTINUED | OUTPATIENT
Start: 2023-07-12 | End: 2023-07-14 | Stop reason: HOSPADM

## 2023-07-12 RX ORDER — METHYLPREDNISOLONE SOD SUCC 125 MG
125 VIAL (EA) INJECTION
Status: COMPLETED | OUTPATIENT
Start: 2023-07-12 | End: 2023-07-12

## 2023-07-12 RX ORDER — OXYBUTYNIN CHLORIDE 5 MG/1
5 TABLET, EXTENDED RELEASE ORAL DAILY
Status: DISCONTINUED | OUTPATIENT
Start: 2023-07-13 | End: 2023-07-14 | Stop reason: HOSPADM

## 2023-07-12 RX ORDER — FUROSEMIDE 10 MG/ML
20 INJECTION INTRAMUSCULAR; INTRAVENOUS
Status: COMPLETED | OUTPATIENT
Start: 2023-07-12 | End: 2023-07-12

## 2023-07-12 RX ORDER — BUDESONIDE 0.5 MG/2ML
0.5 INHALANT ORAL EVERY 12 HOURS
Status: DISCONTINUED | OUTPATIENT
Start: 2023-07-12 | End: 2023-07-13

## 2023-07-12 RX ORDER — ASPIRIN 81 MG/1
81 TABLET ORAL DAILY
Status: DISCONTINUED | OUTPATIENT
Start: 2023-07-13 | End: 2023-07-14 | Stop reason: HOSPADM

## 2023-07-12 RX ORDER — HYDROCODONE BITARTRATE AND ACETAMINOPHEN 7.5; 325 MG/1; MG/1
1 TABLET ORAL EVERY 6 HOURS PRN
Status: DISCONTINUED | OUTPATIENT
Start: 2023-07-12 | End: 2023-07-14 | Stop reason: HOSPADM

## 2023-07-12 RX ORDER — HYDRALAZINE HYDROCHLORIDE 20 MG/ML
10 INJECTION INTRAMUSCULAR; INTRAVENOUS EVERY 6 HOURS PRN
Status: DISCONTINUED | OUTPATIENT
Start: 2023-07-12 | End: 2023-07-14 | Stop reason: HOSPADM

## 2023-07-12 RX ORDER — ONDANSETRON 2 MG/ML
8 INJECTION INTRAMUSCULAR; INTRAVENOUS EVERY 6 HOURS PRN
Status: DISCONTINUED | OUTPATIENT
Start: 2023-07-12 | End: 2023-07-14 | Stop reason: HOSPADM

## 2023-07-12 RX ADMIN — METHYLPREDNISOLONE SODIUM SUCCINATE 125 MG: 125 INJECTION, POWDER, FOR SOLUTION INTRAMUSCULAR; INTRAVENOUS at 03:07

## 2023-07-12 RX ADMIN — FUROSEMIDE 20 MG: 10 INJECTION, SOLUTION INTRAMUSCULAR; INTRAVENOUS at 05:07

## 2023-07-12 RX ADMIN — IPRATROPIUM BROMIDE AND ALBUTEROL SULFATE 3 ML: .5; 3 SOLUTION RESPIRATORY (INHALATION) at 07:07

## 2023-07-12 RX ADMIN — ATORVASTATIN CALCIUM 40 MG: 40 TABLET, FILM COATED ORAL at 09:07

## 2023-07-12 RX ADMIN — LEVALBUTEROL HYDROCHLORIDE 1.25 MG: 1.25 SOLUTION RESPIRATORY (INHALATION) at 03:07

## 2023-07-12 RX ADMIN — AZITHROMYCIN DIHYDRATE 500 MG: 500 INJECTION, POWDER, LYOPHILIZED, FOR SOLUTION INTRAVENOUS at 07:07

## 2023-07-12 RX ADMIN — APIXABAN 2.5 MG: 2.5 TABLET, FILM COATED ORAL at 09:07

## 2023-07-12 RX ADMIN — BUDESONIDE 0.5 MG: 0.5 SUSPENSION RESPIRATORY (INHALATION) at 08:07

## 2023-07-12 NOTE — ASSESSMENT & PLAN NOTE
Patient with Persistent (7 days or more) atrial fibrillation which is controlled currently with Beta Blocker. Patient is currently in atrial fibrillation.DUMAN2BQEv Score: 2. HASBLED Score: 2. Anticoagulation indicated. Anticoagulation done with Eliquis.

## 2023-07-12 NOTE — ASSESSMENT & PLAN NOTE
Cardiomegaly and interstitial abnormalities on CXR consistent with previous. pBNP 565 on admission, decreased from prior. Appears euvolemic.    Continue metoprolol due to a-fib. Lasix IV given prior to admission.    - strict I/O  - daily weights  - repeat echocardiogram pending    Echo    Result Date: 4/9/2023  · The left ventricle is normal in size with moderately decreased systolic   function.  · The estimated ejection fraction is 35-40%.  · There are segmental left ventricular wall motion abnormalities.  · Atrial fibrillation observed.  · Mild right ventricular enlargement with mildly reduced right ventricular   systolic function.  · Mild-to-moderate mitral regurgitation.  · Mild tricuspid regurgitation.  · The estimated PA systolic pressure is 28 mmHg.  · Intermediate central venous pressure (8 mmHg).  · Trivial pericardial effusion.

## 2023-07-12 NOTE — ED PROVIDER NOTES
Encounter Date: 7/12/2023    SCRIBE #1 NOTE: I, Lucille Vásquez, am scribing for, and in the presence of,  Rajeev Ramirez DO. I have scribed the entire note.     History     Chief Complaint   Patient presents with    Shortness of Breath     Pt c/o SOB. Has hx of COPD and wears 2L O2 at home continuous. States he has been more SOB than usual.     This is an 82 y/o white male,who presents to the ED with complaints of SOB. He has a known hx of COPD and wears 2 liters of home O2. There is no Hx of CP voiced while in the ED. There is audibile wheezing noted while in the ED. There are no other complaints/pain in the ED at this time. He has a known hx of Afib, COPD, HTN and sarcoma. There is no surgical Hx noted. He is a former smoker.     The history is provided by the patient and a relative. No  was used.   Review of patient's allergies indicates:   Allergen Reactions    Sulfa (sulfonamide antibiotics) Hives     Past Medical History:   Diagnosis Date    Atrial fibrillation     COPD (chronic obstructive pulmonary disease)     Hypertension     Sarcoma      Past Surgical History:   Procedure Laterality Date    ABDOMINAL AORTIC ANEURYSM REPAIR      BACK SURGERY      FLEXOR TENDON REPAIR Right 11/4/2021    Procedure: REPAIR, TENDON, FLEXOR;  Surgeon: Dani Degroot MD;  Location: [facility];  Service: Orthopedics;  Laterality: Right;    SURGICAL REMOVAL OF SARCOMA      TRIGGER FINGER RELEASE Right 11/4/2021    Procedure: RELEASE, TRIGGER FINGER;  Surgeon: Dani Degroot MD;  Location: [facility];  Service: Orthopedics;  Laterality: Right;    VASCULAR SURGERY       Family History   Problem Relation Age of Onset    No Known Problems Mother     No Known Problems Father      Social History     Tobacco Use    Smoking status: Former     Packs/day: 1.00     Years: 30.00     Pack years: 30.00     Types: Cigarettes     Passive exposure: Past    Smokeless tobacco: Current     Types:  Chew   Substance Use Topics    Alcohol use: Yes    Drug use: Never     Review of Systems   Respiratory:  Positive for shortness of breath.    Cardiovascular:  Negative for chest pain.   All other systems reviewed and are negative.    Physical Exam     Initial Vitals [07/12/23 1517]   BP Pulse Resp Temp SpO2   (!) 141/80 95 (!) 25 98.9 °F (37.2 °C) (!) 89 %      MAP       --         Physical Exam    Nursing note and vitals reviewed.  Constitutional: He appears well-developed and well-nourished.   HENT:   Head: Normocephalic and atraumatic.   Eyes: Conjunctivae and EOM are normal. Pupils are equal, round, and reactive to light.   Neck: Neck supple.   Normal range of motion.  Cardiovascular:  Normal rate, regular rhythm and normal heart sounds.           Pulmonary/Chest: No respiratory distress. He has wheezes.   Musculoskeletal:         General: No tenderness. Normal range of motion.      Cervical back: Normal range of motion and neck supple.     Neurological: He is alert and oriented to person, place, and time.   Skin: Skin is warm.   Psychiatric: He has a normal mood and affect.       ED Course   Procedures  Labs Reviewed   COMPREHENSIVE METABOLIC PANEL - Abnormal; Notable for the following components:       Result Value    CO2 33 (*)     Albumin 3.4 (*)     All other components within normal limits   NT-PRO NATRIURETIC PEPTIDE - Abnormal; Notable for the following components:    ProBNP 565 (*)     All other components within normal limits   CBC WITH DIFFERENTIAL - Abnormal; Notable for the following components:    RBC 4.11 (*)     Hemoglobin 12.8 (*)     Hematocrit 39.9 (*)     MCV 97.1 (*)     MCH 31.1 (*)     Neutrophils % 52.3 (*)     Lymphocytes % 25.6 (*)     Monocytes % 13.4 (*)     Eosinophils % 8.0 (*)     Monocytes, Absolute 0.89 (*)     Eosinophils, Absolute 0.53 (*)     All other components within normal limits   TROPONIN I - Normal   SARS-COV-2 RNA AMPLIFICATION, QUAL - Normal    Narrative:     Negative  SARS-CoV results should not be used as the sole basis for treatment or patient management decisions; negative results should be considered in the context of a patient's recent exposures, history and the presene of clinical signs and symptoms consistent with COVID-19.  Negative results should be treated as presumptive and confirmed by molecular assay, if necessary for patient management.   CULTURE, BLOOD   CULTURE, BLOOD   CBC W/ AUTO DIFFERENTIAL    Narrative:     The following orders were created for panel order CBC auto differential.  Procedure                               Abnormality         Status                     ---------                               -----------         ------                     CBC with Differential[[id number]]        Abnormal            Final result                 Please view results for these tests on the individual orders.   EXTRA TUBES    Narrative:     The following orders were created for panel order EXTRA TUBES.  Procedure                               Abnormality         Status                     ---------                               -----------         ------                     Light Blue Top Hold[[id number]]                              In process                 Red Top Hold[[id number]]                                     In process                   Please view results for these tests on the individual orders.   LIGHT BLUE TOP HOLD   RED TOP HOLD        ECG Results              EKG 12-lead (Final result)  Result time 07/12/23 21:48:05      Final result by Interface, Lab In [facility] (07/12/23 21:48:05)                   Narrative:    Test Reason : R07.9,    Vent. Rate : 094 BPM     Atrial Rate : 000 BPM     P-R Int : 000 ms          QRS Dur : 078 ms      QT Int : 346 ms       P-R-T Axes : 000 065 060 degrees     QTc Int : 406 ms    Atrial fibrillation  Poor R wave progression  Abnormal ECG    Confirmed by Damon León DO ([id number]) on 7/12/2023 9:47:56 PM    Referred By:  [id number]   SELF           Confirmed By:Damon León DO                                  Imaging Results              X-Ray Chest AP Portable (Final result)  Result time 07/12/23 16:24:47      Final result by Melvin Andrade MD (07/12/23 16:24:47)                   Impression:      Cardiomegaly and evidence of CHF      Electronically signed by: Melvin Andrade  Date:    07/12/2023  Time:    16:24               Narrative:    EXAMINATION:  XR CHEST AP PORTABLE    CLINICAL HISTORY:  sob  wheezing;.    COMPARISON:  05/24/2023    TECHNIQUE:  AP portable erect chest    FINDINGS:  There is mild cardiomegaly.  Pulmonary vasculature is slightly prominent.  Mediastinal contours are stable.    There is mild hazy pulmonary edema in the lower lungs.    There is no gross layering pleural fluid of significance.    Osseous structures are unchanged                                    X-Rays:   Independently Interpreted Readings:   Other Readings:  Xray Chest AP portable:   Cardiomegaly and evidence of CHF  Medications   apixaban tablet 2.5 mg (2.5 mg Oral Given 7/12/23 2128)   aspirin EC tablet 81 mg (has no administration in time range)   atorvastatin tablet 40 mg (40 mg Oral Given 7/12/23 2128)   budesonide nebulizer solution 0.5 mg (0.5 mg Nebulization Given 7/13/23 0730)   HYDROcodone-acetaminophen 7.5-325 mg per tablet 1 tablet (has no administration in time range)   metoprolol succinate (TOPROL-XL) 24 hr split tablet 12.5 mg (has no administration in time range)   oxybutynin 24 hr tablet 5 mg (has no administration in time range)   amLODIPine tablet 5 mg (has no administration in time range)   sodium chloride 0.9% flush 10 mL (has no administration in time range)   acetaminophen tablet 650 mg (has no administration in time range)   polyethylene glycol packet 17 g (has no administration in time range)   pantoprazole EC tablet 40 mg (has no administration in time range)   albuterol-ipratropium 2.5 mg-0.5 mg/3 mL nebulizer  solution 3 mL (3 mLs Nebulization Given 7/13/23 0720)   azithromycin (ZITHROMAX) 500 mg in dextrose 5 % (D5W) 250 mL IVPB (0 mg Intravenous Stopped 7/12/23 2020)   dexAMETHasone injection 8 mg (has no administration in time range)   hydrALAZINE injection 10 mg (has no administration in time range)   melatonin tablet 9 mg (has no administration in time range)   ondansetron injection 8 mg (has no administration in time range)   traZODone tablet 50 mg (has no administration in time range)   levalbuterol nebulizer solution 1.25 mg (1.25 mg Nebulization Given 7/12/23 1542)   methylPREDNISolone sodium succinate injection 125 mg (125 mg Intravenous Given 7/12/23 1542)   furosemide injection 20 mg (20 mg Intravenous Given 7/12/23 1759)     Medical Decision Making:   Initial Assessment:   Patient comes in with shortness of breath history of COPD is on 2 L nasal cannula home  Differential Diagnosis:   No history of heart failure.  Congestive heart failure may be 1 of the new diagnosis for this patient  ED Management:  Will admit for observation          Attending Attestation:           Physician Attestation for Scribe:  Physician Attestation Statement for Scribe #1: I, Rajeev Ramirez DO, reviewed documentation, as scribed by Lucille Vásquez in my presence, and it is both accurate and complete.           ED Course as of 07/13/23 0838 Wed Jul 12, 2023   1628 Xray Chest AP portable:   Cardiomegaly and evidence of CHF [BW]      ED Course User Index  [BW] Lucille Vásquez                 Clinical Impression:   Final diagnoses:  [R07.9] Chest pain  [R06.02] Shortness of breath        ED Disposition Condition    Observation                 Rajeev Ramirez DO  07/13/23 0838

## 2023-07-12 NOTE — ED PROVIDER NOTES
Encounter Date: 7/12/2023    SCRIBE #1 NOTE: I, Lucille Vásquez, am scribing for, and in the presence of,  Rajeev Ramirez DO. I have scribed the entire note.     History   No chief complaint on file.    This is an 84 y/o white male,who presents to the ED with complaints of chest discomfort and shortness of breath which has been going for the past 24-48 hours. He notes he has 6 cardiac stents in the past and has been doing good until 48 hours ago. There is no Hx of nausea or vomiting voiced in the ED. His wife states the pt received Dramamine while at home. There are no other complaints/pain in the ED at this time. Pt has a PMHX of Afib, HTN, COPD, and sarcoma. Pt is a former smoker.     The history is provided by the patient and the spouse. No  was used.   Review of patient's allergies indicates:   Allergen Reactions    Sulfa (sulfonamide antibiotics) Hives     Past Medical History:   Diagnosis Date    Atrial fibrillation     COPD (chronic obstructive pulmonary disease)     Hypertension     Sarcoma      Past Surgical History:   Procedure Laterality Date    ABDOMINAL AORTIC ANEURYSM REPAIR      BACK SURGERY      FLEXOR TENDON REPAIR Right 11/4/2021    Procedure: REPAIR, TENDON, FLEXOR;  Surgeon: Dani Degroot MD;  Location: [facility];  Service: Orthopedics;  Laterality: Right;    SURGICAL REMOVAL OF SARCOMA      TRIGGER FINGER RELEASE Right 11/4/2021    Procedure: RELEASE, TRIGGER FINGER;  Surgeon: Dani Degroot MD;  Location: [facility];  Service: Orthopedics;  Laterality: Right;    VASCULAR SURGERY       Family History   Problem Relation Age of Onset    No Known Problems Mother     No Known Problems Father      Social History     Tobacco Use    Smoking status: Former     Packs/day: 1.00     Years: 30.00     Pack years: 30.00     Types: Cigarettes     Passive exposure: Past    Smokeless tobacco: Current     Types: Chew   Substance Use Topics     "Alcohol use: Yes    Drug use: Never     Review of Systems   Respiratory:  Positive for shortness of breath.    Cardiovascular:  Positive for chest pain.   Gastrointestinal:  Negative for nausea and vomiting.   All other systems reviewed and are negative.    Physical Exam     Initial Vitals   BP Pulse Resp Temp SpO2   -- -- -- -- --      MAP       --         Physical Exam    Nursing note and vitals reviewed.  Constitutional: He appears well-developed and well-nourished.   HENT:   Head: Normocephalic and atraumatic.   Eyes: Conjunctivae and EOM are normal. Pupils are equal, round, and reactive to light.   Neck: Neck supple.   Normal range of motion.  Cardiovascular:  Normal rate and regular rhythm.           Pulmonary/Chest: Breath sounds normal. No respiratory distress.   Abdominal: There is no abdominal tenderness.   Musculoskeletal:         General: No tenderness. Normal range of motion.      Cervical back: Normal range of motion and neck supple.     Neurological: He is alert and oriented to person, place, and time.   Skin: Skin is warm.   Psychiatric: He has a normal mood and affect.       ED Course   Procedures  Labs Reviewed - No data to display       Imaging Results    None          Medications - No data to display             Attending Attestation:           Physician Attestation for Scribe:  Physician Attestation Statement for Scribe #1: I, Rajeev Ramirez, DO, reviewed documentation, as scribed by Lucille Vásquez in my presence, and it is both accurate and complete.                        Clinical Impression:    ***Please document a Clinical Impression and click the "Refresh" button to refresh your note and automatically pull in before signing.***         "

## 2023-07-12 NOTE — ASSESSMENT & PLAN NOTE
Secondary to COPD exacerbation vs CHF exacerbation. Baseline O2 requirement 2L NC.     Some cardiomegaly and interstitial abnormalities on CXR however consistent with prior imaging. pBNP 565 (678 about two months ago).     Lasix, Solumedrol, nebs completed prior to admission.    - dexamethasone 8 mg IV q24h  - azithromycin 500 mg IV q24h x 3 days  - Duonebs q6h  - budesonide nebs q12h  - O2 NC

## 2023-07-12 NOTE — ASSESSMENT & PLAN NOTE
Baseline O2 2L NC, worsening shortness of breath over the past few days. O2 sat 89% on presentation, 95% on NC at time of admission.    IV Solumedrol, nebs given prior to admission.     - dexamethasone 8 mg IV q24h  - azithromycin 500 mg IV q24h x 3 days  - Duonebs q6h  - budesonide nebs q12h  - O2 NC with target O2 90-93%

## 2023-07-13 LAB
ANION GAP SERPL CALCULATED.3IONS-SCNC: 7 MMOL/L (ref 7–16)
BASOPHILS # BLD AUTO: 0 K/UL (ref 0–0.2)
BASOPHILS NFR BLD AUTO: 0 % (ref 0–1)
BUN SERPL-MCNC: 14 MG/DL (ref 7–18)
BUN/CREAT SERPL: 10 (ref 6–20)
CALCIUM SERPL-MCNC: 9.7 MG/DL (ref 8.5–10.1)
CHLORIDE SERPL-SCNC: 98 MMOL/L (ref 98–107)
CO2 SERPL-SCNC: 34 MMOL/L (ref 21–32)
CREAT SERPL-MCNC: 1.36 MG/DL (ref 0.7–1.3)
DIFFERENTIAL METHOD BLD: ABNORMAL
EGFR (NO RACE VARIABLE) (RUSH/TITUS): 51 ML/MIN/1.73M2
EOSINOPHIL # BLD AUTO: 0 K/UL (ref 0–0.5)
EOSINOPHIL NFR BLD AUTO: 0 % (ref 1–4)
ERYTHROCYTE [DISTWIDTH] IN BLOOD BY AUTOMATED COUNT: 13.3 % (ref 11.5–14.5)
GLUCOSE SERPL-MCNC: 168 MG/DL (ref 74–106)
HCT VFR BLD AUTO: 40.6 % (ref 40–54)
HGB BLD-MCNC: 12.8 G/DL (ref 13.5–18)
IMM GRANULOCYTES # BLD AUTO: 0.01 K/UL (ref 0–0.04)
IMM GRANULOCYTES NFR BLD: 0.5 % (ref 0–0.4)
LYMPHOCYTES # BLD AUTO: 0.4 K/UL (ref 1–4.8)
LYMPHOCYTES NFR BLD AUTO: 18.6 % (ref 27–41)
LYMPHOCYTES NFR BLD MANUAL: 19 % (ref 27–41)
MCH RBC QN AUTO: 30.3 PG (ref 27–31)
MCHC RBC AUTO-ENTMCNC: 31.5 G/DL (ref 32–36)
MCV RBC AUTO: 96.2 FL (ref 80–96)
MONOCYTES # BLD AUTO: 0.08 K/UL (ref 0–0.8)
MONOCYTES NFR BLD AUTO: 3.7 % (ref 2–6)
MONOCYTES NFR BLD MANUAL: 4 % (ref 2–6)
MPC BLD CALC-MCNC: 10.3 FL (ref 9.4–12.4)
NEUTROPHILS # BLD AUTO: 1.66 K/UL (ref 1.8–7.7)
NEUTROPHILS NFR BLD AUTO: 77.2 % (ref 53–65)
NEUTS SEG NFR BLD MANUAL: 77 % (ref 50–62)
NRBC # BLD AUTO: 0 X10E3/UL
NRBC, AUTO (.00): 0 %
PLATELET # BLD AUTO: 206 K/UL (ref 150–400)
PLATELET MORPHOLOGY: NORMAL
POTASSIUM SERPL-SCNC: 4.6 MMOL/L (ref 3.5–5.1)
RBC # BLD AUTO: 4.22 M/UL (ref 4.6–6.2)
RBC MORPH BLD: NORMAL
SODIUM SERPL-SCNC: 134 MMOL/L (ref 136–145)
WBC # BLD AUTO: 2.15 K/UL (ref 4.5–11)

## 2023-07-13 PROCEDURE — 27000221 HC OXYGEN, UP TO 24 HOURS

## 2023-07-13 PROCEDURE — 94761 N-INVAS EAR/PLS OXIMETRY MLT: CPT

## 2023-07-13 PROCEDURE — 63600175 PHARM REV CODE 636 W HCPCS: Performed by: FAMILY MEDICINE

## 2023-07-13 PROCEDURE — 99900035 HC TECH TIME PER 15 MIN (STAT)

## 2023-07-13 PROCEDURE — 85025 COMPLETE CBC W/AUTO DIFF WBC: CPT | Performed by: FAMILY MEDICINE

## 2023-07-13 PROCEDURE — 25000242 PHARM REV CODE 250 ALT 637 W/ HCPCS: Performed by: FAMILY MEDICINE

## 2023-07-13 PROCEDURE — 99232 PR SUBSEQUENT HOSPITAL CARE,LEVL II: ICD-10-PCS | Mod: ,,, | Performed by: FAMILY MEDICINE

## 2023-07-13 PROCEDURE — 94640 AIRWAY INHALATION TREATMENT: CPT | Mod: XB

## 2023-07-13 PROCEDURE — 99232 SBSQ HOSP IP/OBS MODERATE 35: CPT | Mod: ,,, | Performed by: FAMILY MEDICINE

## 2023-07-13 PROCEDURE — 80048 BASIC METABOLIC PNL TOTAL CA: CPT | Performed by: FAMILY MEDICINE

## 2023-07-13 PROCEDURE — 96375 TX/PRO/DX INJ NEW DRUG ADDON: CPT

## 2023-07-13 PROCEDURE — G0378 HOSPITAL OBSERVATION PER HR: HCPCS

## 2023-07-13 PROCEDURE — 25000003 PHARM REV CODE 250: Performed by: FAMILY MEDICINE

## 2023-07-13 RX ORDER — BUDESONIDE 0.5 MG/2ML
0.5 INHALANT ORAL EVERY 12 HOURS
Status: DISCONTINUED | OUTPATIENT
Start: 2023-07-13 | End: 2023-07-14 | Stop reason: HOSPADM

## 2023-07-13 RX ADMIN — ATORVASTATIN CALCIUM 40 MG: 40 TABLET, FILM COATED ORAL at 09:07

## 2023-07-13 RX ADMIN — IPRATROPIUM BROMIDE AND ALBUTEROL SULFATE 3 ML: .5; 3 SOLUTION RESPIRATORY (INHALATION) at 07:07

## 2023-07-13 RX ADMIN — DEXAMETHASONE SODIUM PHOSPHATE 8 MG: 4 INJECTION, SOLUTION INTRA-ARTICULAR; INTRALESIONAL; INTRAMUSCULAR; INTRAVENOUS; SOFT TISSUE at 10:07

## 2023-07-13 RX ADMIN — IPRATROPIUM BROMIDE AND ALBUTEROL SULFATE 3 ML: .5; 3 SOLUTION RESPIRATORY (INHALATION) at 12:07

## 2023-07-13 RX ADMIN — APIXABAN 2.5 MG: 2.5 TABLET, FILM COATED ORAL at 09:07

## 2023-07-13 RX ADMIN — METOPROLOL SUCCINATE 12.5 MG: 25 TABLET, EXTENDED RELEASE ORAL at 09:07

## 2023-07-13 RX ADMIN — PANTOPRAZOLE SODIUM 40 MG: 40 TABLET, DELAYED RELEASE ORAL at 09:07

## 2023-07-13 RX ADMIN — BUDESONIDE 0.5 MG: 0.5 SUSPENSION RESPIRATORY (INHALATION) at 07:07

## 2023-07-13 RX ADMIN — POLYETHYLENE GLYCOL 3350 17 G: 17 POWDER, FOR SOLUTION ORAL at 09:07

## 2023-07-13 RX ADMIN — AMLODIPINE BESYLATE 5 MG: 5 TABLET ORAL at 09:07

## 2023-07-13 RX ADMIN — OXYBUTYNIN CHLORIDE 5 MG: 5 TABLET, EXTENDED RELEASE ORAL at 09:07

## 2023-07-13 RX ADMIN — ASPIRIN 81 MG: 81 TABLET, COATED ORAL at 09:07

## 2023-07-13 NOTE — H&P
[facility] - Emergency Department  Hospital Medicine  History & Physical    Patient Name: Nathan Hinton  MRN: [id number]  Patient Class: OP- Observation  Admission Date: 7/12/2023  Attending Physician: Zuly Mcmullen DO   Primary Care Provider: Jayant Castro MD         Patient information was obtained from patient, past medical records and ER records.     Subjective:     Principal Problem:Shortness of breath    Chief Complaint:   Chief Complaint   Patient presents with    Shortness of Breath     Pt c/o SOB. Has hx of COPD and wears 2L O2 at home continuous. States he has been more SOB than usual.        HPI: Nathan Hinton is an 83-year-old male with history of COPD on home O2, a-fib, CHF, HTN, and GERD, who presents with increased shortness of breath. Patient states symptoms began yesterday afternoon. He initially had some improvement with his albuterol inhaler, however woke up during the night with significant shortness of breath that did not respond to treatment. His home nurse visited this morning and noted coarse lung sounds, recommended he go to the ED for further evaluation. He denies any fever, chills, chest pain, dizziness, palpitations, or recent sick contacts.     Initial evaluation with the following: troponin WNP, pBNP 565 (most recently ~650), labs otherwise unremarkable. CXR with cardiomegaly, atelectasis, and some increased interstitial markings consistent with prior imaging. EGK with a-fib, rate controlled. Vital signs unremarkable aside from O2 89% on presentation, with improvement to >93% on 2L (patient's baseline requirement) following steroids, breathing treatment, and IV diuretic.     Patient will be placed in observation for continued evaluation and management.        Past Medical History:   Diagnosis Date    Atrial fibrillation     COPD (chronic obstructive pulmonary disease)     Hypertension     Sarcoma        Past Surgical History:   Procedure Laterality Date    ABDOMINAL  AORTIC ANEURYSM REPAIR      BACK SURGERY      FLEXOR TENDON REPAIR Right 11/4/2021    Procedure: REPAIR, TENDON, FLEXOR;  Surgeon: Dani Degroot MD;  Location: [facility];  Service: Orthopedics;  Laterality: Right;    SURGICAL REMOVAL OF SARCOMA      TRIGGER FINGER RELEASE Right 11/4/2021    Procedure: RELEASE, TRIGGER FINGER;  Surgeon: Dani Degroot MD;  Location: [facility];  Service: Orthopedics;  Laterality: Right;    VASCULAR SURGERY         Review of patient's allergies indicates:   Allergen Reactions    Sulfa (sulfonamide antibiotics) Hives       No current facility-administered medications on file prior to encounter.     Current Outpatient Medications on File Prior to Encounter   Medication Sig    albuterol (PROVENTIL/VENTOLIN HFA) 90 mcg/actuation inhaler Inhale into the lungs.    albuterol sulfate 2.5 mg/0.5 mL Nebu Take 2.5 mg by nebulization every 4 (four) hours as needed (P.r.n. wheezing). Rescue    amlodipine besylate (AMLODIPINE ORAL) Take 5 mg by mouth Daily.    apixaban (ELIQUIS) 2.5 mg Tab Take 1 tablet (2.5 mg total) by mouth 2 (two) times daily.    aspirin (ECOTRIN) 81 MG EC tablet Take 81 mg by mouth once daily.    atorvastatin (LIPITOR) 40 MG tablet Take 40 mg by mouth every evening.    budesonide (PULMICORT) 0.5 mg/2 mL nebulizer solution Take 2 mLs (0.5 mg total) by nebulization every 12 (twelve) hours. Controller    budesonide-glycopyr-formoterol (BREZTRI AEROSPHERE) 160-9-4.8 mcg/actuation HFAA Inhale 2 puffs into the lungs 2 (two) times a day.    ciclopirox (PENLAC) 8 % Soln Apply topically nightly. (Patient not taking: Reported on 5/24/2023)    HYDROcodone-acetaminophen (NORCO) 7.5-325 mg per tablet Take 1 tablet by mouth every 6 (six) hours as needed for Pain.    ipratropium (ATROVENT) 0.02 % nebulizer solution Take 2.5 mLs (500 mcg total) by nebulization every 8 (eight) hours. Rescue    metoprolol succinate (TOPROL-XL) 25 MG 24 hr tablet  Take 0.5 tablets (12.5 mg total) by mouth once daily.    mirabegron (MYRBETRIQ) 25 mg Tb24 ER tablet Take 1 tablet (25 mg total) by mouth once daily.    multivit-min/ferrous fumarate (MULTI VITAMIN ORAL) Take by mouth.    omeprazole (PRILOSEC) 20 MG capsule Take 20 mg by mouth.    umeclidinium-vilanteroL (ANORO ELLIPTA) 62.5-25 mcg/actuation DsDv Inhale into the lungs. Controller     Family History       Problem Relation (Age of Onset)    No Known Problems Mother, Father          Tobacco Use    Smoking status: Former     Packs/day: 1.00     Years: 30.00     Pack years: 30.00     Types: Cigarettes     Passive exposure: Past    Smokeless tobacco: Current     Types: Chew   Substance and Sexual Activity    Alcohol use: Yes    Drug use: Never    Sexual activity: Not Currently     Review of Systems   Constitutional:  Positive for appetite change. Negative for diaphoresis and fever.   HENT:  Negative for congestion and sore throat.    Respiratory:  Positive for chest tightness and shortness of breath.    Cardiovascular:  Negative for chest pain, palpitations and leg swelling.   Gastrointestinal:  Negative for abdominal pain, nausea and vomiting.   Neurological:  Negative for dizziness and syncope.   All other systems reviewed and are negative.  Objective:     Vital Signs (Most Recent):  Temp: 98.9 °F (37.2 °C) (07/12/23 1517)  Pulse: 89 (07/12/23 1648)  Resp: 19 (07/12/23 1648)  BP: 132/71 (07/12/23 1648)  SpO2: 95 % (07/12/23 1649) Vital Signs (24h Range):  Temp:  [98.9 °F (37.2 °C)] 98.9 °F (37.2 °C)  Pulse:  [] 89  Resp:  [14-25] 19  SpO2:  [89 %-97 %] 95 %  BP: (121-148)/(63-80) 132/71     Weight: 104.3 kg (230 lb)  Body mass index is 31.19 kg/m².     Physical Exam  Constitutional:       General: He is not in acute distress.     Appearance: Normal appearance. He is not ill-appearing.   HENT:      Head: Normocephalic and atraumatic.      Nose: Nose normal.   Eyes:      Conjunctiva/sclera: Conjunctivae  normal.      Pupils: Pupils are equal, round, and reactive to light.   Cardiovascular:      Rate and Rhythm: Normal rate. Rhythm irregular.   Pulmonary:      Effort: Pulmonary effort is normal.      Comments: Mild coarse breath sounds, scattered  Musculoskeletal:      Cervical back: No rigidity.   Skin:     Coloration: Skin is not jaundiced or pale.      Findings: No rash.   Neurological:      Mental Status: He is alert.   Psychiatric:         Behavior: Behavior normal.         Thought Content: Thought content normal.            CRANIAL NERVES     CN III, IV, VI   Pupils are equal, round, and reactive to light.     Significant Labs: All pertinent labs within the past 24 hours have been reviewed.    Significant Imaging: I have reviewed all pertinent imaging results/findings within the past 24 hours.    Assessment/Plan:     * Shortness of breath  Secondary to COPD exacerbation vs CHF exacerbation. Baseline O2 requirement 2L NC.     Some cardiomegaly and interstitial abnormalities on CXR however consistent with prior imaging. pBNP 565 (678 about two months ago).     Lasix, Solumedrol, nebs completed prior to admission.    - dexamethasone 8 mg IV q24h  - azithromycin 500 mg IV q24h x 3 days  - Duonebs q6h  - budesonide nebs q12h  - O2 NC       Chronic systolic heart failure  Cardiomegaly and interstitial abnormalities on CXR consistent with previous. pBNP 565 on admission, decreased from prior. Appears euvolemic.    Continue metoprolol due to a-fib. Lasix IV given prior to admission.    - strict I/O  - daily weights  - repeat echocardiogram pending    Echo    Result Date: 4/9/2023  · The left ventricle is normal in size with moderately decreased systolic   function.  · The estimated ejection fraction is 35-40%.  · There are segmental left ventricular wall motion abnormalities.  · Atrial fibrillation observed.  · Mild right ventricular enlargement with mildly reduced right ventricular   systolic function.  ·  Mild-to-moderate mitral regurgitation.  · Mild tricuspid regurgitation.  · The estimated PA systolic pressure is 28 mmHg.  · Intermediate central venous pressure (8 mmHg).  · Trivial pericardial effusion.             GERD (gastroesophageal reflux disease)  Continue home PPI      Hyperlipidemia  Statin      A-fib  Patient with Persistent (7 days or more) atrial fibrillation which is controlled currently with Beta Blocker. Patient is currently in atrial fibrillation.[id number] Score: 2. HASBLED Score: 2. Anticoagulation indicated. Anticoagulation done with Eliquis.    Hypertension  Continue home medications.       COPD (chronic obstructive pulmonary disease)  Baseline O2 2L NC, worsening shortness of breath over the past few days. O2 sat 89% on presentation, 95% on NC at time of admission.    IV Solumedrol, nebs given prior to admission.     - dexamethasone 8 mg IV q24h  - azithromycin 500 mg IV q24h x 3 days  - Duonebs q6h  - budesonide nebs q12h  - O2 NC with target O2 90-93%      VTE Risk Mitigation (From admission, onward)         Ordered     apixaban tablet 2.5 mg  2 times daily         07/12/23 1728     Reason for No Pharmacological VTE Prophylaxis  Once        Question:  Reasons:  Answer:  Already adequately anticoagulated on oral Anticoagulants    07/12/23 1728     IP VTE HIGH RISK PATIENT  Once         07/12/23 1728     Place sequential compression device  Until discontinued         07/12/23 1728                   On 07/12/2023, patient should be placed in hospital observation services under my care.        Zuly Mcmullen DO  Department of Hospital Medicine  [facility] - Emergency Department

## 2023-07-13 NOTE — PLAN OF CARE
Problem: Adult Inpatient Plan of Care  Goal: Plan of Care Review  Outcome: Ongoing, Progressing  Flowsheets (Taken 7/12/2023 2118)  Plan of Care Reviewed With: patient  Goal: Optimal Comfort and Wellbeing  Outcome: Ongoing, Progressing  Intervention: Monitor Pain and Promote Comfort  Flowsheets (Taken 7/12/2023 2118)  Pain Management Interventions:   pain management plan reviewed with patient/caregiver   pillow support provided   position adjusted   relaxation techniques promoted   quiet environment facilitated  Intervention: Provide Person-Centered Care  Flowsheets (Taken 7/12/2023 2118)  Trust Relationship/Rapport:   care explained   choices provided   emotional support provided   empathic listening provided   questions answered   thoughts/feelings acknowledged   reassurance provided   questions encouraged

## 2023-07-13 NOTE — SUBJECTIVE & OBJECTIVE
Interval History:    No acute events overnight, patient more comfortable this morning and states he is eating well. O2 is at baseline however he still has some chest tightness and subjective SOB, will continue current treatment today.    Review of Systems   Constitutional:  Positive for appetite change. Negative for diaphoresis and fever.   HENT:  Negative for congestion and sore throat.    Respiratory:  Positive for chest tightness and shortness of breath.    Cardiovascular:  Negative for chest pain, palpitations and leg swelling.   Gastrointestinal:  Negative for abdominal pain, nausea and vomiting.   Neurological:  Negative for dizziness and syncope.   All other systems reviewed and are negative.  Objective:     Vital Signs (Most Recent):  Temp: 97.8 °F (36.6 °C) (07/13/23 0709)  Pulse: 85 (07/13/23 0730)  Resp: 18 (07/13/23 0730)  BP: (!) 147/94 (07/13/23 0709)  SpO2: 98 % (07/13/23 0730) Vital Signs (24h Range):  Temp:  [97.8 °F (36.6 °C)-98.9 °F (37.2 °C)] 97.8 °F (36.6 °C)  Pulse:  [] 85  Resp:  [13-25] 18  SpO2:  [89 %-99 %] 98 %  BP: (121-155)/(63-94) 147/94     Weight: 97.7 kg (215 lb 6.2 oz)  Body mass index is 29.21 kg/m².     Physical Exam  Constitutional:       General: He is not in acute distress.     Appearance: Normal appearance. He is not ill-appearing.   HENT:      Head: Normocephalic and atraumatic.      Nose: Nose normal.   Eyes:      Conjunctiva/sclera: Conjunctivae normal.      Pupils: Pupils are equal, round, and reactive to light.   Cardiovascular:      Rate and Rhythm: Normal rate. Rhythm irregular.   Pulmonary:      Effort: Pulmonary effort is normal.      Comments: Mild coarse breath sounds, scattered  Musculoskeletal:      Cervical back: No rigidity.   Skin:     Coloration: Skin is not jaundiced or pale.      Findings: No rash.   Neurological:      Mental Status: He is alert.   Psychiatric:         Behavior: Behavior normal.         Thought Content: Thought content normal.             CRANIAL NERVES     CN III, IV, VI   Pupils are equal, round, and reactive to light.     Significant Labs: All pertinent labs within the past 24 hours have been reviewed.    Significant Imaging: I have reviewed all pertinent imaging results/findings within the past 24 hours.

## 2023-07-13 NOTE — PROGRESS NOTES
[facility] - [facility] Hospital Medicine  Progress Note    Patient Name: Nathan Hinton  MRN: [id number]  Patient Class: OP- Observation   Admission Date: 7/12/2023  Length of Stay: 0 days  Attending Physician: Zuly Mcmullen DO  Primary Care Provider: Jayant Castro MD        Subjective:     Principal Problem:Shortness of breath        HPI:  Nathan Hinton is an 83-year-old male with history of COPD on home O2, a-fib, CHF, HTN, and GERD, who presents with increased shortness of breath. Patient states symptoms began yesterday afternoon. He initially had some improvement with his albuterol inhaler, however woke up during the night with significant shortness of breath that did not respond to treatment. His home nurse visited this morning and noted coarse lung sounds, recommended he go to the ED for further evaluation. He denies any fever, chills, chest pain, dizziness, palpitations, or recent sick contacts.     Initial evaluation with the following: troponin WNP, pBNP 565 (most recently ~650), labs otherwise unremarkable. CXR with cardiomegaly, atelectasis, and some increased interstitial markings consistent with prior imaging. EGK with a-fib, rate controlled. Vital signs unremarkable aside from O2 89% on presentation, with improvement to >93% on 2L (patient's baseline requirement) following steroids, breathing treatment, and IV diuretic.     Patient will be placed in observation for continued evaluation and management.        Overview/Hospital Course:  No notes on file    Interval History:    No acute events overnight, patient more comfortable this morning and states he is eating well. O2 is at baseline however he still has some chest tightness and subjective SOB, will continue current treatment today.    Review of Systems   Constitutional:  Positive for appetite change. Negative for diaphoresis and fever.   HENT:  Negative for congestion and sore throat.    Respiratory:  Positive for chest  tightness and shortness of breath.    Cardiovascular:  Negative for chest pain, palpitations and leg swelling.   Gastrointestinal:  Negative for abdominal pain, nausea and vomiting.   Neurological:  Negative for dizziness and syncope.   All other systems reviewed and are negative.  Objective:     Vital Signs (Most Recent):  Temp: 97.8 °F (36.6 °C) (07/13/23 0709)  Pulse: 85 (07/13/23 0730)  Resp: 18 (07/13/23 0730)  BP: (!) 147/94 (07/13/23 0709)  SpO2: 98 % (07/13/23 0730) Vital Signs (24h Range):  Temp:  [97.8 °F (36.6 °C)-98.9 °F (37.2 °C)] 97.8 °F (36.6 °C)  Pulse:  [] 85  Resp:  [13-25] 18  SpO2:  [89 %-99 %] 98 %  BP: (121-155)/(63-94) 147/94     Weight: 97.7 kg (215 lb 6.2 oz)  Body mass index is 29.21 kg/m².     Physical Exam  Constitutional:       General: He is not in acute distress.     Appearance: Normal appearance. He is not ill-appearing.   HENT:      Head: Normocephalic and atraumatic.      Nose: Nose normal.   Eyes:      Conjunctiva/sclera: Conjunctivae normal.      Pupils: Pupils are equal, round, and reactive to light.   Cardiovascular:      Rate and Rhythm: Normal rate. Rhythm irregular.   Pulmonary:      Effort: Pulmonary effort is normal.      Comments: Mild coarse breath sounds, scattered  Musculoskeletal:      Cervical back: No rigidity.   Skin:     Coloration: Skin is not jaundiced or pale.      Findings: No rash.   Neurological:      Mental Status: He is alert.   Psychiatric:         Behavior: Behavior normal.         Thought Content: Thought content normal.            CRANIAL NERVES     CN III, IV, VI   Pupils are equal, round, and reactive to light.     Significant Labs: All pertinent labs within the past 24 hours have been reviewed.    Significant Imaging: I have reviewed all pertinent imaging results/findings within the past 24 hours.      Assessment/Plan:      * Shortness of breath  Secondary to COPD exacerbation vs CHF exacerbation. Baseline O2 requirement 2L NC.     Some  cardiomegaly and interstitial abnormalities on CXR however consistent with prior imaging. pBNP 565 (678 about two months ago).     Lasix, Solumedrol, nebs completed prior to admission.    - dexamethasone 8 mg IV q24h  - azithromycin 500 mg IV q24h x 3 days  - Duonebs q6h  - budesonide nebs q12h  - O2 NC       Chronic systolic heart failure  Cardiomegaly and interstitial abnormalities on CXR consistent with previous. pBNP 565 on admission, decreased from prior. Appears euvolemic.    Continue metoprolol due to a-fib. Lasix IV given prior to admission.    - strict I/O  - daily weights  - repeat echocardiogram pending    Echo    Result Date: 4/9/2023  · The left ventricle is normal in size with moderately decreased systolic   function.  · The estimated ejection fraction is 35-40%.  · There are segmental left ventricular wall motion abnormalities.  · Atrial fibrillation observed.  · Mild right ventricular enlargement with mildly reduced right ventricular   systolic function.  · Mild-to-moderate mitral regurgitation.  · Mild tricuspid regurgitation.  · The estimated PA systolic pressure is 28 mmHg.  · Intermediate central venous pressure (8 mmHg).  · Trivial pericardial effusion.             GERD (gastroesophageal reflux disease)  Continue home PPI      Hyperlipidemia  Statin      A-fib  Patient with Persistent (7 days or more) atrial fibrillation which is controlled currently with Beta Blocker. Patient is currently in atrial fibrillation.[id number] Score: 2. HASBLED Score: 2. Anticoagulation indicated. Anticoagulation done with Eliquis.    Hypertension  Continue home medications.       COPD (chronic obstructive pulmonary disease)  Baseline O2 2L NC, worsening shortness of breath over the past few days. O2 sat 89% on presentation, 95% on NC at time of admission.    IV Solumedrol, nebs given prior to admission.     - dexamethasone 8 mg IV q24h  - azithromycin 500 mg IV q24h x 3 days  - Duonebs q6h  - budesonide nebs  q12h  - O2 NC with target O2 90-93%      VTE Risk Mitigation (From admission, onward)         Ordered     apixaban tablet 2.5 mg  2 times daily         07/12/23 1728     Reason for No Pharmacological VTE Prophylaxis  Once        Question:  Reasons:  Answer:  Already adequately anticoagulated on oral Anticoagulants    07/12/23 1728     IP VTE HIGH RISK PATIENT  Once         07/12/23 1728     Place sequential compression device  Until discontinued         07/12/23 1728                Discharge Planning   ANGELIQUE:      Code Status: Full Code   Is the patient medically ready for discharge?:     Reason for patient still in hospital (select all that apply): Treatment                     Zuly Mcmullen DO  Department of Hospital Medicine   [facility] - [facility]

## 2023-07-13 NOTE — PLAN OF CARE
[facility]  Initial Discharge Assessment       Primary Care Provider: Jayant Castro MD    Admission Diagnosis: Shortness of breath [R06.02]  Chronic systolic heart failure [I50.22]  Primary hypertension [I10]  Chest pain [R07.9]  Chronic obstructive pulmonary disease with acute exacerbation [J44.1]  Longstanding persistent atrial fibrillation [I48.11]    Admission Date: 7/12/2023  Expected Discharge Date:     Transition of Care Barriers: None    Payor: [employer] / Plan: [place] [employer] [employer]UM / Product Type: Government /     Extended Emergency Contact Information  Primary Emergency Contact: BROOKE HUMPHREYS  Mobile Phone: [phone]  Relation: Daughter   needed? No  Secondary Emergency Contact: brian rodriguez  Mobile Phone: [phone]  Relation: Other  Preferred language: English   needed? No    Discharge Plan A: Home, Home with family  Discharge Plan B: Home, Home with family      [facility]/pharmacy #[id number] - [place], [place] - [address] AT [facility]  [address]  [facility] [address]  Phone: [phone] Fax: [phone]    [facility], [place] - [address]  [address]  [facility] [address]  Phone: [phone] Fax: [phone]      Initial Assessment (most recent)       Adult Discharge Assessment - 07/13/23 1206          Discharge Assessment    Assessment Type Discharge Planning Assessment     Source of Information patient     People in Home child(glen), adult     Do you expect to return to your current living situation? Yes     Do you have help at home or someone to help you manage your care at home? Yes     Who are your caregiver(s) and their phone number(s)? Brooke Manley [phone]     Prior to hospitilization cognitive status: Unable to Assess     Current cognitive status: Alert/Oriented     Walking or Climbing Stairs ambulation difficulty, requires equipment     Mobility Management  Rollator     Home Accessibility stairs to enter home     Number of Stairs, Main Entrance none     Equipment Currently Used at Home rollator;shower chair;nebulizer;oxygen     Readmission within 30 days? No     Patient currently being followed by outpatient case management? No     Do you currently have service(s) that help you manage your care at home? Yes     Name and Contact number of agency [facility]- [phone]     Is the pt/caregiver preference to resume services with current agency Yes     Do you take prescription medications? Yes     Do you have prescription coverage? Yes     Coverage [employer].     Do you have any problems affording any of your prescribed medications? No     Is the patient taking medications as prescribed? yes     Who is going to help you get home at discharge? Daughter     How do you get to doctors appointments? family or friend will provide     Are you on dialysis? No     Do you take coumadin? Yes     Discharge Plan A Home;Home with family     Discharge Plan B Home;Home with family     DME Needed Upon Discharge  none     Discharge Plan discussed with: Patient     Transition of Care Barriers None                   Pt lives at home with son and daughter lives next door. Pt uses a nebulizer, oxygen, shower chair and rollator. Pt is current with [facility], choice obtained and plans to return home and resume care with [facility] [place]. SW faxed initial NANCY referral to [facility]. SDOH questions completed 3 months ago. ENE will cont to follow for [place] needs.

## 2023-07-13 NOTE — HPI
Nathan Hinton is an 83-year-old male with history of COPD on home O2, a-fib, CHF, HTN, and GERD, who presents with increased shortness of breath. Patient states symptoms began yesterday afternoon. He initially had some improvement with his albuterol inhaler, however woke up during the night with significant shortness of breath that did not respond to treatment. His home nurse visited this morning and noted coarse lung sounds, recommended he go to the ED for further evaluation. He denies any fever, chills, chest pain, dizziness, palpitations, or recent sick contacts.     Initial evaluation with the following: troponin WNP, pBNP 565 (most recently ~650), labs otherwise unremarkable. CXR with cardiomegaly, atelectasis, and some increased interstitial markings consistent with prior imaging. EGK with a-fib, rate controlled. Vital signs unremarkable aside from O2 89% on presentation, with improvement to >93% on 2L (patient's baseline requirement) following steroids, breathing treatment, and IV diuretic.     Patient will be placed in observation for continued evaluation and management.

## 2023-07-13 NOTE — SUBJECTIVE & OBJECTIVE
Past Medical History:   Diagnosis Date    Atrial fibrillation     COPD (chronic obstructive pulmonary disease)     Hypertension     Sarcoma        Past Surgical History:   Procedure Laterality Date    ABDOMINAL AORTIC ANEURYSM REPAIR      BACK SURGERY      FLEXOR TENDON REPAIR Right 11/4/2021    Procedure: REPAIR, TENDON, FLEXOR;  Surgeon: Dani Degroot MD;  Location: [facility];  Service: Orthopedics;  Laterality: Right;    SURGICAL REMOVAL OF SARCOMA      TRIGGER FINGER RELEASE Right 11/4/2021    Procedure: RELEASE, TRIGGER FINGER;  Surgeon: Dani Degroot MD;  Location: [facility];  Service: Orthopedics;  Laterality: Right;    VASCULAR SURGERY         Review of patient's allergies indicates:   Allergen Reactions    Sulfa (sulfonamide antibiotics) Hives       No current facility-administered medications on file prior to encounter.     Current Outpatient Medications on File Prior to Encounter   Medication Sig    albuterol (PROVENTIL/VENTOLIN HFA) 90 mcg/actuation inhaler Inhale into the lungs.    albuterol sulfate 2.5 mg/0.5 mL Nebu Take 2.5 mg by nebulization every 4 (four) hours as needed (P.r.n. wheezing). Rescue    amlodipine besylate (AMLODIPINE ORAL) Take 5 mg by mouth Daily.    apixaban (ELIQUIS) 2.5 mg Tab Take 1 tablet (2.5 mg total) by mouth 2 (two) times daily.    aspirin (ECOTRIN) 81 MG EC tablet Take 81 mg by mouth once daily.    atorvastatin (LIPITOR) 40 MG tablet Take 40 mg by mouth every evening.    budesonide (PULMICORT) 0.5 mg/2 mL nebulizer solution Take 2 mLs (0.5 mg total) by nebulization every 12 (twelve) hours. Controller    budesonide-glycopyr-formoterol (BREZTRI AEROSPHERE) 160-9-4.8 mcg/actuation HFAA Inhale 2 puffs into the lungs 2 (two) times a day.    ciclopirox (PENLAC) 8 % Soln Apply topically nightly. (Patient not taking: Reported on 5/24/2023)    HYDROcodone-acetaminophen (NORCO) 7.5-325 mg per tablet Take 1 tablet by mouth every 6 (six) hours as needed  for Pain.    ipratropium (ATROVENT) 0.02 % nebulizer solution Take 2.5 mLs (500 mcg total) by nebulization every 8 (eight) hours. Rescue    metoprolol succinate (TOPROL-XL) 25 MG 24 hr tablet Take 0.5 tablets (12.5 mg total) by mouth once daily.    mirabegron (MYRBETRIQ) 25 mg Tb24 ER tablet Take 1 tablet (25 mg total) by mouth once daily.    multivit-min/ferrous fumarate (MULTI VITAMIN ORAL) Take by mouth.    omeprazole (PRILOSEC) 20 MG capsule Take 20 mg by mouth.    umeclidinium-vilanteroL (ANORO ELLIPTA) 62.5-25 mcg/actuation DsDv Inhale into the lungs. Controller     Family History       Problem Relation (Age of Onset)    No Known Problems Mother, Father          Tobacco Use    Smoking status: Former     Packs/day: 1.00     Years: 30.00     Pack years: 30.00     Types: Cigarettes     Passive exposure: Past    Smokeless tobacco: Current     Types: Chew   Substance and Sexual Activity    Alcohol use: Yes    Drug use: Never    Sexual activity: Not Currently     Review of Systems   Constitutional:  Positive for appetite change. Negative for diaphoresis and fever.   HENT:  Negative for congestion and sore throat.    Respiratory:  Positive for chest tightness and shortness of breath.    Cardiovascular:  Negative for chest pain, palpitations and leg swelling.   Gastrointestinal:  Negative for abdominal pain, nausea and vomiting.   Neurological:  Negative for dizziness and syncope.   All other systems reviewed and are negative.  Objective:     Vital Signs (Most Recent):  Temp: 98.9 °F (37.2 °C) (07/12/23 1517)  Pulse: 89 (07/12/23 1648)  Resp: 19 (07/12/23 1648)  BP: 132/71 (07/12/23 1648)  SpO2: 95 % (07/12/23 1649) Vital Signs (24h Range):  Temp:  [98.9 °F (37.2 °C)] 98.9 °F (37.2 °C)  Pulse:  [] 89  Resp:  [14-25] 19  SpO2:  [89 %-97 %] 95 %  BP: (121-148)/(63-80) 132/71     Weight: 104.3 kg (230 lb)  Body mass index is 31.19 kg/m².     Physical Exam  Constitutional:       General: He is not in acute  distress.     Appearance: Normal appearance. He is not ill-appearing.   HENT:      Head: Normocephalic and atraumatic.      Nose: Nose normal.   Eyes:      Conjunctiva/sclera: Conjunctivae normal.      Pupils: Pupils are equal, round, and reactive to light.   Cardiovascular:      Rate and Rhythm: Normal rate. Rhythm irregular.   Pulmonary:      Effort: Pulmonary effort is normal.      Comments: Mild coarse breath sounds, scattered  Musculoskeletal:      Cervical back: No rigidity.   Skin:     Coloration: Skin is not jaundiced or pale.      Findings: No rash.   Neurological:      Mental Status: He is alert.   Psychiatric:         Behavior: Behavior normal.         Thought Content: Thought content normal.            CRANIAL NERVES     CN III, IV, VI   Pupils are equal, round, and reactive to light.     Significant Labs: All pertinent labs within the past 24 hours have been reviewed.    Significant Imaging: I have reviewed all pertinent imaging results/findings within the past 24 hours.

## 2023-07-14 VITALS
TEMPERATURE: 98 F | HEIGHT: 72 IN | SYSTOLIC BLOOD PRESSURE: 114 MMHG | BODY MASS INDEX: 29.17 KG/M2 | HEART RATE: 91 BPM | WEIGHT: 215.38 LBS | DIASTOLIC BLOOD PRESSURE: 59 MMHG | RESPIRATION RATE: 16 BRPM | OXYGEN SATURATION: 95 %

## 2023-07-14 PROBLEM — R06.02 SHORTNESS OF BREATH: Status: RESOLVED | Noted: 2023-07-12 | Resolved: 2023-07-14

## 2023-07-14 LAB — VERIGENE RESULT: NEGATIVE

## 2023-07-14 PROCEDURE — 25000003 PHARM REV CODE 250: Performed by: FAMILY MEDICINE

## 2023-07-14 PROCEDURE — 94640 AIRWAY INHALATION TREATMENT: CPT | Mod: XB

## 2023-07-14 PROCEDURE — 27000221 HC OXYGEN, UP TO 24 HOURS

## 2023-07-14 PROCEDURE — 99900035 HC TECH TIME PER 15 MIN (STAT)

## 2023-07-14 PROCEDURE — 63600175 PHARM REV CODE 636 W HCPCS: Performed by: FAMILY MEDICINE

## 2023-07-14 PROCEDURE — G0378 HOSPITAL OBSERVATION PER HR: HCPCS

## 2023-07-14 PROCEDURE — 96366 THER/PROPH/DIAG IV INF ADDON: CPT

## 2023-07-14 PROCEDURE — 94761 N-INVAS EAR/PLS OXIMETRY MLT: CPT

## 2023-07-14 PROCEDURE — 99239 PR HOSPITAL DISCHARGE DAY,>30 MIN: ICD-10-PCS | Mod: ,,, | Performed by: FAMILY MEDICINE

## 2023-07-14 PROCEDURE — 96376 TX/PRO/DX INJ SAME DRUG ADON: CPT

## 2023-07-14 PROCEDURE — 99239 HOSP IP/OBS DSCHRG MGMT >30: CPT | Mod: ,,, | Performed by: FAMILY MEDICINE

## 2023-07-14 PROCEDURE — 25000242 PHARM REV CODE 250 ALT 637 W/ HCPCS: Performed by: FAMILY MEDICINE

## 2023-07-14 RX ORDER — FUROSEMIDE 20 MG/1
20 TABLET ORAL DAILY PRN
Qty: 30 TABLET | Refills: 0 | Status: SHIPPED | OUTPATIENT
Start: 2023-07-14 | End: 2023-11-16 | Stop reason: CLARIF

## 2023-07-14 RX ORDER — METHYLPREDNISOLONE 4 MG/1
TABLET ORAL
Qty: 21 EACH | Refills: 0 | Status: SHIPPED | OUTPATIENT
Start: 2023-07-14 | End: 2023-08-04

## 2023-07-14 RX ORDER — AZITHROMYCIN 250 MG/1
TABLET, FILM COATED ORAL
Qty: 6 TABLET | Refills: 0 | Status: SHIPPED | OUTPATIENT
Start: 2023-07-14 | End: 2023-07-19

## 2023-07-14 RX ADMIN — OXYBUTYNIN CHLORIDE 5 MG: 5 TABLET, EXTENDED RELEASE ORAL at 09:07

## 2023-07-14 RX ADMIN — DEXAMETHASONE SODIUM PHOSPHATE 8 MG: 4 INJECTION, SOLUTION INTRA-ARTICULAR; INTRALESIONAL; INTRAMUSCULAR; INTRAVENOUS; SOFT TISSUE at 09:07

## 2023-07-14 RX ADMIN — METOPROLOL SUCCINATE 12.5 MG: 25 TABLET, EXTENDED RELEASE ORAL at 09:07

## 2023-07-14 RX ADMIN — IPRATROPIUM BROMIDE AND ALBUTEROL SULFATE 3 ML: .5; 3 SOLUTION RESPIRATORY (INHALATION) at 12:07

## 2023-07-14 RX ADMIN — PANTOPRAZOLE SODIUM 40 MG: 40 TABLET, DELAYED RELEASE ORAL at 09:07

## 2023-07-14 RX ADMIN — APIXABAN 2.5 MG: 2.5 TABLET, FILM COATED ORAL at 09:07

## 2023-07-14 RX ADMIN — IPRATROPIUM BROMIDE AND ALBUTEROL SULFATE 3 ML: .5; 3 SOLUTION RESPIRATORY (INHALATION) at 06:07

## 2023-07-14 RX ADMIN — BUDESONIDE 0.5 MG: 0.5 SUSPENSION RESPIRATORY (INHALATION) at 07:07

## 2023-07-14 RX ADMIN — POLYETHYLENE GLYCOL 3350 17 G: 17 POWDER, FOR SOLUTION ORAL at 09:07

## 2023-07-14 RX ADMIN — AMLODIPINE BESYLATE 5 MG: 5 TABLET ORAL at 09:07

## 2023-07-14 RX ADMIN — AZITHROMYCIN DIHYDRATE 500 MG: 500 INJECTION, POWDER, LYOPHILIZED, FOR SOLUTION INTRAVENOUS at 04:07

## 2023-07-14 NOTE — PLAN OF CARE
[facility]  Discharge Final Note    Primary Care Provider: Jayant Castro MD    Expected Discharge Date: 7/14/2023    Final Discharge Note (most recent)       Final Note - 07/14/23 1034          Final Note    Assessment Type Final Discharge Note     Anticipated Discharge Disposition Home-Health Care [facility]    What phone number can be called within the next 1-3 days to see how you are doing after discharge? [phone]        Post-Acute Status    Post-Acute Authorization Home Health     Home Health Status Set-up Complete/Auth obtained     Patient choice form signed by patient/caregiver List with quality metrics by geographic area provided;List from CMS Compare     Discharge Delays None known at this time                   After-discharge care                Home Medical Care       [facility]   Service: Home Health Services    [address] [place] [address]   Phone: [phone]                     Pt to [place] home today. SW faxed dc orders to [facility]. No other needs.

## 2023-07-14 NOTE — NURSING
Pt discharged. Teaching done with pt. Pt awaiting to speak with his son so he can bring his oxygen.

## 2023-07-17 LAB
BACTERIA BLD CULT: ABNORMAL
GRAM STN SPEC: ABNORMAL

## 2023-07-18 LAB — BACTERIA BLD CULT: NORMAL

## 2023-07-18 NOTE — ASSESSMENT & PLAN NOTE
Start furosemide 40 mg daily as needed for edema or shortness of breath not relieved by respiratory treatments. Follow up with PCP and cardiology.     Echo    Result Date: 4/9/2023  · The left ventricle is normal in size with moderately decreased systolic   function.  · The estimated ejection fraction is 35-40%.  · There are segmental left ventricular wall motion abnormalities.  · Atrial fibrillation observed.  · Mild right ventricular enlargement with mildly reduced right ventricular   systolic function.  · Mild-to-moderate mitral regurgitation.  · Mild tricuspid regurgitation.  · The estimated PA systolic pressure is 28 mmHg.  · Intermediate central venous pressure (8 mmHg).  · Trivial pericardial effusion.

## 2023-07-18 NOTE — HOSPITAL COURSE
Patient admitted with shortness of breath secondary to COPD and CHF. Hospital course uncomplicated. Symptoms improved significantly with IV steroids and diuresis, patient maintained adequate O2 saturation on his baseline NC requirement. Cardiology not consulted as presentation and clinical progression most consistent with COPD exacerbation, most recent echocardiogram within the last three months. At time of discharge, patient was at his baseline respiratory function with no complaints. Labs and vital signs unremarkable.     At this time, Nathan Hinton has reached maximum benefit on inpatient treatment and will be discharged home with family. He is current with home health and will continue to receive regular services. Follow up with PCP and cardiology. Discharge instructions and education provided, questions addressed.

## 2023-07-18 NOTE — ASSESSMENT & PLAN NOTE
Complete azithromycin and oral steroids. Continue inhalers and nebulizer treatments, chronic supplemental O2.

## 2023-07-18 NOTE — DISCHARGE SUMMARY
[facility]  Discharge Summary      Patient Name: Nathan Hinton  MRN: [id number]  BRIDGETT: [id number]  Patient Class: OP- Observation  Admission Date: 7/12/2023  Hospital Length of Stay: 0 days  Discharge Date and Time: 7/14/2023  1:15 PM  Attending Physician: No att. providers found   Discharging Provider: Zuly Mcmullen DO  Primary Care Provider: Jayant Castro MD    Primary Care Team: Networked reference to record PCT     HPI:   Nathan Hinton is an 83-year-old male with history of COPD on home O2, a-fib, CHF, HTN, and GERD, who presents with increased shortness of breath. Patient states symptoms began yesterday afternoon. He initially had some improvement with his albuterol inhaler, however woke up during the night with significant shortness of breath that did not respond to treatment. His home nurse visited this morning and noted coarse lung sounds, recommended he go to the ED for further evaluation. He denies any fever, chills, chest pain, dizziness, palpitations, or recent sick contacts.     Initial evaluation with the following: troponin WNP, pBNP 565 (most recently ~650), labs otherwise unremarkable. CXR with cardiomegaly, atelectasis, and some increased interstitial markings consistent with prior imaging. EGK with a-fib, rate controlled. Vital signs unremarkable aside from O2 89% on presentation, with improvement to >93% on 2L (patient's baseline requirement) following steroids, breathing treatment, and IV diuretic.     Patient will be placed in observation for continued evaluation and management.        * No surgery found *      Hospital Course:   Patient admitted with shortness of breath secondary to COPD and CHF. Hospital course uncomplicated. Symptoms improved significantly with IV steroids and diuresis, patient maintained adequate O2 saturation on his baseline NC requirement. Cardiology not consulted as presentation and clinical progression most  consistent with COPD exacerbation, most recent echocardiogram within the last three months. At time of discharge, patient was at his baseline respiratory function with no complaints. Labs and vital signs unremarkable.     At this time, Nathan Hinton has reached maximum benefit on inpatient treatment and will be discharged home with family. He is current with home health and will continue to receive regular services. Follow up with PCP and cardiology. Discharge instructions and education provided, questions addressed.        Goals of Care Treatment Preferences:  Code Status: Full Code      Consults:     Pulmonary  COPD (chronic obstructive pulmonary disease)  Complete azithromycin and oral steroids. Continue inhalers and nebulizer treatments, chronic supplemental O2.    Cardiac/Vascular  Chronic systolic heart failure  Start furosemide 40 mg daily as needed for edema or shortness of breath not relieved by respiratory treatments. Follow up with PCP and cardiology.     Echo    Result Date: 4/9/2023  · The left ventricle is normal in size with moderately decreased systolic   function.  · The estimated ejection fraction is 35-40%.  · There are segmental left ventricular wall motion abnormalities.  · Atrial fibrillation observed.  · Mild right ventricular enlargement with mildly reduced right ventricular   systolic function.  · Mild-to-moderate mitral regurgitation.  · Mild tricuspid regurgitation.  · The estimated PA systolic pressure is 28 mmHg.  · Intermediate central venous pressure (8 mmHg).  · Trivial pericardial effusion.             Hyperlipidemia  Statin      Hypertension  Continue home medications.         Final Active Diagnoses:    Diagnosis Date Noted POA    Chronic systolic heart failure [I50.22] 07/12/2023 Yes    Hyperlipidemia [E78.5] 06/05/2023 Yes     Chronic    A-fib [I48.91] 04/07/2023 Yes    Hypertension [I10]  Yes     Chronic    COPD (chronic obstructive pulmonary disease) [J44.9]  Yes     Chronic     GERD (gastroesophageal reflux disease) [K21.9] 11/30/2016 Yes      Problems Resolved During this Admission:    Diagnosis Date Noted Date Resolved POA    PRINCIPAL PROBLEM:  Shortness of breath [R06.02] 07/12/2023 07/14/2023 Yes       Discharged Condition: good    Disposition: Home or Self Care    Follow Up:   Contact information for after-discharge care     Home Medical Care     [facility] .    Service: Home Health Services  Contact information:  [address] [place] [address] C  [place] [id number]  [phone]                           Patient Instructions:      Diet Cardiac     Notify your health care provider if you experience any of the following:  temperature >100.4     Notify your health care provider if you experience any of the following:  persistent nausea and vomiting or diarrhea     Notify your health care provider if you experience any of the following:  severe uncontrolled pain     Notify your health care provider if you experience any of the following:  redness, tenderness, or signs of infection (pain, swelling, redness, odor or green/yellow discharge around incision site)     Notify your health care provider if you experience any of the following:  difficulty breathing or increased cough     Notify your health care provider if you experience any of the following:  severe persistent headache     Notify your health care provider if you experience any of the following:  worsening rash     Notify your health care provider if you experience any of the following:  persistent dizziness, light-headedness, or visual disturbances     Notify your health care provider if you experience any of the following:  increased confusion or weakness     Activity as tolerated       Significant Diagnostic Studies: Labs: All labs within the past 24 hours have been reviewed    Pending Diagnostic Studies:     Procedure Component Value Units Date/Time    EXTRA TUBES [[id number]] Collected: 07/12/23 1545     Order Status: Sent Lab Status: In process Updated: 07/12/23 1545    Specimen: Blood, Venous     Narrative:      The following orders were created for panel order EXTRA TUBES.  Procedure                               Abnormality         Status                     ---------                               -----------         ------                     Light Blue Top Hold[[id number]]                              In process                 Red Top Hold[[id number]]                                     In process                   Please view results for these tests on the individual orders.         Medications:  Reconciled Home Medications:      Medication List      START taking these medications    azithromycin 250 MG tablet  Commonly known as: Z-LENA  Take 2 tablets by mouth on day 1; Take 1 tablet by mouth on days 2-5     furosemide 20 MG tablet  Commonly known as: LASIX  Take 1 tablet (20 mg total) by mouth daily as needed (fluid).     methylPREDNISolone 4 mg tablet  Commonly known as: MEDROL DOSEPACK  use as directed        CONTINUE taking these medications    * albuterol sulfate 2.5 mg/0.5 mL Nebu  Take 2.5 mg by nebulization every 4 (four) hours as needed (P.r.n. wheezing). Rescue     * albuterol 90 mcg/actuation inhaler  Commonly known as: PROVENTIL/VENTOLIN HFA  Inhale into the lungs.     AMLODIPINE ORAL  Take 5 mg by mouth Daily.     ANORO ELLIPTA 62.5-25 mcg/actuation Dsdv  Generic drug: umeclidinium-vilanteroL  Inhale into the lungs. Controller     apixaban 2.5 mg Tab  Commonly known as: ELIQUIS  Take 1 tablet (2.5 mg total) by mouth 2 (two) times daily.     aspirin 81 MG EC tablet  Commonly known as: ECOTRIN  Take 81 mg by mouth once daily.     atorvastatin 40 MG tablet  Commonly known as: LIPITOR  Take 40 mg by mouth every evening.     BREZTRI AEROSPHERE 160-9-4.8 mcg/actuation Hfaa  Generic drug: budesonide-glycopyr-formoterol  Inhale 2 puffs into the lungs 2 (two) times a day.     budesonide 0.5 mg/2 mL  nebulizer solution  Commonly known as: PULMICORT  Take 2 mLs (0.5 mg total) by nebulization every 12 (twelve) hours. Controller     HYDROcodone-acetaminophen 7.5-325 mg per tablet  Commonly known as: NORCO  Take 1 tablet by mouth every 6 (six) hours as needed for Pain.     ipratropium 0.02 % nebulizer solution  Commonly known as: ATROVENT  Take 2.5 mLs (500 mcg total) by nebulization every 8 (eight) hours. Rescue     metoprolol succinate 25 MG 24 hr tablet  Commonly known as: TOPROL-XL  Take 0.5 tablets (12.5 mg total) by mouth once daily.     MULTI VITAMIN ORAL  Take by mouth.     MYRBETRIQ 25 mg Tb24 ER tablet  Generic drug: mirabegron  Take 1 tablet (25 mg total) by mouth once daily.     omeprazole 20 MG capsule  Commonly known as: PRILOSEC  Take 20 mg by mouth.         * This list has 2 medication(s) that are the same as other medications prescribed for you. Read the directions carefully, and ask your doctor or other care provider to review them with you.            STOP taking these medications    ciclopirox 8 % Soln  Commonly known as: PENLAC            Indwelling Lines/Drains at time of discharge:   Lines/Drains/Airways     None                 Time spent on the discharge of patient: 40 minutes         Zuly Mcmullen DO  Department of Hospital Medicine  [facility]

## 2023-07-19 ENCOUNTER — OFFICE VISIT (OUTPATIENT)
Dept: FAMILY MEDICINE | Facility: CLINIC | Age: 83
End: 2023-07-19
Payer: OTHER GOVERNMENT

## 2023-07-19 VITALS
RESPIRATION RATE: 16 BRPM | DIASTOLIC BLOOD PRESSURE: 62 MMHG | BODY MASS INDEX: 29.26 KG/M2 | WEIGHT: 216 LBS | TEMPERATURE: 98 F | SYSTOLIC BLOOD PRESSURE: 110 MMHG | HEIGHT: 72 IN | OXYGEN SATURATION: 94 % | HEART RATE: 52 BPM

## 2023-07-19 DIAGNOSIS — J44.9 CHRONIC OBSTRUCTIVE PULMONARY DISEASE, UNSPECIFIED COPD TYPE: Primary | ICD-10-CM

## 2023-07-19 PROCEDURE — 99495 TRANSJ CARE MGMT MOD F2F 14D: CPT | Mod: ,,, | Performed by: FAMILY MEDICINE

## 2023-07-19 PROCEDURE — 99495 TCM SERVICES (MODERATE COMPLEXITY): ICD-10-PCS | Mod: ,,, | Performed by: FAMILY MEDICINE

## 2023-07-19 RX ORDER — BUDESONIDE 0.5 MG/2ML
0.5 INHALANT ORAL EVERY 12 HOURS
Qty: 360 ML | Refills: 3 | Status: SHIPPED | OUTPATIENT
Start: 2023-07-19 | End: 2024-07-18

## 2023-07-19 RX ORDER — IPRATROPIUM BROMIDE 0.5 MG/2.5ML
500 SOLUTION RESPIRATORY (INHALATION) EVERY 8 HOURS
Qty: 675 ML | Refills: 3 | Status: SHIPPED | OUTPATIENT
Start: 2023-07-19 | End: 2024-07-18

## 2023-07-19 NOTE — PROGRESS NOTES
Jayant Castro MD        PATIENT NAME: Nathan Hinton  : 1940  DATE: 23  MRN: [id number]      Billing Provider: Jayant Castro MD  Level of Service: TCM SERVICES (MODERATE COMPLEXITY)  Patient PCP Information       Provider PCP Type    Jayant Castro MD General            Reason for Visit / Chief Complaint: Hyperlipidemia and Hypertension (3 month check.)       Update PCP  Update Chief Complaint         History of Present Illness / Problem Focused Workflow     Nathan Hinton presents to the clinic with Hyperlipidemia and Hypertension (3 month check.)     TCC for pleuroal efusion.      Hyperlipidemia  Associated symptoms include shortness of breath. Pertinent negatives include no chest pain.   Hypertension  Associated symptoms include shortness of breath. Pertinent negatives include no chest pain, headaches, neck pain or palpitations.     Review of Systems     Review of Systems   Constitutional:  Negative for activity change, appetite change, fever and unexpected weight change.   HENT:  Negative for congestion, rhinorrhea, sinus pressure, sinus pain, sore throat and trouble swallowing.    Eyes:  Negative for photophobia, pain, discharge and visual disturbance.   Respiratory:  Positive for shortness of breath. Negative for cough, chest tightness, wheezing and stridor.    Cardiovascular:  Negative for chest pain, palpitations and leg swelling.   Gastrointestinal:  Negative for abdominal pain, blood in stool, constipation, diarrhea and nausea.   Endocrine: Negative for polydipsia, polyphagia and polyuria.   Genitourinary:  Negative for difficulty urinating, flank pain and hematuria.   Musculoskeletal:  Negative for arthralgias and neck pain.   Skin:  Negative for rash.   Allergic/Immunologic: Negative for food allergies.   Neurological:  Negative for dizziness, tremors, seizures, syncope, weakness (global weakness) and headaches.   Psychiatric/Behavioral:  Negative for behavioral problems, confusion,  decreased concentration, dysphoric mood and hallucinations. The patient is not nervous/anxious.       Medical / Social / Family History     Past Medical History:   Diagnosis Date    Atrial fibrillation     COPD (chronic obstructive pulmonary disease)     Hypertension     Sarcoma        Past Surgical History:   Procedure Laterality Date    ABDOMINAL AORTIC ANEURYSM REPAIR      BACK SURGERY      FLEXOR TENDON REPAIR Right 11/4/2021    Procedure: REPAIR, TENDON, FLEXOR;  Surgeon: Dani Degroot MD;  Location: [facility];  Service: Orthopedics;  Laterality: Right;    SURGICAL REMOVAL OF SARCOMA      TRIGGER FINGER RELEASE Right 11/4/2021    Procedure: RELEASE, TRIGGER FINGER;  Surgeon: Dani Degroot MD;  Location: [facility];  Service: Orthopedics;  Laterality: Right;    VASCULAR SURGERY         Social History    reports that he has quit smoking. His smoking use included cigarettes. He has a 30.00 pack-year smoking history. He has been exposed to tobacco smoke. His smokeless tobacco use includes chew. He reports current alcohol use. He reports that he does not use drugs.    Family History  's family history includes No Known Problems in his father and mother.    Medications and Allergies     Medications  No outpatient medications have been marked as taking for the 7/19/23 encounter (Office Visit) with Jayant Castro MD.       Allergies  Review of patient's allergies indicates:   Allergen Reactions    Sulfa (sulfonamide antibiotics) Hives       Physical Examination     Vitals:    07/19/23 1305   BP: 110/62   Pulse: (!) 52   Resp: 16   Temp: 98.4 °F (36.9 °C)     Physical Exam  Constitutional:       General: He is not in acute distress.     Appearance: Normal appearance.   HENT:      Head: Normocephalic.      Right Ear: Tympanic membrane and ear canal normal.      Left Ear: Tympanic membrane and ear canal normal.      Nose: Nose normal.      Mouth/Throat:      Mouth: Mucous membranes are  moist.      Pharynx: No oropharyngeal exudate.   Eyes:      Extraocular Movements: Extraocular movements intact.      Pupils: Pupils are equal, round, and reactive to light.   Cardiovascular:      Rate and Rhythm: Normal rate and regular rhythm.      Heart sounds: No murmur heard.  Pulmonary:      Effort: Pulmonary effort is normal.      Breath sounds: Normal breath sounds. No wheezing.   Abdominal:      General: Abdomen is flat. Bowel sounds are normal.      Palpations: Abdomen is soft.      Hernia: No hernia is present.   Musculoskeletal:         General: Normal range of motion.      Cervical back: Normal range of motion and neck supple.      Right lower leg: No edema.      Left lower leg: No edema.   Lymphadenopathy:      Cervical: No cervical adenopathy.   Skin:     General: Skin is warm and dry.      Coloration: Skin is not jaundiced.      Findings: No lesion.   Neurological:      General: No focal deficit present.      Mental Status: He is alert and oriented to person, place, and time.      Cranial Nerves: No cranial nerve deficit.      Gait: Gait normal.   Psychiatric:         Mood and Affect: Mood normal.         Behavior: Behavior normal.         Judgment: Judgment normal.        Assessment and Plan (including Health Maintenance)      Problem List  Smart Sets  Document Outside HM   :    Plan:           Health Maintenance Due   Topic Date Due    Pneumococcal Vaccines (Age 65+) (1 - PCV) Never done    TETANUS VACCINE  Never done    Shingles Vaccine (1 of 2) Never done    COVID-19 Vaccine (3 - Booster for Dejan series) 01/27/2022       Problem List Items Addressed This Visit          Pulmonary    COPD (chronic obstructive pulmonary disease) - Primary (Chronic)    Relevant Medications    budesonide (PULMICORT) 0.5 mg/2 mL nebulizer solution    ipratropium (ATROVENT) 0.02 % nebulizer solution       Health Maintenance Topics with due status: Not Due       Topic Last Completion Date    Lipid Panel 08/03/2020     Influenza Vaccine 10/15/2021       Future Appointments   Date Time Provider Department Center   8/15/2023  2:00 PM Hoda Rivera MD [facility]   11/15/2023  1:50 PM Randall Alicea MD [facility]        There are no Patient Instructions on file for this visit.  Follow up in about 3 months (around 10/19/2023) for routine followup.     Signature:  Jayant Castro MD      Date of encounter: 7/19/23

## 2023-08-15 ENCOUNTER — OFFICE VISIT (OUTPATIENT)
Dept: FAMILY MEDICINE | Facility: CLINIC | Age: 83
End: 2023-08-15
Payer: MEDICARE

## 2023-08-15 VITALS
OXYGEN SATURATION: 90 % | WEIGHT: 220 LBS | HEART RATE: 88 BPM | RESPIRATION RATE: 16 BRPM | DIASTOLIC BLOOD PRESSURE: 64 MMHG | TEMPERATURE: 98 F | BODY MASS INDEX: 29.8 KG/M2 | HEIGHT: 72 IN | SYSTOLIC BLOOD PRESSURE: 120 MMHG

## 2023-08-15 DIAGNOSIS — I48.11 LONGSTANDING PERSISTENT ATRIAL FIBRILLATION: ICD-10-CM

## 2023-08-15 DIAGNOSIS — I10 PRIMARY HYPERTENSION: Chronic | ICD-10-CM

## 2023-08-15 DIAGNOSIS — J44.9 CHRONIC OBSTRUCTIVE PULMONARY DISEASE, UNSPECIFIED COPD TYPE: Primary | ICD-10-CM

## 2023-08-15 PROCEDURE — 96372 THER/PROPH/DIAG INJ SC/IM: CPT | Mod: ,,, | Performed by: FAMILY MEDICINE

## 2023-08-15 PROCEDURE — 96372 PR INJECTION,THERAP/PROPH/DIAG2ST, IM OR SUBCUT: ICD-10-PCS | Mod: ,,, | Performed by: FAMILY MEDICINE

## 2023-08-15 PROCEDURE — 99214 PR OFFICE/OUTPT VISIT, EST, LEVL IV, 30-39 MIN: ICD-10-PCS | Mod: 25,,, | Performed by: FAMILY MEDICINE

## 2023-08-15 PROCEDURE — 99214 OFFICE O/P EST MOD 30 MIN: CPT | Mod: 25,,, | Performed by: FAMILY MEDICINE

## 2023-08-15 RX ORDER — AZITHROMYCIN 250 MG/1
TABLET, FILM COATED ORAL
Qty: 6 TABLET | Refills: 0 | Status: SHIPPED | OUTPATIENT
Start: 2023-08-15 | End: 2023-11-16 | Stop reason: CLARIF

## 2023-08-15 RX ORDER — METHYLPREDNISOLONE ACETATE 80 MG/ML
80 INJECTION, SUSPENSION INTRA-ARTICULAR; INTRALESIONAL; INTRAMUSCULAR; SOFT TISSUE
Status: COMPLETED | OUTPATIENT
Start: 2023-08-15 | End: 2023-08-15

## 2023-08-15 RX ORDER — PREDNISONE 20 MG/1
20 TABLET ORAL 2 TIMES DAILY
Qty: 10 TABLET | Refills: 0 | Status: SHIPPED | OUTPATIENT
Start: 2023-08-15 | End: 2023-11-16 | Stop reason: CLARIF

## 2023-08-15 RX ADMIN — METHYLPREDNISOLONE ACETATE 80 MG: 80 INJECTION, SUSPENSION INTRA-ARTICULAR; INTRALESIONAL; INTRAMUSCULAR; SOFT TISSUE at 09:08

## 2023-08-15 NOTE — PROGRESS NOTES
Jayant Castro MD        PATIENT NAME: Nathan Hinton  : 1940  DATE: 8/15/23  MRN: [id number]      Billing Provider: Jayant Castro MD  Level of Service: SC OFFICE/OUTPT VISIT, EST, LEVL IV, 30-39 MIN  Patient PCP Information       Provider PCP Type    Jayant Castro MD General            Reason for Visit / Chief Complaint: Shortness of Breath and Dizziness       Update PCP  Update Chief Complaint         History of Present Illness / Problem Focused Workflow     Nathan Hinton presents to the clinic with Shortness of Breath and Dizziness     Three day hx SOB, cough.  No fever.      Shortness of Breath  Pertinent negatives include no abdominal pain, chest pain, fever, headaches, leg swelling, neck pain, rash, rhinorrhea, sore throat or wheezing.   Dizziness: no fever, no headaches, no nausea, no weakness (global weakness), no palpitations and no chest pain.      Review of Systems     Review of Systems   Constitutional:  Negative for activity change, appetite change, fever and unexpected weight change.   HENT:  Negative for congestion, rhinorrhea, sinus pressure, sinus pain, sore throat and trouble swallowing.    Eyes:  Negative for photophobia, pain, discharge and visual disturbance.   Respiratory:  Positive for shortness of breath. Negative for cough, chest tightness, wheezing and stridor.    Cardiovascular:  Negative for chest pain, palpitations and leg swelling.   Gastrointestinal:  Negative for abdominal pain, blood in stool, constipation, diarrhea and nausea.   Endocrine: Negative for polydipsia, polyphagia and polyuria.   Genitourinary:  Negative for difficulty urinating, flank pain and hematuria.   Musculoskeletal:  Negative for arthralgias and neck pain.   Skin:  Negative for rash.   Allergic/Immunologic: Negative for food allergies.   Neurological:  Positive for dizziness. Negative for tremors, seizures, syncope, weakness (global weakness) and headaches.   Psychiatric/Behavioral:  Negative for  behavioral problems, confusion, decreased concentration, dysphoric mood and hallucinations. The patient is not nervous/anxious.         Medical / Social / Family History     Past Medical History:   Diagnosis Date    Atrial fibrillation     COPD (chronic obstructive pulmonary disease)     Hypertension     Sarcoma        Past Surgical History:   Procedure Laterality Date    ABDOMINAL AORTIC ANEURYSM REPAIR      BACK SURGERY      FLEXOR TENDON REPAIR Right 11/4/2021    Procedure: REPAIR, TENDON, FLEXOR;  Surgeon: aDni Degroot MD;  Location: [facility];  Service: Orthopedics;  Laterality: Right;    SURGICAL REMOVAL OF SARCOMA      TRIGGER FINGER RELEASE Right 11/4/2021    Procedure: RELEASE, TRIGGER FINGER;  Surgeon: Dani Degroot MD;  Location: [facility];  Service: Orthopedics;  Laterality: Right;    VASCULAR SURGERY         Social History    reports that he has quit smoking. His smoking use included cigarettes. He has a 30.0 pack-year smoking history. He has been exposed to tobacco smoke. His smokeless tobacco use includes chew. He reports current alcohol use. He reports that he does not use drugs.    Family History  's family history includes No Known Problems in his father and mother.    Medications and Allergies     Medications  No outpatient medications have been marked as taking for the 8/15/23 encounter (Office Visit) with Jayant Castro MD.       Allergies  Review of patient's allergies indicates:   Allergen Reactions    Sulfa (sulfonamide antibiotics) Hives       Physical Examination     Vitals:    08/15/23 0748   BP: 120/64   Pulse: 88   Resp: 16   Temp: 97.5 °F (36.4 °C)     Physical Exam  Constitutional:       General: He is not in acute distress.     Appearance: Normal appearance.   HENT:      Head: Normocephalic.      Right Ear: Tympanic membrane and ear canal normal.      Left Ear: Tympanic membrane and ear canal normal.      Nose: Nose normal.      Mouth/Throat:       Mouth: Mucous membranes are moist.      Pharynx: No oropharyngeal exudate.   Eyes:      Extraocular Movements: Extraocular movements intact.      Pupils: Pupils are equal, round, and reactive to light.   Cardiovascular:      Rate and Rhythm: Normal rate and regular rhythm.      Heart sounds: No murmur heard.  Pulmonary:      Effort: Pulmonary effort is normal.      Breath sounds: Wheezing present.   Abdominal:      General: Abdomen is flat. Bowel sounds are normal.      Palpations: Abdomen is soft.      Hernia: No hernia is present.   Musculoskeletal:         General: Normal range of motion.      Cervical back: Normal range of motion and neck supple.      Right lower leg: No edema.      Left lower leg: No edema.   Lymphadenopathy:      Cervical: No cervical adenopathy.   Skin:     General: Skin is warm and dry.      Coloration: Skin is not jaundiced.      Findings: No lesion.   Neurological:      General: No focal deficit present.      Mental Status: He is alert and oriented to person, place, and time.      Cranial Nerves: No cranial nerve deficit.      Gait: Gait normal.   Psychiatric:         Mood and Affect: Mood normal.         Behavior: Behavior normal.         Judgment: Judgment normal.          Assessment and Plan (including Health Maintenance)      Problem List  Smart Sets  Document Outside HM   :    Plan:           Health Maintenance Due   Topic Date Due    Pneumococcal Vaccines (Age 65+) (1 - PCV) Never done    TETANUS VACCINE  Never done    Shingles Vaccine (1 of 2) Never done    COVID-19 Vaccine (3 - Booster for Dejan series) 01/27/2022       Problem List Items Addressed This Visit          Pulmonary    COPD (chronic obstructive pulmonary disease) - Primary (Chronic)    Relevant Medications    azithromycin (Z-LENA) 250 MG tablet       Cardiac/Vascular    Hypertension (Chronic)    A-fib       Health Maintenance Topics with due status: Not Due       Topic Last Completion Date    Lipid Panel 08/03/2020     Influenza Vaccine 10/15/2021       Future Appointments   Date Time Provider Department Center   11/8/2023  3:20 PM Randall Alicea MD [facility]        There are no Patient Instructions on file for this visit.  Follow up if symptoms worsen or fail to improve.     Signature:  Jayant Castro MD      Date of encounter: 8/15/23

## 2023-09-09 ENCOUNTER — HOSPITAL ENCOUNTER (EMERGENCY)
Facility: HOSPITAL | Age: 83
Discharge: HOME | End: 2023-09-09
Attending: EMERGENCY MEDICINE
Payer: COMMERCIAL

## 2023-09-09 VITALS
OXYGEN SATURATION: 94 % | HEART RATE: 89 BPM | WEIGHT: 220 LBS | BODY MASS INDEX: 29.8 KG/M2 | DIASTOLIC BLOOD PRESSURE: 69 MMHG | TEMPERATURE: 99 F | SYSTOLIC BLOOD PRESSURE: 118 MMHG | RESPIRATION RATE: 20 BRPM | HEIGHT: 72 IN

## 2023-09-09 DIAGNOSIS — M54.6 ACUTE LEFT-SIDED THORACIC BACK PAIN: Primary | ICD-10-CM

## 2023-09-09 DIAGNOSIS — R06.02 SHORTNESS OF BREATH: ICD-10-CM

## 2023-09-09 LAB
ALBUMIN SERPL BCP-MCNC: 3.1 G/DL (ref 3.5–5)
ALBUMIN/GLOB SERPL: 0.9 {RATIO}
ALP SERPL-CCNC: 93 U/L (ref 45–115)
ALT SERPL W P-5'-P-CCNC: 19 U/L (ref 16–61)
ANION GAP SERPL CALCULATED.3IONS-SCNC: 11 MMOL/L (ref 7–16)
AST SERPL W P-5'-P-CCNC: 22 U/L (ref 15–37)
BASOPHILS # BLD AUTO: 0.02 K/UL (ref 0–0.2)
BASOPHILS NFR BLD AUTO: 0.3 % (ref 0–1)
BILIRUB SERPL-MCNC: 0.6 MG/DL (ref ?–1.2)
BUN SERPL-MCNC: 27 MG/DL (ref 7–18)
BUN/CREAT SERPL: 19 (ref 6–20)
CALCIUM SERPL-MCNC: 8.8 MG/DL (ref 8.5–10.1)
CHLORIDE SERPL-SCNC: 101 MMOL/L (ref 98–107)
CO2 SERPL-SCNC: 31 MMOL/L (ref 21–32)
CREAT SERPL-MCNC: 1.43 MG/DL (ref 0.7–1.3)
DIFFERENTIAL METHOD BLD: ABNORMAL
EGFR (NO RACE VARIABLE) (RUSH/TITUS): 48 ML/MIN/1.73M2
EOSINOPHIL # BLD AUTO: 0.43 K/UL (ref 0–0.5)
EOSINOPHIL NFR BLD AUTO: 7.3 % (ref 1–4)
ERYTHROCYTE [DISTWIDTH] IN BLOOD BY AUTOMATED COUNT: 13.5 % (ref 11.5–14.5)
GLOBULIN SER-MCNC: 3.4 G/DL (ref 2–4)
GLUCOSE SERPL-MCNC: 115 MG/DL (ref 74–106)
HCT VFR BLD AUTO: 37.2 % (ref 40–54)
HGB BLD-MCNC: 12.4 G/DL (ref 13.5–18)
IMM GRANULOCYTES # BLD AUTO: 0.02 K/UL (ref 0–0.04)
IMM GRANULOCYTES NFR BLD: 0.3 % (ref 0–0.4)
LYMPHOCYTES # BLD AUTO: 1.46 K/UL (ref 1–4.8)
LYMPHOCYTES NFR BLD AUTO: 24.9 % (ref 27–41)
MCH RBC QN AUTO: 30.8 PG (ref 27–31)
MCHC RBC AUTO-ENTMCNC: 33.3 G/DL (ref 32–36)
MCV RBC AUTO: 92.3 FL (ref 80–96)
MONOCYTES # BLD AUTO: 0.67 K/UL (ref 0–0.8)
MONOCYTES NFR BLD AUTO: 11.4 % (ref 2–6)
MPC BLD CALC-MCNC: 10 FL (ref 9.4–12.4)
NEUTROPHILS # BLD AUTO: 3.26 K/UL (ref 1.8–7.7)
NEUTROPHILS NFR BLD AUTO: 55.8 % (ref 53–65)
NRBC # BLD AUTO: 0 X10E3/UL
NRBC, AUTO (.00): 0 %
NT-PROBNP SERPL-MCNC: 411 PG/ML (ref 1–450)
PLATELET # BLD AUTO: 179 K/UL (ref 150–400)
POTASSIUM SERPL-SCNC: 4.6 MMOL/L (ref 3.5–5.1)
PROT SERPL-MCNC: 6.5 G/DL (ref 6.4–8.2)
RBC # BLD AUTO: 4.03 M/UL (ref 4.6–6.2)
SODIUM SERPL-SCNC: 138 MMOL/L (ref 136–145)
TROPONIN I SERPL DL<=0.01 NG/ML-MCNC: 39.3 PG/ML
WBC # BLD AUTO: 5.86 K/UL (ref 4.5–11)

## 2023-09-09 PROCEDURE — 99285 EMERGENCY DEPT VISIT HI MDM: CPT | Mod: 25

## 2023-09-09 PROCEDURE — 99284 PR EMERGENCY DEPT VISIT,LEVEL IV: ICD-10-PCS | Mod: ,,, | Performed by: EMERGENCY MEDICINE

## 2023-09-09 PROCEDURE — 83880 ASSAY OF NATRIURETIC PEPTIDE: CPT | Performed by: EMERGENCY MEDICINE

## 2023-09-09 PROCEDURE — 93010 EKG 12-LEAD: ICD-10-PCS | Mod: ,,, | Performed by: STUDENT IN AN ORGANIZED HEALTH CARE EDUCATION/TRAINING PROGRAM

## 2023-09-09 PROCEDURE — 93005 ELECTROCARDIOGRAM TRACING: CPT

## 2023-09-09 PROCEDURE — 93010 ELECTROCARDIOGRAM REPORT: CPT | Mod: ,,, | Performed by: STUDENT IN AN ORGANIZED HEALTH CARE EDUCATION/TRAINING PROGRAM

## 2023-09-09 PROCEDURE — 99284 EMERGENCY DEPT VISIT MOD MDM: CPT | Mod: ,,, | Performed by: EMERGENCY MEDICINE

## 2023-09-09 PROCEDURE — 80053 COMPREHEN METABOLIC PANEL: CPT | Performed by: EMERGENCY MEDICINE

## 2023-09-09 PROCEDURE — 63600175 PHARM REV CODE 636 W HCPCS: Performed by: EMERGENCY MEDICINE

## 2023-09-09 PROCEDURE — 85025 COMPLETE CBC W/AUTO DIFF WBC: CPT | Performed by: EMERGENCY MEDICINE

## 2023-09-09 PROCEDURE — 84484 ASSAY OF TROPONIN QUANT: CPT | Performed by: EMERGENCY MEDICINE

## 2023-09-09 PROCEDURE — 96372 THER/PROPH/DIAG INJ SC/IM: CPT | Performed by: EMERGENCY MEDICINE

## 2023-09-09 RX ORDER — METHOCARBAMOL 500 MG/1
TABLET, FILM COATED ORAL 3 TIMES DAILY
Qty: 30 TABLET | Refills: 0 | Status: SHIPPED | OUTPATIENT
Start: 2023-09-09 | End: 2023-09-14

## 2023-09-09 RX ORDER — ORPHENADRINE CITRATE 30 MG/ML
60 INJECTION INTRAMUSCULAR; INTRAVENOUS
Status: COMPLETED | OUTPATIENT
Start: 2023-09-09 | End: 2023-09-09

## 2023-09-09 RX ORDER — KETOROLAC TROMETHAMINE 30 MG/ML
30 INJECTION, SOLUTION INTRAMUSCULAR; INTRAVENOUS
Status: COMPLETED | OUTPATIENT
Start: 2023-09-09 | End: 2023-09-09

## 2023-09-09 RX ADMIN — KETOROLAC TROMETHAMINE 30 MG: 60 INJECTION, SOLUTION INTRAMUSCULAR at 01:09

## 2023-09-09 RX ADMIN — ORPHENADRINE CITRATE 60 MG: 60 INJECTION INTRAMUSCULAR; INTRAVENOUS at 01:09

## 2023-09-09 NOTE — DISCHARGE INSTRUCTIONS
Avoid NSAIDs such as ibuprofen    Use prescription Robaxin cautiously as it could cause sedation particularly of taking with other sedating medications such as hydrocodone    Follow-up with Dr. Castro.  Return the ER if symptoms are worsening or new symptoms develop

## 2023-09-09 NOTE — ED PROVIDER NOTES
Encounter Date: 9/9/2023       History     Chief Complaint   Patient presents with    Back Pain     Patient complains of left periscapular pain that has worsened over the past couple of days.  Patient says the pain has been present mildly over the past couple of months but has gotten worse.  It hurts when he moves his arm or takes a deep breath.  Also he is had more coughing than usual and has coughed up some yellow sputum.  However no fever.  He does have COPD and wears his usual amount of oxygen.  Patient hardly ever has to take hydrocodone but he did take one dose last night which did help with the pain but did not provide relief for very long.  No associated chest pain.  Patient does take anticoagulation for atrial fibrillation.  Has some associated constipation that he says is only present after taking Norco.  No abdominal pain      Review of patient's allergies indicates:   Allergen Reactions    Sulfa (sulfonamide antibiotics) Hives     Past Medical History:   Diagnosis Date    Atrial fibrillation     COPD (chronic obstructive pulmonary disease)     Hypertension     Sarcoma      Past Surgical History:   Procedure Laterality Date    ABDOMINAL AORTIC ANEURYSM REPAIR      BACK SURGERY      FLEXOR TENDON REPAIR Right 11/4/2021    Procedure: REPAIR, TENDON, FLEXOR;  Surgeon: Dani Degroot MD;  Location: [facility];  Service: Orthopedics;  Laterality: Right;    SURGICAL REMOVAL OF SARCOMA      TRIGGER FINGER RELEASE Right 11/4/2021    Procedure: RELEASE, TRIGGER FINGER;  Surgeon: Dani Degroot MD;  Location: [facility];  Service: Orthopedics;  Laterality: Right;    VASCULAR SURGERY       Family History   Problem Relation Age of Onset    No Known Problems Mother     No Known Problems Father      Social History     Tobacco Use    Smoking status: Former     Current packs/day: 1.00     Average packs/day: 1 pack/day for 30.0 years (30.0 ttl pk-yrs)     Types: Cigarettes     Passive exposure:  Past    Smokeless tobacco: Current     Types: Chew   Substance Use Topics    Alcohol use: Yes    Drug use: Never     Review of Systems   Constitutional:  Negative for fever.   HENT:  Negative for sore throat.    Respiratory:  Negative for shortness of breath.    Cardiovascular:  Negative for chest pain.   Gastrointestinal:  Negative for nausea.   Genitourinary:  Negative for dysuria.   Musculoskeletal:  Positive for back pain.   Skin:  Negative for rash.   Neurological:  Negative for weakness.   Hematological:  Does not bruise/bleed easily.       Physical Exam     Initial Vitals [09/09/23 1304]   BP Pulse Resp Temp SpO2   (!) 105/58 88 19 98.7 °F (37.1 °C) (!) 88 %      MAP       --         Physical Exam    Nursing note and vitals reviewed.  Constitutional: He appears well-developed and well-nourished.   HENT:   Head: Normocephalic and atraumatic.   Eyes: EOM are normal. Pupils are equal, round, and reactive to light.   Neck: Neck supple. No thyromegaly present. No JVD present.   Normal range of motion.  Cardiovascular:  Normal rate, regular rhythm, normal heart sounds and intact distal pulses.           No murmur heard.  Pulmonary/Chest: Breath sounds normal. No stridor. No respiratory distress. He has no wheezes.   Abdominal: Abdomen is soft. Bowel sounds are normal. He exhibits no distension. There is no abdominal tenderness.   Musculoskeletal:         General: No tenderness or edema. Normal range of motion.      Cervical back: Normal range of motion and neck supple.      Comments: Mild tenderness left periscapular area     Lymphadenopathy:     He has no cervical adenopathy.   Neurological: He is alert and oriented to person, place, and time. He has normal strength. No cranial nerve deficit or sensory deficit. GCS score is 15. GCS eye subscore is 4. GCS verbal subscore is 5. GCS motor subscore is 6.   Skin: Skin is warm and dry. Capillary refill takes less than 2 seconds. No rash noted.   Psychiatric: He has a  normal mood and affect.         Medical Screening Exam   See Full Note    ED Course   Procedures  Labs Reviewed   COMPREHENSIVE METABOLIC PANEL - Abnormal; Notable for the following components:       Result Value    Glucose 115 (*)     BUN 27 (*)     Creatinine 1.43 (*)     Albumin 3.1 (*)     eGFR 48 (*)     All other components within normal limits   CBC WITH DIFFERENTIAL - Abnormal; Notable for the following components:    RBC 4.03 (*)     Hemoglobin 12.4 (*)     Hematocrit 37.2 (*)     Lymphocytes % 24.9 (*)     Monocytes % 11.4 (*)     Eosinophils % 7.3 (*)     All other components within normal limits   TROPONIN I - Normal   NT-PRO NATRIURETIC PEPTIDE - Normal   CBC W/ AUTO DIFFERENTIAL    Narrative:     The following orders were created for panel order CBC auto differential.  Procedure                               Abnormality         Status                     ---------                               -----------         ------                     CBC with Differential[[id number]]        Abnormal            Final result                 Please view results for these tests on the individual orders.          Imaging Results              X-Ray Chest AP Portable (Final result)  Result time 09/09/23 13:33:05      Final result by eMlvin Andrade MD (09/09/23 13:33:05)                   Impression:      Cardiomegaly and evidence of CHF      Electronically signed by: Melvin Andrade  Date:    09/09/2023  Time:    13:33               Narrative:    EXAMINATION:  XR CHEST AP PORTABLE    CLINICAL HISTORY:  shortness of breath;.    COMPARISON:  July 12, 2023    TECHNIQUE:  AP erect chest    FINDINGS:  There is cardiomegaly.  Pulmonary vascular is slightly prominent.  Mediastinal contours are unchanged.    There is some mild interstitial edema lung bases.  There is no significant layering pleural effusion.    Osseous structures are similar                                       Medications   orphenadrine injection 60 mg  (60 mg Intramuscular Given 9/9/23 1338)   ketorolac injection 30 mg (30 mg Intramuscular Given 9/9/23 1339)     Medical Decision Making  [facility]    Patient presents for emergent evaluation of acute back pain left periscapular thoracic that poses a threat to life and/or bodily function.    In the ED patient found to have acute musculoskeletal thoracic back strain.    I ordered labs and personally reviewed them.  Labs significant for troponin normal unlikely to be myocardial infarction.  BNP unremarkable.  Creatinine 1.43 chronic mild renal insufficiency.  I ordered X-rays and personally reviewed them and reviewed the radiologist interpretation.  Xray significant for cardiomegaly pulmonary vasculature slightly prominent but no orthopnea no shortness of breath above baseline no edema do not suspect acute CHF exacerbation.    I ordered EKG and personally reviewed it.  EKG significant for atrial fibrillation rate 87.  Controlled rate.  Patient is anticoagulated longstanding atrial fibrillation.      Discharge [facility]  Patient was managed in the ED with IM Toradol IM Norflex  The response to treatment was improved.    Patient was discharged in stable condition.  Detailed return precautions discussed.     Amount and/or Complexity of Data Reviewed  Labs: ordered.  Radiology: ordered.    Risk  Prescription drug management.                               Clinical Impression:   Final diagnoses:  [R06.02] Shortness of breath  [M54.6] Acute left-sided thoracic back pain (Primary)        ED Disposition Condition    Discharge Stable          ED Prescriptions       Medication Sig Dispense Start Date End Date Auth. Provider    methocarbamoL (ROBAXIN) 500 MG Tab Take 2-3 tablets (1,000-1,500 mg total) by mouth 3 (three) times daily. for 5 days 30 tablet 9/9/2023 9/14/2023 Haroon King MD          Follow-up Information       Follow up With Specialties Details Why Contact Info    Jayant Castro MD Family Medicine, Emergency Medicine  Schedule an appointment as soon as possible for a visit  As needed, If symptoms worsen [id number] [facility]  [facility] [address]  [phone]               Haroon King MD  09/09/23 3917

## 2023-09-11 RX ORDER — APIXABAN 2.5 MG/1
2.5 TABLET, FILM COATED ORAL 2 TIMES DAILY
Qty: 60 TABLET | Refills: 2 | Status: SHIPPED | OUTPATIENT
Start: 2023-09-11

## 2023-11-08 ENCOUNTER — OFFICE VISIT (OUTPATIENT)
Dept: PULMONOLOGY | Facility: CLINIC | Age: 83
End: 2023-11-08
Payer: OTHER GOVERNMENT

## 2023-11-08 VITALS
BODY MASS INDEX: 29.8 KG/M2 | RESPIRATION RATE: 14 BRPM | OXYGEN SATURATION: 94 % | WEIGHT: 220 LBS | SYSTOLIC BLOOD PRESSURE: 120 MMHG | DIASTOLIC BLOOD PRESSURE: 76 MMHG | HEART RATE: 101 BPM | HEIGHT: 72 IN

## 2023-11-08 DIAGNOSIS — J44.1 CHRONIC OBSTRUCTIVE PULMONARY DISEASE WITH ACUTE EXACERBATION: Chronic | ICD-10-CM

## 2023-11-08 PROCEDURE — 99213 PR OFFICE/OUTPT VISIT, EST, LEVL III, 20-29 MIN: ICD-10-PCS | Mod: S$PBB,,, | Performed by: INTERNAL MEDICINE

## 2023-11-08 PROCEDURE — 99215 OFFICE O/P EST HI 40 MIN: CPT | Mod: PBBFAC | Performed by: INTERNAL MEDICINE

## 2023-11-08 PROCEDURE — 99213 OFFICE O/P EST LOW 20 MIN: CPT | Mod: S$PBB,,, | Performed by: INTERNAL MEDICINE

## 2023-11-08 RX ORDER — MONTELUKAST SODIUM 10 MG/1
10 TABLET ORAL NIGHTLY
COMMUNITY

## 2023-11-08 RX ORDER — AMOXICILLIN 500 MG
1 CAPSULE ORAL DAILY
COMMUNITY

## 2023-11-08 NOTE — PROGRESS NOTES
Subjective:       Patient ID: Nathan Hinton is a 83 y.o. male.    Chief Complaint: Follow-up    Follow-up  This is a chronic problem. The current episode started more than 1 month ago. The problem has been unchanged. Pertinent negatives include no abdominal pain, arthralgias, chest pain, chills, congestion, headaches or rash.     Past Medical History:   Diagnosis Date    Atrial fibrillation     COPD (chronic obstructive pulmonary disease)     Hypertension     Sarcoma      Past Surgical History:   Procedure Laterality Date    ABDOMINAL AORTIC ANEURYSM REPAIR      BACK SURGERY      FLEXOR TENDON REPAIR Right 11/4/2021    Procedure: REPAIR, TENDON, FLEXOR;  Surgeon: Dani Degroot MD;  Location: [facility];  Service: Orthopedics;  Laterality: Right;    SURGICAL REMOVAL OF SARCOMA      TRIGGER FINGER RELEASE Right 11/4/2021    Procedure: RELEASE, TRIGGER FINGER;  Surgeon: Dani Degroot MD;  Location: [facility];  Service: Orthopedics;  Laterality: Right;    VASCULAR SURGERY       Family History   Problem Relation Age of Onset    No Known Problems Mother     No Known Problems Father      Review of patient's allergies indicates:   Allergen Reactions    Sulfa (sulfonamide antibiotics) Hives      Social History     Tobacco Use    Smoking status: Former     Current packs/day: 1.00     Average packs/day: 1 pack/day for 30.0 years (30.0 ttl pk-yrs)     Types: Cigarettes     Passive exposure: Past    Smokeless tobacco: Current     Types: Chew   Substance Use Topics    Alcohol use: Yes    Drug use: Never      Review of Systems   Constitutional:  Negative for chills, activity change and night sweats.   HENT:  Negative for congestion and ear pain.    Eyes:  Negative for redness and itching.   Cardiovascular:  Negative for chest pain and palpitations.   Musculoskeletal:  Negative for arthralgias and back pain.   Skin:  Negative for rash.   Gastrointestinal:  Negative for abdominal pain and abdominal  distention.   Neurological:  Negative for dizziness and headaches.   Hematological:  Negative for adenopathy. Does not bruise/bleed easily.   Psychiatric/Behavioral:  Negative for confusion. The patient is not nervous/anxious.        Objective:      Physical Exam   Constitutional: He is oriented to person, place, and time. He appears well-developed and well-nourished.   HENT:   Head: Normocephalic.   Nose: Nose normal.   Mouth/Throat: Oropharynx is clear and moist.   Neck: No JVD present. No thyromegaly present.   Cardiovascular: Normal rate, regular rhythm, normal heart sounds and intact distal pulses.   Pulmonary/Chest: Normal expansion, hyperinflation, symmetric chest wall expansion, effort normal and breath sounds normal.   Abdominal: Soft. Bowel sounds are normal.   Musculoskeletal:         General: Normal range of motion.      Cervical back: Normal range of motion and neck supple.   Lymphadenopathy: No supraclavicular adenopathy is present.     He has no cervical adenopathy.   Neurological: He is alert and oriented to person, place, and time. He has normal reflexes.   Skin: Skin is warm and dry.   Psychiatric: He has a normal mood and affect. His behavior is normal.     Personal Diagnostic Review  none pertinent        11/8/2023     3:30 PM 9/9/2023     3:06 PM 9/9/2023     2:31 PM 9/9/2023     2:02 PM 9/9/2023     1:31 PM 9/9/2023     1:13 PM 9/9/2023     1:04 PM   Pulmonary Function Tests   SpO2 94 % 94 % 95 % 94 % 93 % 94 % 88 %   Height 6' (1.829 m)      6' (1.829 m)   Weight 99.8 kg (220 lb)      99.8 kg (220 lb)   BMI (Calculated) 29.8      29.8         Assessment:       1. Chronic obstructive pulmonary disease with acute exacerbation        Outpatient Encounter Medications as of 11/8/2023   Medication Sig Dispense Refill    albuterol (PROVENTIL/VENTOLIN HFA) 90 mcg/actuation inhaler Inhale into the lungs.      albuterol sulfate 2.5 mg/0.5 mL Nebu Take 2.5 mg by nebulization every 4 (four) hours as  needed (P.r.n. wheezing). Rescue 60 each 11    amlodipine besylate (AMLODIPINE ORAL) Take 5 mg by mouth Daily.      atorvastatin (LIPITOR) 40 MG tablet Take 40 mg by mouth every evening.      budesonide (PULMICORT) 0.5 mg/2 mL nebulizer solution Take 2 mLs (0.5 mg total) by nebulization every 12 (twelve) hours. Controller 360 mL 3    budesonide-glycopyr-formoterol (BREZTRI AEROSPHERE) 160-9-4.8 mcg/actuation HFAA Inhale 2 puffs into the lungs 2 (two) times a day. 10.7 g 11    ELIQUIS 2.5 mg Tab TAKE 1 TABLET BY MOUTH TWICE DAILY AS DIRECTED 60 tablet 2    ipratropium (ATROVENT) 0.02 % nebulizer solution Take 2.5 mLs (500 mcg total) by nebulization every 8 (eight) hours. Rescue 675 mL 3    metoprolol succinate (TOPROL-XL) 25 MG 24 hr tablet Take 0.5 tablets (12.5 mg total) by mouth once daily. 15 tablet 11    montelukast (SINGULAIR) 10 mg tablet Take 10 mg by mouth every evening.      multivit-min/ferrous fumarate (MULTI VITAMIN ORAL) Take by mouth.      omega-3 fatty acids/fish oil (FISH OIL-OMEGA-3 FATTY ACIDS) 300-1,000 mg capsule Take 1 capsule by mouth once daily.      omeprazole (PRILOSEC) 20 MG capsule Take 20 mg by mouth.      aspirin (ECOTRIN) 81 MG EC tablet Take 81 mg by mouth once daily.      azithromycin (Z-LENA) 250 MG tablet Take two tablets now then 1 tab daily x 4 days (Patient not taking: Reported on 11/8/2023) 6 tablet 0    furosemide (LASIX) 20 MG tablet Take 1 tablet (20 mg total) by mouth daily as needed (fluid). (Patient not taking: Reported on 11/8/2023) 30 tablet 0    HYDROcodone-acetaminophen (NORCO) 7.5-325 mg per tablet Take 1 tablet by mouth every 6 (six) hours as needed for Pain. (Patient not taking: Reported on 11/8/2023) 60 tablet 0    mirabegron (MYRBETRIQ) 25 mg Tb24 ER tablet Take 1 tablet (25 mg total) by mouth once daily. 30 tablet 11    predniSONE (DELTASONE) 20 MG tablet Take 1 tablet (20 mg total) by mouth 2 (two) times daily. (Patient not taking: Reported on 11/8/2023) 10  tablet 0    umeclidinium-vilanteroL (ANORO ELLIPTA) 62.5-25 mcg/actuation DsDv Inhale into the lungs. Controller       No facility-administered encounter medications on file as of 11/8/2023.     No orders of the defined types were placed in this encounter.      Plan:       Problem List Items Addressed This Visit          Pulmonary    COPD (chronic obstructive pulmonary disease) (Chronic)     Patient was last in the hospital 2 months ago patient has been doing reasonably well no new issues I will see the patient back in the clinic in 6 months currently using Pulmicort Ventolin Anoro no complaints or problems wears oxygen 24 hours a day is able to carry out his activities of daily living no new recommendations

## 2023-11-08 NOTE — ASSESSMENT & PLAN NOTE
Patient was last in the hospital 2 months ago patient has been doing reasonably well no new issues I will see the patient back in the clinic in 6 months currently using Pulmicort Ventolin Anoro no complaints or problems wears oxygen 24 hours a day is able to carry out his activities of daily living no new recommendations

## 2023-11-09 DIAGNOSIS — Z71.89 COMPLEX CARE COORDINATION: ICD-10-CM

## 2023-11-16 ENCOUNTER — HOSPITAL ENCOUNTER (EMERGENCY)
Facility: HOSPITAL | Age: 83
Discharge: HOME | End: 2023-11-16
Attending: EMERGENCY MEDICINE
Payer: MEDICARE

## 2023-11-16 VITALS
OXYGEN SATURATION: 97 % | SYSTOLIC BLOOD PRESSURE: 113 MMHG | HEART RATE: 90 BPM | RESPIRATION RATE: 16 BRPM | BODY MASS INDEX: 31.15 KG/M2 | WEIGHT: 230 LBS | DIASTOLIC BLOOD PRESSURE: 64 MMHG | HEIGHT: 72 IN | TEMPERATURE: 98 F
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
## 2023-11-16 DIAGNOSIS — S40.011A CONTUSION OF RIGHT SHOULDER, INITIAL ENCOUNTER: ICD-10-CM

## 2023-11-16 DIAGNOSIS — M25.511 RIGHT SHOULDER PAIN: ICD-10-CM

## 2023-11-16 PROCEDURE — 99284 PR EMERGENCY DEPT VISIT,LEVEL IV: ICD-10-PCS | Mod: ,,, | Performed by: EMERGENCY MEDICINE

## 2023-11-16 PROCEDURE — 99283 EMERGENCY DEPT VISIT LOW MDM: CPT

## 2023-11-16 PROCEDURE — 99284 EMERGENCY DEPT VISIT MOD MDM: CPT | Mod: ,,, | Performed by: EMERGENCY MEDICINE

## 2023-11-16 RX ORDER — HYDROCODONE BITARTRATE AND ACETAMINOPHEN 5; 325 MG/1; MG/1
1 TABLET ORAL EVERY 6 HOURS PRN
Qty: 16 TABLET | Refills: 0 | Status: SHIPPED | OUTPATIENT
Start: 2023-11-16

## 2023-11-16 NOTE — ED PROVIDER NOTES
Encounter Date: 11/16/2023       History     Chief Complaint   Patient presents with    Fall     Patient is a 83-year-old male who tripped and fell last night landing on his right shoulder.  Patient has had right shoulder pain ever since then.  He has difficulty moving that shoulder secondary to pain.  Patient complains of some mild pain to his right hip but is ambulating without difficulty.  No other acute problems or complaints at this time.  Patient states he did not hit his head or get knocked out.      Review of patient's allergies indicates:   Allergen Reactions    Sulfa (sulfonamide antibiotics) Hives     Past Medical History:   Diagnosis Date    Atrial fibrillation     COPD (chronic obstructive pulmonary disease)     Hypertension     Sarcoma      Past Surgical History:   Procedure Laterality Date    ABDOMINAL AORTIC ANEURYSM REPAIR      BACK SURGERY      FLEXOR TENDON REPAIR Right 11/4/2021    Procedure: REPAIR, TENDON, FLEXOR;  Surgeon: Dani Degroot MD;  Location: [facility];  Service: Orthopedics;  Laterality: Right;    SURGICAL REMOVAL OF SARCOMA      TRIGGER FINGER RELEASE Right 11/4/2021    Procedure: RELEASE, TRIGGER FINGER;  Surgeon: Dani Degroot MD;  Location: [facility];  Service: Orthopedics;  Laterality: Right;    VASCULAR SURGERY       Family History   Problem Relation Age of Onset    No Known Problems Mother     No Known Problems Father      Social History     Tobacco Use    Smoking status: Former     Current packs/day: 1.00     Average packs/day: 1 pack/day for 30.0 years (30.0 ttl pk-yrs)     Types: Cigarettes     Passive exposure: Past    Smokeless tobacco: Current     Types: Chew   Substance Use Topics    Alcohol use: Yes    Drug use: Never     Review of Systems   Musculoskeletal:         Right shoulder pain   All other systems reviewed and are negative.      Physical Exam     Initial Vitals [11/16/23 0832]   BP Pulse Resp Temp SpO2   113/64 90 16 97.6 °F (36.4  °C) 97 %      MAP       --         Physical Exam    Nursing note and vitals reviewed.  Constitutional: He appears well-developed and well-nourished.   HENT:   Head: Normocephalic and atraumatic.   Mouth/Throat: Oropharynx is clear and moist.   Eyes: Pupils are equal, round, and reactive to light.   Neck: Neck supple.   Normal range of motion.  Cardiovascular:  Normal rate and regular rhythm.           Pulmonary/Chest: Effort normal and breath sounds normal.   Abdominal: Abdomen is soft. He exhibits no distension.   Musculoskeletal:      Cervical back: Normal range of motion and neck supple.      Comments: There is moderate diffuse tenderness to palpation around right shoulder.  Decreased range of motion secondary to pain.  Neurovascularly intact distally.     Neurological: He is alert.   Skin: Skin is warm. Capillary refill takes less than 2 seconds.   Psychiatric: He has a normal mood and affect.         Medical Screening Exam   See Full Note    ED Course   Procedures  Labs Reviewed - No data to display       Imaging Results              X-Ray Shoulder Trauma Right (Final result)  Result time 11/16/23 09:22:19      Final result by José Antonio Deluca II, MD (11/16/23 09:22:19)                   Impression:      Shoulder arthrosis and calcific tendinitis as described above.      Electronically signed by: José Antonio Deluca  Date:    11/16/2023  Time:    09:22               Narrative:    EXAMINATION:  XR SHOULDER TRAUMA 3 VIEW RIGHT    CLINICAL HISTORY:  Pain in right shoulder    COMPARISON:  None available    TECHNIQUE:  XR SHOULDER TRAUMA 3 VIEW RIGHT    FINDINGS:  No evidence of fracture seen.  The alignment of the joints appears normal.  Mild acromioclavicular joint and mild glenohumeral joint degenerative change is present.  Small amount of calcification overlying the rotator cuff tendon.  No soft tissue abnormality is seen.                                       Medications - No data to display  Medical Decision  Making             ED Course as of 11/16/23 0930   Thu Nov 16, 2023   0902 Medical decision-making:  Differential diagnosis includes fall, contusion of right shoulder, dislocation of right shoulder, fracture of right humerus.  Right shoulder x-ray ordered and interpreted by me. [BB]   0922 Right shoulder x-ray by my interpretation shows no acute fracture or dislocation. [BB]      ED Course User Index  [BB] Yovanny Rubio MD                    Clinical Impression:   Final diagnoses:  [M25.511] Right shoulder pain  [W19.XXXA] Fall, initial encounter (Primary)  [S40.011A] Contusion of right shoulder, initial encounter        ED Disposition Condition    Discharge Stable          ED Prescriptions       Medication Sig Dispense Start Date End Date Auth. Provider    HYDROcodone-acetaminophen (NORCO) 5-325 mg per tablet Take 1 tablet by mouth every 6 (six) hours as needed for Pain. 16 tablet 11/16/2023 -- Yovanny Rubio MD          Follow-up Information       Follow up With Specialties Details Why Contact Info    Jayant Sauer MD Orthopedic Surgery Schedule an appointment as soon as possible for a visit  As needed for right shoulder pain [address]   [address]  Jayant Sauer Md, Orthopedic & Sports Medicine, [facility] [place] [address]  [phone]               Yovanny Rubio MD  11/16/23 3293

## 2023-11-16 NOTE — ED TRIAGE NOTES
PRESENTS TO ER WITH COMPLAINT OF FALL ON 11/15/2023 AND HITTING HIS RIGHT SHOULDER AND RIGHT HIP.

## 2023-11-16 NOTE — DISCHARGE INSTRUCTIONS
Wear sling to help with pain.  Return to emergency department for any worsening or further problems.  Follow up in clinic with your primary care provider or orthopedic doctor if symptoms persist.  Take medication as prescribed.

## 2023-11-17 ENCOUNTER — TELEPHONE (OUTPATIENT)
Dept: EMERGENCY MEDICINE | Facility: HOSPITAL | Age: 83
End: 2023-11-17
Payer: MEDICARE

## 2023-11-21 ENCOUNTER — OFFICE VISIT (OUTPATIENT)
Dept: FAMILY MEDICINE | Facility: CLINIC | Age: 83
End: 2023-11-21
Payer: MEDICARE

## 2023-11-21 VITALS
RESPIRATION RATE: 16 BRPM | HEIGHT: 72 IN | WEIGHT: 223 LBS | HEART RATE: 92 BPM | BODY MASS INDEX: 30.2 KG/M2 | SYSTOLIC BLOOD PRESSURE: 120 MMHG | TEMPERATURE: 98 F | OXYGEN SATURATION: 96 % | DIASTOLIC BLOOD PRESSURE: 64 MMHG

## 2023-11-21 DIAGNOSIS — C49.12 SARCOMA OF LEFT UPPER EXTREMITY: ICD-10-CM

## 2023-11-21 DIAGNOSIS — B37.0 THRUSH: Primary | ICD-10-CM

## 2023-11-21 DIAGNOSIS — I73.9 PERIPHERAL VASCULAR DISEASE OF EXTREMITY: ICD-10-CM

## 2023-11-21 PROCEDURE — 3288F PR FALLS RISK ASSESSMENT DOCUMENTED: ICD-10-PCS | Mod: ,,, | Performed by: FAMILY MEDICINE

## 2023-11-21 PROCEDURE — 1101F PT FALLS ASSESS-DOCD LE1/YR: CPT | Mod: ,,, | Performed by: FAMILY MEDICINE

## 2023-11-21 PROCEDURE — 99213 OFFICE O/P EST LOW 20 MIN: CPT | Mod: ,,, | Performed by: FAMILY MEDICINE

## 2023-11-21 PROCEDURE — 1160F PR REVIEW ALL MEDS BY PRESCRIBER/CLIN PHARMACIST DOCUMENTED: ICD-10-PCS | Mod: ,,, | Performed by: FAMILY MEDICINE

## 2023-11-21 PROCEDURE — 3078F PR MOST RECENT DIASTOLIC BLOOD PRESSURE < 80 MM HG: ICD-10-PCS | Mod: ,,, | Performed by: FAMILY MEDICINE

## 2023-11-21 PROCEDURE — 99213 PR OFFICE/OUTPT VISIT, EST, LEVL III, 20-29 MIN: ICD-10-PCS | Mod: ,,, | Performed by: FAMILY MEDICINE

## 2023-11-21 PROCEDURE — 3078F DIAST BP <80 MM HG: CPT | Mod: ,,, | Performed by: FAMILY MEDICINE

## 2023-11-21 PROCEDURE — 1159F MED LIST DOCD IN RCRD: CPT | Mod: ,,, | Performed by: FAMILY MEDICINE

## 2023-11-21 PROCEDURE — 3288F FALL RISK ASSESSMENT DOCD: CPT | Mod: ,,, | Performed by: FAMILY MEDICINE

## 2023-11-21 PROCEDURE — 1101F PR PT FALLS ASSESS DOC 0-1 FALLS W/OUT INJ PAST YR: ICD-10-PCS | Mod: ,,, | Performed by: FAMILY MEDICINE

## 2023-11-21 PROCEDURE — 1126F PR PAIN SEVERITY QUANTIFIED, NO PAIN PRESENT: ICD-10-PCS | Mod: ,,, | Performed by: FAMILY MEDICINE

## 2023-11-21 PROCEDURE — 3074F SYST BP LT 130 MM HG: CPT | Mod: ,,, | Performed by: FAMILY MEDICINE

## 2023-11-21 PROCEDURE — 1126F AMNT PAIN NOTED NONE PRSNT: CPT | Mod: ,,, | Performed by: FAMILY MEDICINE

## 2023-11-21 PROCEDURE — 1160F RVW MEDS BY RX/DR IN RCRD: CPT | Mod: ,,, | Performed by: FAMILY MEDICINE

## 2023-11-21 PROCEDURE — 1159F PR MEDICATION LIST DOCUMENTED IN MEDICAL RECORD: ICD-10-PCS | Mod: ,,, | Performed by: FAMILY MEDICINE

## 2023-11-21 PROCEDURE — 3074F PR MOST RECENT SYSTOLIC BLOOD PRESSURE < 130 MM HG: ICD-10-PCS | Mod: ,,, | Performed by: FAMILY MEDICINE

## 2023-11-21 RX ORDER — NYSTATIN 100000 [USP'U]/ML
5 SUSPENSION ORAL
Qty: 200 ML | Refills: 0 | Status: SHIPPED | OUTPATIENT
Start: 2023-11-21 | End: 2023-12-01

## 2023-11-21 NOTE — PROGRESS NOTES
Jayant Castro MD        PATIENT NAME: Nathan Hinton  : 1940  DATE: 23  MRN: [id number]      Billing Provider: Jayant Castro MD  Level of Service: IA OFFICE/OUTPT VISIT, EST, LEVL III, 20-29 MIN  Patient PCP Information       Provider PCP Type    Jayant Castro MD General            Reason for Visit / Chief Complaint: Mouth Lesions (Sores in mouth)       Update PCP  Update Chief Complaint         History of Present Illness / Problem Focused Workflow     Nathan Hinton presents to the clinic with Mouth Lesions (Sores in mouth)     Redness and soreness in mouth.  No fever.      Mouth Lesions   Associated symptoms include mouth sores. Pertinent negatives include no fever, no photophobia, no abdominal pain, no constipation, no diarrhea, no nausea, no congestion, no headaches, no rhinorrhea, no sore throat, no stridor, no neck pain, no cough, no wheezing, no rash, no eye discharge and no eye pain.       Review of Systems     Review of Systems   Constitutional:  Negative for activity change, appetite change, fever and unexpected weight change.   HENT:  Positive for mouth sores. Negative for congestion, rhinorrhea, sinus pressure, sinus pain, sore throat and trouble swallowing.    Eyes:  Negative for photophobia, pain, discharge and visual disturbance.   Respiratory:  Negative for cough, chest tightness, wheezing and stridor.    Cardiovascular:  Negative for chest pain, palpitations and leg swelling.   Gastrointestinal:  Negative for abdominal pain, blood in stool, constipation, diarrhea and nausea.   Endocrine: Negative for polydipsia, polyphagia and polyuria.   Genitourinary:  Negative for difficulty urinating, flank pain and hematuria.   Musculoskeletal:  Negative for arthralgias and neck pain.   Skin:  Negative for rash.   Allergic/Immunologic: Negative for food allergies.   Neurological:  Negative for dizziness, tremors, seizures, syncope, weakness (global weakness) and headaches.    Psychiatric/Behavioral:  Negative for behavioral problems, confusion, decreased concentration, dysphoric mood and hallucinations. The patient is not nervous/anxious.         Medical / Social / Family History     Past Medical History:   Diagnosis Date    Atrial fibrillation     COPD (chronic obstructive pulmonary disease)     Hypertension     Sarcoma        Past Surgical History:   Procedure Laterality Date    ABDOMINAL AORTIC ANEURYSM REPAIR      BACK SURGERY      FLEXOR TENDON REPAIR Right 11/4/2021    Procedure: REPAIR, TENDON, FLEXOR;  Surgeon: Dani Degroot MD;  Location: [facility];  Service: Orthopedics;  Laterality: Right;    SURGICAL REMOVAL OF SARCOMA      TRIGGER FINGER RELEASE Right 11/4/2021    Procedure: RELEASE, TRIGGER FINGER;  Surgeon: Dani Degroot MD;  Location: [facility];  Service: Orthopedics;  Laterality: Right;    VASCULAR SURGERY         Social History    reports that he has quit smoking. His smoking use included cigarettes. He has a 30.0 pack-year smoking history. He has been exposed to tobacco smoke. His smokeless tobacco use includes chew. He reports current alcohol use. He reports that he does not use drugs.    Family History  's family history includes No Known Problems in his father and mother.    Medications and Allergies     Medications  No outpatient medications have been marked as taking for the 11/21/23 encounter (Office Visit) with Jayant Castro MD.       Allergies  Review of patient's allergies indicates:   Allergen Reactions    Sulfa (sulfonamide antibiotics) Hives       Physical Examination     Vitals:    11/21/23 1356   BP: 120/64   Pulse: 92   Resp: 16   Temp: 97.9 °F (36.6 °C)     Physical Exam  Constitutional:       General: He is not in acute distress.     Appearance: Normal appearance.   HENT:      Head: Normocephalic.      Right Ear: Tympanic membrane and ear canal normal.      Left Ear: Tympanic membrane and ear canal normal.       Nose: Nose normal.      Mouth/Throat:      Mouth: Mucous membranes are moist.      Pharynx: No oropharyngeal exudate.   Eyes:      Extraocular Movements: Extraocular movements intact.      Pupils: Pupils are equal, round, and reactive to light.   Cardiovascular:      Rate and Rhythm: Normal rate and regular rhythm.      Heart sounds: No murmur heard.  Pulmonary:      Effort: Pulmonary effort is normal.      Breath sounds: Normal breath sounds. No wheezing.   Abdominal:      General: Abdomen is flat. Bowel sounds are normal.      Palpations: Abdomen is soft.      Hernia: No hernia is present.   Musculoskeletal:         General: Normal range of motion.      Cervical back: Normal range of motion and neck supple.      Right lower leg: No edema.      Left lower leg: No edema.   Lymphadenopathy:      Cervical: No cervical adenopathy.   Skin:     General: Skin is warm and dry.      Coloration: Skin is not jaundiced.      Findings: No lesion.   Neurological:      General: No focal deficit present.      Mental Status: He is alert and oriented to person, place, and time.      Cranial Nerves: No cranial nerve deficit.      Gait: Gait normal.   Psychiatric:         Mood and Affect: Mood normal.         Behavior: Behavior normal.         Judgment: Judgment normal.          Assessment and Plan (including Health Maintenance)      Problem List  Smart Sets  Document Outside HM   :    Plan:     1. Sarcoma of left upper extremity      2. Peripheral vascular disease of extremity            Health Maintenance Due   Topic Date Due    Pneumococcal Vaccines (Age 65+) (1 - PCV) Never done    TETANUS VACCINE  Never done    Shingles Vaccine (1 of 2) Never done    RSV Vaccine (Age 60+ and Pregnant patients) (1 - 1-dose 60+ series) Never done    Influenza Vaccine (1) 09/01/2023    COVID-19 Vaccine (3 - 2023-24 season) 09/01/2023       1. Thrush  -     nystatin (MYCOSTATIN) 100,000 unit/mL suspension; Take 5 mLs (500,000 Units total) by mouth 4  (four) times daily with meals and nightly. for 10 days  Dispense: 200 mL; Refill: 0    2. Sarcoma of left upper extremity    3. Peripheral vascular disease of extremity         Health Maintenance Topics with due status: Not Due       Topic Last Completion Date    Lipid Panel 08/03/2020       Future Appointments   Date Time Provider Department Center   12/4/2023  2:15 PM Nitish Gerardo MD [facility]   5/15/2024  1:40 PM Randall Alicea MD [facility]        There are no Patient Instructions on file for this visit.  Follow up if symptoms worsen or fail to improve.     Signature:  Jayant Castro MD      Date of encounter: 11/21/23

## 2024-01-08 ENCOUNTER — HOSPITAL ENCOUNTER (EMERGENCY)
Facility: HOSPITAL | Age: 84
Discharge: HOME | End: 2024-01-08
Attending: EMERGENCY MEDICINE
Payer: MEDICARE

## 2024-01-08 VITALS
HEART RATE: 85 BPM | HEIGHT: 72 IN | DIASTOLIC BLOOD PRESSURE: 66 MMHG | OXYGEN SATURATION: 94 % | RESPIRATION RATE: 19 BRPM | BODY MASS INDEX: 30.48 KG/M2 | TEMPERATURE: 97 F | WEIGHT: 225 LBS | SYSTOLIC BLOOD PRESSURE: 123 MMHG

## 2024-01-08 DIAGNOSIS — R06.02 SHORTNESS OF BREATH: ICD-10-CM

## 2024-01-08 DIAGNOSIS — R19.7 DIARRHEA, UNSPECIFIED TYPE: ICD-10-CM

## 2024-01-08 DIAGNOSIS — J44.9 CHRONIC OBSTRUCTIVE PULMONARY DISEASE, UNSPECIFIED COPD TYPE: Primary | ICD-10-CM

## 2024-01-08 LAB
ALBUMIN SERPL BCP-MCNC: 3.5 G/DL (ref 3.5–5)
ALBUMIN/GLOB SERPL: 0.9 {RATIO}
ALP SERPL-CCNC: 104 U/L (ref 45–115)
ALT SERPL W P-5'-P-CCNC: 23 U/L (ref 16–61)
ANION GAP SERPL CALCULATED.3IONS-SCNC: 10 MMOL/L (ref 7–16)
AST SERPL W P-5'-P-CCNC: 34 U/L (ref 15–37)
BASOPHILS # BLD AUTO: 0.04 K/UL (ref 0–0.2)
BASOPHILS NFR BLD AUTO: 0.5 % (ref 0–1)
BILIRUB SERPL-MCNC: 0.6 MG/DL (ref ?–1.2)
BILIRUB UR QL STRIP: NEGATIVE
BUN SERPL-MCNC: 16 MG/DL (ref 7–18)
BUN/CREAT SERPL: 13 (ref 6–20)
CALCIUM SERPL-MCNC: 9.5 MG/DL (ref 8.5–10.1)
CHLORIDE SERPL-SCNC: 101 MMOL/L (ref 98–107)
CLARITY UR: CLEAR
CO2 SERPL-SCNC: 28 MMOL/L (ref 21–32)
COLOR UR: ABNORMAL
CREAT SERPL-MCNC: 1.25 MG/DL (ref 0.7–1.3)
DIFFERENTIAL METHOD BLD: ABNORMAL
EGFR (NO RACE VARIABLE) (RUSH/TITUS): 57 ML/MIN/1.73M2
EOSINOPHIL # BLD AUTO: 0.37 K/UL (ref 0–0.5)
EOSINOPHIL NFR BLD AUTO: 4.8 % (ref 1–4)
ERYTHROCYTE [DISTWIDTH] IN BLOOD BY AUTOMATED COUNT: 12.6 % (ref 11.5–14.5)
GLOBULIN SER-MCNC: 3.7 G/DL (ref 2–4)
GLUCOSE SERPL-MCNC: 104 MG/DL (ref 74–106)
GLUCOSE UR STRIP-MCNC: NORMAL MG/DL
HCT VFR BLD AUTO: 37.4 % (ref 40–54)
HGB BLD-MCNC: 12.1 G/DL (ref 13.5–18)
IMM GRANULOCYTES # BLD AUTO: 0.07 K/UL (ref 0–0.04)
IMM GRANULOCYTES NFR BLD: 0.9 % (ref 0–0.4)
KETONES UR STRIP-SCNC: NEGATIVE MG/DL
LEUKOCYTE ESTERASE UR QL STRIP: NEGATIVE
LIPASE SERPL-CCNC: 72 U/L (ref 16–77)
LYMPHOCYTES # BLD AUTO: 2.05 K/UL (ref 1–4.8)
LYMPHOCYTES NFR BLD AUTO: 26.8 % (ref 27–41)
MCH RBC QN AUTO: 30.4 PG (ref 27–31)
MCHC RBC AUTO-ENTMCNC: 32.4 G/DL (ref 32–36)
MCV RBC AUTO: 94 FL (ref 80–96)
MONOCYTES # BLD AUTO: 0.91 K/UL (ref 0–0.8)
MONOCYTES NFR BLD AUTO: 11.9 % (ref 2–6)
MPC BLD CALC-MCNC: 9.5 FL (ref 9.4–12.4)
NEUTROPHILS # BLD AUTO: 4.2 K/UL (ref 1.8–7.7)
NEUTROPHILS NFR BLD AUTO: 55.1 % (ref 53–65)
NITRITE UR QL STRIP: NEGATIVE
NRBC # BLD AUTO: 0 X10E3/UL
NRBC, AUTO (.00): 0 %
NT-PROBNP SERPL-MCNC: 614 PG/ML (ref 1–450)
PH UR STRIP: 6.5 PH UNITS
PLATELET # BLD AUTO: 231 K/UL (ref 150–400)
POTASSIUM SERPL-SCNC: 4.4 MMOL/L (ref 3.5–5.1)
PROT SERPL-MCNC: 7.2 G/DL (ref 6.4–8.2)
PROT UR QL STRIP: 10
RBC # BLD AUTO: 3.98 M/UL (ref 4.6–6.2)
RBC # UR STRIP: NEGATIVE /UL
SODIUM SERPL-SCNC: 135 MMOL/L (ref 136–145)
SP GR UR STRIP: 1.01
TROPONIN I SERPL DL<=0.01 NG/ML-MCNC: 45.3 PG/ML
UROBILINOGEN UR STRIP-ACNC: NORMAL MG/DL
WBC # BLD AUTO: 7.64 K/UL (ref 4.5–11)

## 2024-01-08 PROCEDURE — 93005 ELECTROCARDIOGRAM TRACING: CPT

## 2024-01-08 PROCEDURE — 87899 AGENT NOS ASSAY W/OPTIC: CPT | Performed by: EMERGENCY MEDICINE

## 2024-01-08 PROCEDURE — 87046 STOOL CULTR AEROBIC BACT EA: CPT | Performed by: EMERGENCY MEDICINE

## 2024-01-08 PROCEDURE — 81003 URINALYSIS AUTO W/O SCOPE: CPT | Performed by: EMERGENCY MEDICINE

## 2024-01-08 PROCEDURE — 63600175 PHARM REV CODE 636 W HCPCS: Performed by: EMERGENCY MEDICINE

## 2024-01-08 PROCEDURE — 84484 ASSAY OF TROPONIN QUANT: CPT | Performed by: EMERGENCY MEDICINE

## 2024-01-08 PROCEDURE — 99285 EMERGENCY DEPT VISIT HI MDM: CPT | Mod: 25

## 2024-01-08 PROCEDURE — 80053 COMPREHEN METABOLIC PANEL: CPT | Performed by: EMERGENCY MEDICINE

## 2024-01-08 PROCEDURE — 85025 COMPLETE CBC W/AUTO DIFF WBC: CPT | Performed by: EMERGENCY MEDICINE

## 2024-01-08 PROCEDURE — 83880 ASSAY OF NATRIURETIC PEPTIDE: CPT | Performed by: EMERGENCY MEDICINE

## 2024-01-08 PROCEDURE — 96374 THER/PROPH/DIAG INJ IV PUSH: CPT

## 2024-01-08 PROCEDURE — 93010 ELECTROCARDIOGRAM REPORT: CPT | Mod: ,,, | Performed by: INTERNAL MEDICINE

## 2024-01-08 PROCEDURE — 87015 SPECIMEN INFECT AGNT CONCNTJ: CPT | Performed by: EMERGENCY MEDICINE

## 2024-01-08 PROCEDURE — 99284 EMERGENCY DEPT VISIT MOD MDM: CPT | Mod: ,,, | Performed by: EMERGENCY MEDICINE

## 2024-01-08 PROCEDURE — 83690 ASSAY OF LIPASE: CPT | Performed by: EMERGENCY MEDICINE

## 2024-01-08 PROCEDURE — 25000242 PHARM REV CODE 250 ALT 637 W/ HCPCS: Performed by: EMERGENCY MEDICINE

## 2024-01-08 RX ORDER — IPRATROPIUM BROMIDE AND ALBUTEROL SULFATE 2.5; .5 MG/3ML; MG/3ML
3 SOLUTION RESPIRATORY (INHALATION)
Status: COMPLETED | OUTPATIENT
Start: 2024-01-08 | End: 2024-01-08

## 2024-01-08 RX ORDER — DEXAMETHASONE SODIUM PHOSPHATE 4 MG/ML
8 INJECTION, SOLUTION INTRA-ARTICULAR; INTRALESIONAL; INTRAMUSCULAR; INTRAVENOUS; SOFT TISSUE
Status: COMPLETED | OUTPATIENT
Start: 2024-01-08 | End: 2024-01-08

## 2024-01-08 RX ADMIN — DEXAMETHASONE SODIUM PHOSPHATE 8 MG: 4 INJECTION, SOLUTION INTRA-ARTICULAR; INTRALESIONAL; INTRAMUSCULAR; INTRAVENOUS; SOFT TISSUE at 10:01

## 2024-01-08 RX ADMIN — IPRATROPIUM BROMIDE AND ALBUTEROL SULFATE 3 ML: .5; 3 SOLUTION RESPIRATORY (INHALATION) at 10:01

## 2024-01-08 NOTE — ED PROVIDER NOTES
Encounter Date: 1/8/2024       History     Chief Complaint   Patient presents with    Diarrhea     X 3 days    Fatigue     Patient is a 83-year-old male who presents to the emergency department complaining of 3 day history of watery diarrhea.  Patient took a few doses of Bactrim about a week ago for cough.  Patient states he has had problems with Clostridium difficile in the past after taking antibiotics.  Patient is also complaining of some shortness of breath this morning however he has COPD and usually does a nebulizer treatment in the morning but he did not do it today because he was feeling bad.  Patient has a cough productive of some yellowish sputum occasionally.  No nausea, vomiting, or other acute problems or complaints at this time.      Review of patient's allergies indicates:   Allergen Reactions    Sulfa (sulfonamide antibiotics) Hives     Past Medical History:   Diagnosis Date    Atrial fibrillation     COPD (chronic obstructive pulmonary disease)     Hypertension     Sarcoma      Past Surgical History:   Procedure Laterality Date    ABDOMINAL AORTIC ANEURYSM REPAIR      BACK SURGERY      FLEXOR TENDON REPAIR Right 11/4/2021    Procedure: REPAIR, TENDON, FLEXOR;  Surgeon: Dani Degroot MD;  Location: [facility];  Service: Orthopedics;  Laterality: Right;    SURGICAL REMOVAL OF SARCOMA      TRIGGER FINGER RELEASE Right 11/4/2021    Procedure: RELEASE, TRIGGER FINGER;  Surgeon: Dani Degroot MD;  Location: [facility];  Service: Orthopedics;  Laterality: Right;    VASCULAR SURGERY       Family History   Problem Relation Age of Onset    No Known Problems Mother     No Known Problems Father      Social History     Tobacco Use    Smoking status: Former     Current packs/day: 1.00     Average packs/day: 1 pack/day for 30.0 years (30.0 ttl pk-yrs)     Types: Cigarettes     Passive exposure: Past    Smokeless tobacco: Current     Types: Chew   Substance Use Topics    Alcohol use: Yes     Drug use: Never     Review of Systems   Respiratory:  Positive for cough, shortness of breath and wheezing.    Gastrointestinal:  Positive for diarrhea.   All other systems reviewed and are negative.      Physical Exam     Initial Vitals   BP Pulse Resp Temp SpO2   01/08/24 0820 01/08/24 0820 01/08/24 0820 01/08/24 0831 01/08/24 0820   123/66 80 18 97.4 °F (36.3 °C) (!) 94 %      MAP       --                Physical Exam    Nursing note and vitals reviewed.  Constitutional: He appears well-developed and well-nourished.   HENT:   Head: Normocephalic and atraumatic.   Mouth/Throat: Oropharynx is clear and moist.   Eyes: Pupils are equal, round, and reactive to light.   Neck: Neck supple.   Normal range of motion.  Cardiovascular:  Normal rate and regular rhythm.           Pulmonary/Chest: Effort normal. He has wheezes.   Abdominal: Abdomen is soft. He exhibits no distension.   Musculoskeletal:         General: Normal range of motion.      Cervical back: Normal range of motion and neck supple.     Neurological: He is alert.   Skin: Skin is warm. Capillary refill takes less than 2 seconds.   Psychiatric: He has a normal mood and affect.         Medical Screening Exam   See Full Note    ED Course   Procedures  Labs Reviewed   COMPREHENSIVE METABOLIC PANEL - Abnormal; Notable for the following components:       Result Value    Sodium 135 (*)     eGFR 57 (*)     All other components within normal limits   NT-PRO NATRIURETIC PEPTIDE - Abnormal; Notable for the following components:    ProBNP 614 (*)     All other components within normal limits   CBC WITH DIFFERENTIAL - Abnormal; Notable for the following components:    RBC 3.98 (*)     Hemoglobin 12.1 (*)     Hematocrit 37.4 (*)     Lymphocytes % 26.8 (*)     Monocytes % 11.9 (*)     Eosinophils % 4.8 (*)     Immature Granulocytes % 0.9 (*)     Monocytes, Absolute 0.91 (*)     Immature Granulocytes, Absolute 0.07 (*)     All other components within normal limits    TROPONIN I - Normal   LIPASE - Normal   CULTURE, STOOL   CLOSTRIDIOIDES DIFFICILE TOXIN/ANTIGEN WITH REFLEX TO PCR   CBC W/ AUTO DIFFERENTIAL    Narrative:     The following orders were created for panel order CBC auto differential.  Procedure                               Abnormality         Status                     ---------                               -----------         ------                     CBC with Differential[[id number]]       Abnormal            Final result                 Please view results for these tests on the individual orders.   URINALYSIS, REFLEX TO URINE CULTURE          Imaging Results              X-Ray Chest AP Portable (Final result)  Result time 01/08/24 10:40:36      Final result by José Antonio Deluca II, MD (01/08/24 10:40:36)                   Impression:      Findings suggest mild cardiac decompensation and / or pneumonitis.      Electronically signed by: José Antonio Deluca  Date:    01/08/2024  Time:    10:40               Narrative:    EXAMINATION:  XR CHEST AP PORTABLE    CLINICAL HISTORY:  Dyspnea;    COMPARISON:  9 September 2023    TECHNIQUE:  XR CHEST AP PORTABLE    FINDINGS:  The heart and mediastinum are stable in size and configuration.  The pulmonary vascularity is slightly increased with bilateral increased interstitial lung density.  No other lung infiltrates, effusions, pneumothorax or other abnormality is demonstrated.                                       Medications   dexAMETHasone injection 8 mg (8 mg Intravenous Given 1/8/24 1017)   albuterol-ipratropium 2.5 mg-0.5 mg/3 mL nebulizer solution 3 mL (3 mLs Nebulization Given 1/8/24 1016)     Medical Decision Making  Amount and/or Complexity of Data Reviewed  Labs: ordered.  Radiology: ordered.    Risk  Prescription drug management.               ED Course as of 01/08/24 1131   Mon Jan 08, 2024   0933 Medical decision-making:  Differential diagnosis includes diarrhea, gastroenteritis, Clostridium difficile, COPD  exacerbation.  All labs and imaging ordered and interpreted by me. [BB]   1044 CBC is normal. [BB]   1126 CBC is normal except slight anemia.  CMP is normal.  Troponin is normal.  Pro BNP is minimally elevated. [BB]   1127 Lipase is normal. [BB]   1128 Clostridium difficile toxin test is pending that will not return until tomorrow.  Patient and family were counseled that they should [facility].  They are familiar with how to do that. [BB]   1130 On repeat examination patient states his wheezing is better and he no longer feels short of breath after neb treatment. [BB]      ED Course User Index  [BB] Yovanny Rubio MD                             Clinical Impression:   Final diagnoses:  [R06.02] Shortness of breath  [J44.9] Chronic obstructive pulmonary disease, unspecified COPD type (Primary)  [R19.7] Diarrhea, unspecified type        ED Disposition Condition    Discharge Stable          ED Prescriptions    None       Follow-up Information    None          Yovanny Rubio MD  01/08/24 5052

## 2024-01-08 NOTE — DISCHARGE INSTRUCTIONS
Check your Clostridium difficile test results tomorrow.  Return to emergency department for any worsening or further problems.  Take over-the-counter Imodium  for diarrhea.  Rest and drink plenty of clear liquids.  Follow up in clinic with primary care provider in 2-3 days if symptoms persist.

## 2024-01-09 LAB
CAMPYLOBACTER ANTIGEN: NEGATIVE
EHEC (SHIGA TOXIN 1): NEGATIVE
EHEC (SHIGA TOXIN 2): NEGATIVE

## 2024-01-10 LAB — SALM+SHIG+E COLI ETEC STL CULT: NORMAL

## 2024-02-28 ENCOUNTER — OFFICE VISIT (OUTPATIENT)
Dept: FAMILY MEDICINE | Facility: CLINIC | Age: 84
End: 2024-02-28
Payer: MEDICARE

## 2024-02-28 VITALS
WEIGHT: 230 LBS | HEART RATE: 76 BPM | BODY MASS INDEX: 31.15 KG/M2 | HEIGHT: 72 IN | OXYGEN SATURATION: 95 % | RESPIRATION RATE: 18 BRPM | DIASTOLIC BLOOD PRESSURE: 72 MMHG | SYSTOLIC BLOOD PRESSURE: 116 MMHG

## 2024-02-28 DIAGNOSIS — M25.511 ACUTE PAIN OF BOTH SHOULDERS: Primary | ICD-10-CM

## 2024-02-28 DIAGNOSIS — M25.512 ACUTE PAIN OF BOTH SHOULDERS: Primary | ICD-10-CM

## 2024-02-28 PROCEDURE — 3074F SYST BP LT 130 MM HG: CPT | Mod: ,,, | Performed by: NURSE PRACTITIONER

## 2024-02-28 PROCEDURE — 99213 OFFICE O/P EST LOW 20 MIN: CPT | Mod: 25,,, | Performed by: NURSE PRACTITIONER

## 2024-02-28 PROCEDURE — 1101F PT FALLS ASSESS-DOCD LE1/YR: CPT | Mod: ,,, | Performed by: NURSE PRACTITIONER

## 2024-02-28 PROCEDURE — 1126F AMNT PAIN NOTED NONE PRSNT: CPT | Mod: ,,, | Performed by: NURSE PRACTITIONER

## 2024-02-28 PROCEDURE — 96372 THER/PROPH/DIAG INJ SC/IM: CPT | Mod: ,,, | Performed by: NURSE PRACTITIONER

## 2024-02-28 PROCEDURE — 3078F DIAST BP <80 MM HG: CPT | Mod: ,,, | Performed by: NURSE PRACTITIONER

## 2024-02-28 PROCEDURE — 1160F RVW MEDS BY RX/DR IN RCRD: CPT | Mod: ,,, | Performed by: NURSE PRACTITIONER

## 2024-02-28 PROCEDURE — 3288F FALL RISK ASSESSMENT DOCD: CPT | Mod: ,,, | Performed by: NURSE PRACTITIONER

## 2024-02-28 PROCEDURE — 1159F MED LIST DOCD IN RCRD: CPT | Mod: ,,, | Performed by: NURSE PRACTITIONER

## 2024-02-28 RX ORDER — DEXAMETHASONE SODIUM PHOSPHATE 4 MG/ML
8 INJECTION, SOLUTION INTRA-ARTICULAR; INTRALESIONAL; INTRAMUSCULAR; INTRAVENOUS; SOFT TISSUE
Status: COMPLETED | OUTPATIENT
Start: 2024-02-28 | End: 2024-02-28

## 2024-02-28 RX ADMIN — DEXAMETHASONE SODIUM PHOSPHATE 8 MG: 4 INJECTION, SOLUTION INTRA-ARTICULAR; INTRALESIONAL; INTRAMUSCULAR; INTRAVENOUS; SOFT TISSUE at 09:02

## 2024-02-28 NOTE — PROGRESS NOTES
Subjective:       Patient ID: Nathan Hinton is a 83 y.o. male.    Chief Complaint: Shoulder Pain (Patient stated that he has been having discomfort near his shoulder blade for a couple of days now and that when he breaths it hurts )    NO cough, no congestion. NO SOB.     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  GERD, controlled on prilosec      Peripheral vascular disease of extremity 11/30/2016        Review of Systems   Constitutional:  Negative for chills, fatigue and fever.   HENT:  Negative for congestion, hearing loss, postnasal drip, rhinorrhea, sore throat, tinnitus and voice change.    Respiratory:  Negative for apnea, cough, choking, chest tightness and shortness of breath.    Cardiovascular:  Negative for chest pain, palpitations and leg swelling.   Gastrointestinal:  Negative for abdominal pain, constipation, diarrhea and nausea.   Genitourinary:  Negative for difficulty urinating.   Musculoskeletal:  Positive for arthralgias and myalgias.   Neurological:  Negative for dizziness, syncope, weakness and headaches.   Psychiatric/Behavioral:  Negative for sleep disturbance.         Objective:      Vitals:    02/28/24 0928   BP: 116/72   Pulse: 76   Resp: 18      Physical Exam  Vitals reviewed.   Constitutional:       Appearance: Normal appearance.   HENT:      Head: Normocephalic.      Right Ear: External ear normal.      Left Ear: External ear normal.      Nose: Nose normal.      Mouth/Throat:      Mouth:  Mucous membranes are moist.      Pharynx: Oropharynx is clear.   Eyes:      Pupils: Pupils are equal, round, and reactive to light.   Cardiovascular:      Rate and Rhythm: Normal rate and regular rhythm.      Pulses: Normal pulses.      Heart sounds: Normal heart sounds.   Pulmonary:      Effort: Pulmonary effort is normal.      Breath sounds: Normal breath sounds. No stridor. No decreased breath sounds, wheezing, rhonchi or rales.   Abdominal:      General: Bowel sounds are normal.      Palpations: Abdomen is soft.   Musculoskeletal:         General: Normal range of motion.      Cervical back: Normal range of motion.   Skin:     General: Skin is warm and dry.   Neurological:      Mental Status: He is alert and oriented to person, place, and time.   Psychiatric:         Mood and Affect: Mood normal.         Behavior: Behavior normal.       Assessment:       1. Acute pain of both shoulders        Plan:     Problem List Items Addressed This Visit          Orthopedic    Acute pain of both shoulders - Primary    Current Assessment & Plan     IM steroid today.  Follow up in 3-5 days if symptoms persist.  GO to ED with increasing SOB or difficulty breathing.  Continue inhaler use.         Relevant Medications    dexAMETHasone injection 8 mg       Health Maintenance:  Health Maintenance Topics with due status: Not Due       Topic Last Completion Date    Lipid Panel 08/03/2020           Anai Olmos   [facility]   2/28/24

## 2024-02-28 NOTE — ASSESSMENT & PLAN NOTE
IM steroid today.  Follow up in 3-5 days if symptoms persist.  GO to ED with increasing SOB or difficulty breathing.  Continue inhaler use.

## 2024-04-10 DIAGNOSIS — H90.3 SENSORY HEARING LOSS, BILATERAL: Primary | ICD-10-CM

## 2024-04-19 DIAGNOSIS — M54.50 LOW BACK PAIN: Primary | ICD-10-CM

## 2024-04-20 ENCOUNTER — HOSPITAL ENCOUNTER (EMERGENCY)
Facility: HOSPITAL | Age: 84
Discharge: HOME | End: 2024-04-20
Attending: EMERGENCY MEDICINE
Payer: OTHER GOVERNMENT

## 2024-04-20 VITALS
HEIGHT: 72 IN | TEMPERATURE: 98 F | SYSTOLIC BLOOD PRESSURE: 132 MMHG | BODY MASS INDEX: 31.15 KG/M2 | OXYGEN SATURATION: 94 % | WEIGHT: 230 LBS | DIASTOLIC BLOOD PRESSURE: 64 MMHG | HEART RATE: 107 BPM | RESPIRATION RATE: 21 BRPM

## 2024-04-20 DIAGNOSIS — J44.9 CHRONIC OBSTRUCTIVE PULMONARY DISEASE, UNSPECIFIED COPD TYPE: ICD-10-CM

## 2024-04-20 DIAGNOSIS — J44.1 COPD EXACERBATION: Primary | ICD-10-CM

## 2024-04-20 DIAGNOSIS — R06.02 SHORTNESS OF BREATH: ICD-10-CM

## 2024-04-20 LAB
ALBUMIN SERPL BCP-MCNC: 3.4 G/DL (ref 3.5–5)
ALBUMIN/GLOB SERPL: 0.9 {RATIO}
ALP SERPL-CCNC: 101 U/L (ref 45–115)
ALT SERPL W P-5'-P-CCNC: 19 U/L (ref 16–61)
ANION GAP SERPL CALCULATED.3IONS-SCNC: 7 MMOL/L (ref 7–16)
AST SERPL W P-5'-P-CCNC: 25 U/L (ref 15–37)
BASOPHILS # BLD AUTO: 0.03 K/UL (ref 0–0.2)
BASOPHILS NFR BLD AUTO: 0.4 % (ref 0–1)
BASOPHILS NFR BLD MANUAL: 1 % (ref 0–1)
BILIRUB SERPL-MCNC: 0.5 MG/DL (ref ?–1.2)
BUN SERPL-MCNC: 16 MG/DL (ref 7–18)
BUN/CREAT SERPL: 14 (ref 6–20)
CALCIUM SERPL-MCNC: 9.5 MG/DL (ref 8.5–10.1)
CHLORIDE SERPL-SCNC: 102 MMOL/L (ref 98–107)
CO2 SERPL-SCNC: 31 MMOL/L (ref 21–32)
CREAT SERPL-MCNC: 1.18 MG/DL (ref 0.7–1.3)
DIFFERENTIAL METHOD BLD: ABNORMAL
EGFR (NO RACE VARIABLE) (RUSH/TITUS): 61 ML/MIN/1.73M2
EOSINOPHIL # BLD AUTO: 0.38 K/UL (ref 0–0.5)
EOSINOPHIL NFR BLD AUTO: 5.4 % (ref 1–4)
EOSINOPHIL NFR BLD MANUAL: 8 % (ref 1–4)
ERYTHROCYTE [DISTWIDTH] IN BLOOD BY AUTOMATED COUNT: 12.7 % (ref 11.5–14.5)
GLOBULIN SER-MCNC: 3.9 G/DL (ref 2–4)
GLUCOSE SERPL-MCNC: 113 MG/DL (ref 74–106)
HCT VFR BLD AUTO: 34.3 % (ref 40–54)
HGB BLD-MCNC: 11 G/DL (ref 13.5–18)
IMM GRANULOCYTES # BLD AUTO: 0.03 K/UL (ref 0–0.04)
IMM GRANULOCYTES NFR BLD: 0.4 % (ref 0–0.4)
LYMPHOCYTES # BLD AUTO: 2.03 K/UL (ref 1–4.8)
LYMPHOCYTES NFR BLD AUTO: 28.6 % (ref 27–41)
LYMPHOCYTES NFR BLD MANUAL: 21 % (ref 27–41)
MCH RBC QN AUTO: 30.5 PG (ref 27–31)
MCHC RBC AUTO-ENTMCNC: 32.1 G/DL (ref 32–36)
MCV RBC AUTO: 95 FL (ref 80–96)
MONOCYTES # BLD AUTO: 0.77 K/UL (ref 0–0.8)
MONOCYTES NFR BLD AUTO: 10.9 % (ref 2–6)
MONOCYTES NFR BLD MANUAL: 10 % (ref 2–6)
MPC BLD CALC-MCNC: 9.6 FL (ref 9.4–12.4)
NEUTROPHILS # BLD AUTO: 3.85 K/UL (ref 1.8–7.7)
NEUTROPHILS NFR BLD AUTO: 54.3 % (ref 53–65)
NEUTS BAND NFR BLD MANUAL: 1 % (ref 1–5)
NEUTS SEG NFR BLD MANUAL: 59 % (ref 50–62)
NRBC # BLD AUTO: 0 X10E3/UL
NRBC, AUTO (.00): 0 %
NT-PROBNP SERPL-MCNC: 534 PG/ML (ref 1–450)
PLATELET # BLD AUTO: 212 K/UL (ref 150–400)
PLATELET MORPHOLOGY: NORMAL
POTASSIUM SERPL-SCNC: 4.1 MMOL/L (ref 3.5–5.1)
PROT SERPL-MCNC: 7.3 G/DL (ref 6.4–8.2)
RBC # BLD AUTO: 3.61 M/UL (ref 4.6–6.2)
RBC MORPH BLD: NORMAL
SODIUM SERPL-SCNC: 136 MMOL/L (ref 136–145)
TROPONIN I SERPL DL<=0.01 NG/ML-MCNC: 37.2 PG/ML
WBC # BLD AUTO: 7.09 K/UL (ref 4.5–11)

## 2024-04-20 PROCEDURE — 84484 ASSAY OF TROPONIN QUANT: CPT | Performed by: EMERGENCY MEDICINE

## 2024-04-20 PROCEDURE — 63600175 PHARM REV CODE 636 W HCPCS: Performed by: EMERGENCY MEDICINE

## 2024-04-20 PROCEDURE — 25000242 PHARM REV CODE 250 ALT 637 W/ HCPCS: Performed by: EMERGENCY MEDICINE

## 2024-04-20 PROCEDURE — 96374 THER/PROPH/DIAG INJ IV PUSH: CPT

## 2024-04-20 PROCEDURE — 80053 COMPREHEN METABOLIC PANEL: CPT | Performed by: EMERGENCY MEDICINE

## 2024-04-20 PROCEDURE — 93005 ELECTROCARDIOGRAM TRACING: CPT

## 2024-04-20 PROCEDURE — 93010 ELECTROCARDIOGRAM REPORT: CPT | Mod: ,,, | Performed by: HOSPITALIST

## 2024-04-20 PROCEDURE — 99284 EMERGENCY DEPT VISIT MOD MDM: CPT | Mod: ,,, | Performed by: EMERGENCY MEDICINE

## 2024-04-20 PROCEDURE — 85025 COMPLETE CBC W/AUTO DIFF WBC: CPT | Performed by: EMERGENCY MEDICINE

## 2024-04-20 PROCEDURE — 83880 ASSAY OF NATRIURETIC PEPTIDE: CPT | Performed by: EMERGENCY MEDICINE

## 2024-04-20 PROCEDURE — 99285 EMERGENCY DEPT VISIT HI MDM: CPT | Mod: 25

## 2024-04-20 RX ORDER — BUDESONIDE 0.5 MG/2ML
0.5 INHALANT ORAL EVERY 12 HOURS
Qty: 360 ML | Refills: 3 | Status: SHIPPED | OUTPATIENT
Start: 2024-04-20 | End: 2025-04-20

## 2024-04-20 RX ORDER — PREDNISONE 20 MG/1
60 TABLET ORAL
Status: COMPLETED | OUTPATIENT
Start: 2024-04-20 | End: 2024-04-20

## 2024-04-20 RX ORDER — DEXAMETHASONE SODIUM PHOSPHATE 4 MG/ML
8 INJECTION, SOLUTION INTRA-ARTICULAR; INTRALESIONAL; INTRAMUSCULAR; INTRAVENOUS; SOFT TISSUE
Status: COMPLETED | OUTPATIENT
Start: 2024-04-20 | End: 2024-04-20

## 2024-04-20 RX ORDER — PREDNISONE 20 MG/1
40 TABLET ORAL DAILY
Qty: 8 TABLET | Refills: 0 | Status: SHIPPED | OUTPATIENT
Start: 2024-04-20 | End: 2024-04-24

## 2024-04-20 RX ORDER — PREDNISONE 20 MG/1
40 TABLET ORAL DAILY
Qty: 8 TABLET | Refills: 0 | Status: SHIPPED | OUTPATIENT
Start: 2024-04-20 | End: 2024-04-20

## 2024-04-20 RX ORDER — BUDESONIDE AND FORMOTEROL FUMARATE DIHYDRATE 160; 4.5 UG/1; UG/1
2 AEROSOL RESPIRATORY (INHALATION) EVERY 12 HOURS
Qty: 10.2 G | Refills: 0 | Status: SHIPPED | OUTPATIENT
Start: 2024-04-20 | End: 2024-05-15

## 2024-04-20 RX ORDER — IPRATROPIUM BROMIDE AND ALBUTEROL SULFATE 2.5; .5 MG/3ML; MG/3ML
9 SOLUTION RESPIRATORY (INHALATION)
Status: COMPLETED | OUTPATIENT
Start: 2024-04-20 | End: 2024-04-20

## 2024-04-20 RX ADMIN — PREDNISONE 60 MG: 20 TABLET ORAL at 07:04

## 2024-04-20 RX ADMIN — IPRATROPIUM BROMIDE AND ALBUTEROL SULFATE 9 ML: .5; 3 SOLUTION RESPIRATORY (INHALATION) at 06:04

## 2024-04-20 RX ADMIN — DEXAMETHASONE SODIUM PHOSPHATE 8 MG: 4 INJECTION, SOLUTION INTRA-ARTICULAR; INTRALESIONAL; INTRAMUSCULAR; INTRAVENOUS; SOFT TISSUE at 06:04

## 2024-04-20 NOTE — ED PROVIDER NOTES
Encounter Date: 4/20/2024    SCRIBE #1 NOTE: I, Citlaly Andrade, am scribing for, and in the presence of,  Yovanny Rubio MD.       History     Chief Complaint   Patient presents with    Shortness of Breath    Chest Pain     Pt is an 83 y.o. Male that presents to the ED with c/o of SOB and chest pain. Pt states he sh SOB and right sided chest pain. This pain has been happening for 4-5 days pt says. Pt wears 2 L nasal cannula at home and breathing treatment twice a day. PMHx consists of COPD, A-Fib, and HTN.     The history is provided by the patient and a relative. No  was used.     Review of patient's allergies indicates:   Allergen Reactions    Sulfa (sulfonamide antibiotics) Hives     Past Medical History:   Diagnosis Date    CHF (congestive heart failure) 04/08/2023    EF  35%    Chronic atrial fibrillation     Chronic hypoxic respiratory failure, on home oxygen therapy     COPD (chronic obstructive pulmonary disease)     Sarcoma      Past Surgical History:   Procedure Laterality Date    ABDOMINAL AORTIC ANEURYSM REPAIR      BACK SURGERY      FLEXOR TENDON REPAIR Right 11/4/2021    Procedure: REPAIR, TENDON, FLEXOR;  Surgeon: Dani Degroot MD;  Location: [facility];  Service: Orthopedics;  Laterality: Right;    SURGICAL REMOVAL OF SARCOMA      TRIGGER FINGER RELEASE Right 11/4/2021    Procedure: RELEASE, TRIGGER FINGER;  Surgeon: Dani Degroot MD;  Location: [facility];  Service: Orthopedics;  Laterality: Right;    VASCULAR SURGERY       Family History   Problem Relation Name Age of Onset    No Known Problems Mother      No Known Problems Father       Social History     Tobacco Use    Smoking status: Former     Current packs/day: 1.00     Average packs/day: 1 pack/day for 30.0 years (30.0 ttl pk-yrs)     Types: Cigarettes     Passive exposure: Past    Smokeless tobacco: Current     Types: Chew   Substance Use Topics    Alcohol use: Yes    Drug use: Never     Review of  Systems   Constitutional:  Negative for fever.   Respiratory:  Positive for cough, chest tightness and shortness of breath.    Cardiovascular:  Positive for chest pain. Negative for leg swelling.   Gastrointestinal:  Negative for diarrhea.   All other systems reviewed and are negative.      Physical Exam     Initial Vitals [04/20/24 1735]   BP Pulse Resp Temp SpO2   (!) 154/86 82 15 98 °F (36.7 °C) 97 %      MAP       --         Physical Exam    Nursing note and vitals reviewed.  HENT:   Head: Normocephalic and atraumatic.   Mouth/Throat: Oropharynx is clear and moist.   Eyes: Pupils are equal, round, and reactive to light.   Neck: Neck supple.   Normal range of motion.  Cardiovascular:  Normal rate and regular rhythm.           Pulmonary/Chest: Effort normal. He has wheezes.   Diffuse wheezing    Abdominal: Abdomen is soft. He exhibits no distension.   Musculoskeletal:         General: Normal range of motion.      Cervical back: Normal range of motion and neck supple.     Neurological: He is alert.   Skin: Skin is warm. Capillary refill takes less than 2 seconds.   Psychiatric: He has a normal mood and affect.         ED Course   Procedures  Labs Reviewed   COMPREHENSIVE METABOLIC PANEL - Abnormal; Notable for the following components:       Result Value    Glucose 113 (*)     Albumin 3.4 (*)     All other components within normal limits   NT-PRO NATRIURETIC PEPTIDE - Abnormal; Notable for the following components:    ProBNP 534 (*)     All other components within normal limits   CBC WITH DIFFERENTIAL - Abnormal; Notable for the following components:    RBC 3.61 (*)     Hemoglobin 11.0 (*)     Hematocrit 34.3 (*)     Monocytes % 10.9 (*)     Eosinophils % 5.4 (*)     All other components within normal limits   MANUAL DIFFERENTIAL - Abnormal; Notable for the following components:    Lymphocytes, Man % 21 (*)     Monocytes, Man % 10 (*)     Eosinophils, Man % 8 (*)     All other components within normal limits    TROPONIN I - Normal   CBC W/ AUTO DIFFERENTIAL    Narrative:     The following orders were created for panel order CBC auto differential.  Procedure                               Abnormality         Status                     ---------                               -----------         ------                     CBC with Differential[[id number]]       Abnormal            Final result               Manual Differential[[id number]]         Abnormal            Final result                 Please view results for these tests on the individual orders.     EKG Readings: (Independently Interpreted)   Heart Rate: 89 bpm.   Atrial fibrillation   Abnormal ECG       Imaging Results              X-Ray Chest AP Portable (Final result)  Result time 04/20/24 18:26:53      Final result by Haroon Campo MD (04/20/24 18:26:53)                   Impression:      No acute cardiopulmonary process.    Place of service: [facility]      Electronically signed by: Haroon Campo  Date:    04/20/2024  Time:    18:26               Narrative:    EXAMINATION:  XR CHEST AP PORTABLE    CLINICAL HISTORY:  CHF;    TECHNIQUE:  AP portable    COMPARISON:  Prior radiograph 01/08/2024    FINDINGS:  The cardiomediastinal silhouette is within normal limits. The lungs are clear. There is no pneumothorax or pleural effusion.    There is no acute osseous or soft tissue abnormality.                                       Medications   predniSONE tablet 60 mg (has no administration in time range)   albuterol-ipratropium 2.5 mg-0.5 mg/3 mL nebulizer solution 9 mL (9 mLs Nebulization Given 4/20/24 1813)   dexAMETHasone injection 8 mg (8 mg Intravenous Given 4/20/24 1814)     Medical Decision Making  MDM    Patient presents for emergent evaluation of acute shortness breath that poses a threat to life and/or bodily function.    In the ED patient found to have acute exacerbation of COPD.    I ordered labs and personally reviewed them.  Labs  significant for proBNP 534.  No JVD.  No pulmonary edema on chest x-ray no rales on physical exam not indicative of acute CHF.  Patient has longstanding COPD.  He was not able to get his budesonide a month ago.  Has had shortness breath over the past few days worse for the past 2 days associated with occasional cough but no fever or chills no productive cough.  Not suspecting infection will not treat with antibiotics.  I ordered X-rays and personally reviewed them and reviewed the radiologist interpretation.  Xray significant for chest x-ray no acute abnormality.      Discharge MDM  Patient was managed in the ED with IV dexamethasone albuterol prednisone.    The response to treatment was improved.    Patient was discharged in stable condition.  Detailed return precautions discussed.     As per AVS  Putting out a prescription of budesonide for the inhaler and for the nebulizer.  Would rather you take the nebulizer but check around with multiple pharmacies and try to find wanted he was that your insurance will pay for in the true pharmacy has a version available that your insurance will pay for    Follow back up with your primary care and with Dr. Alicea.    Start prescription prednisone    Keep using your regular medications including your breathing treatments    Amount and/or Complexity of Data Reviewed  Labs: ordered.  Radiology: ordered. Decision-making details documented in ED Course.    Risk  Prescription drug management.              Attending Attestation:           Physician Attestation for Scribe:  Physician Attestation Statement for Scribe #1: I, Yovanny Rubio MD, reviewed documentation, as scribed by Citlaly Andrade in my presence, and it is both accurate and complete.             ED Course as of 04/20/24 1938   Sat Apr 20, 2024   8016 Medical decision-making:  Differential diagnosis includes shortness breath, acute COPD exacerbation, pneumonia, bronchitis, STEMI, NSTEMI, CHF.  All labs and imaging ordered and  interpreted by me. [BB]   1749 X-Ray Chest AP Portable [CM]   1755 Sign out.  H/o COPD.  Sob several days worse today.  Really tight. Vague chest tightness.  Labs pending.   [PK]      ED Course User Index  [BB] Yovanny Rubio MD  [CM] Citlaly Andrade  [PK] Haroon King MD                           Clinical Impression:  Final diagnoses:  [R06.02] Shortness of breath  [J44.1] COPD exacerbation (Primary)          ED Disposition Condition    Discharge Stable          ED Prescriptions       Medication Sig Dispense Start Date End Date Auth. Provider    budesonide (PULMICORT) 0.5 mg/2 mL nebulizer solution Take 2 mLs (0.5 mg total) by nebulization every 12 (twelve) hours. Controller 360 mL 4/20/2024 4/20/2025 Haroon King MD    budesonide-formoterol 160-4.5 mcg (SYMBICORT) 160-4.5 mcg/actuation HFAA Inhale 2 puffs into the lungs every 12 (twelve) hours. Controller 10.2 g 4/20/2024 4/20/2025 Haroon King MD    predniSONE (DELTASONE) 20 MG tablet Take 2 tablets (40 mg total) by mouth once daily. for 4 days 8 tablet 4/20/2024 4/24/2024 Haroon King MD          Follow-up Information       Follow up With Specialties Details Why Contact Info    Jayant Castro MD Family Medicine, Emergency Medicine Schedule an appointment as soon as possible for a visit  As needed [address] [facility]  [facility] [address]  [phone]      Randall Alicea MD Pulmonary Disease Schedule an appointment as soon as possible for a visit   [address]  [facility]  [facility] [address]  [phone]               Haroon King MD  04/20/24 1938

## 2024-04-20 NOTE — ED TRIAGE NOTES
Patient presents to the ED with c/o shortness of breath and right sided chest pain that radiates into his shoulder blades. Patient wears 2L nasal cannula at home for COPD

## 2024-04-22 ENCOUNTER — CLINICAL SUPPORT (OUTPATIENT)
Dept: AUDIOLOGY | Facility: CLINIC | Age: 84
End: 2024-04-22
Payer: OTHER GOVERNMENT

## 2024-04-22 ENCOUNTER — OFFICE VISIT (OUTPATIENT)
Dept: OTOLARYNGOLOGY | Facility: CLINIC | Age: 84
End: 2024-04-22
Payer: OTHER GOVERNMENT

## 2024-04-22 VITALS — HEIGHT: 72 IN | BODY MASS INDEX: 31.15 KG/M2 | WEIGHT: 230 LBS

## 2024-04-22 DIAGNOSIS — H90.3 SENSORY HEARING LOSS, BILATERAL: ICD-10-CM

## 2024-04-22 DIAGNOSIS — H90.3 SENSORY HEARING LOSS, BILATERAL: Primary | ICD-10-CM

## 2024-04-22 DIAGNOSIS — H90.2 CONDUCTIVE HEARING LOSS, UNSPECIFIED LATERALITY: Primary | ICD-10-CM

## 2024-04-22 DIAGNOSIS — H61.22 HEARING LOSS DUE TO CERUMEN IMPACTION, LEFT: ICD-10-CM

## 2024-04-22 DIAGNOSIS — H93.19 TINNITUS, UNSPECIFIED LATERALITY: ICD-10-CM

## 2024-04-22 PROCEDURE — 99212 OFFICE O/P EST SF 10 MIN: CPT | Mod: PBBFAC | Performed by: AUDIOLOGIST

## 2024-04-22 PROCEDURE — 69210 REMOVE IMPACTED EAR WAX UNI: CPT | Mod: S$PBB,,, | Performed by: OTOLARYNGOLOGY

## 2024-04-22 PROCEDURE — 99214 OFFICE O/P EST MOD 30 MIN: CPT | Mod: PBBFAC,25 | Performed by: OTOLARYNGOLOGY

## 2024-04-22 PROCEDURE — 99499 UNLISTED E&M SERVICE: CPT | Mod: S$PBB,,, | Performed by: OTOLARYNGOLOGY

## 2024-04-22 PROCEDURE — 69210 REMOVE IMPACTED EAR WAX UNI: CPT | Mod: PBBFAC | Performed by: OTOLARYNGOLOGY

## 2024-04-22 PROCEDURE — 92557 COMPREHENSIVE HEARING TEST: CPT | Mod: PBBFAC | Performed by: AUDIOLOGIST

## 2024-04-22 PROCEDURE — 99212 OFFICE O/P EST SF 10 MIN: CPT | Mod: PBBFAC,25

## 2024-04-22 PROCEDURE — 99204 OFFICE O/P NEW MOD 45 MIN: CPT | Mod: 25,S$PBB,, | Performed by: OTOLARYNGOLOGY

## 2024-04-22 NOTE — PROGRESS NOTES
Subjective:       Patient ID: Nathan Hinton is a 83 y.o. male.    Chief Complaint: Hearing Loss (Bilateral hearing loss. Hearing test done. )    Hearing Loss:    Associated symptoms: Tinnitus.      Review of Systems   HENT:  Positive for hearing loss and tinnitus.    All other systems reviewed and are negative.      Objective:      Physical Exam  General: NAD  Head: Normocephalic, atraumatic, no facial asymmetry/normal strength,  Ears: Both auricules normal in appearance, w/o deformities tympanic membranes normal external auditory canals  severe left wax impaction   Nose: External nose w/o deformities normal turbinates no drainage or inflammation  Oral Cavity: Lips, gums, floor of mouth, tongue hard palate, and buccal mucosa without mass/lesion  Oropharynx: Mucosa pink and moist, soft palate, posterior pharynx and oropharyngeal wall without mass/lesion  Neck: Supple, symmetric, trachea midline, no palpable mass/lesion, no palpable cervical lymphadenopathy  Skin: Warm and dry, no concerning lesions  Respiratory: Respirations even, unlabored    Procedure: Binocular microscopy with removal of cerumen impaction using microsurgical instrumentation.  After explaining the procedure and obtaining verbal assent,  the left external auditory canal visualized with the 250 mm focal length lens through the operating microscope. The obstructing cerumen was removed with microsurgical instrumentation to reveal normal and healthy external auditory canal. The patient tolerated this procedure well without complication.    Assessment:       1. Conductive hearing loss, unspecified laterality    2. Sensory hearing loss, bilateral    3. Tinnitus, unspecified laterality    4. Hearing loss due to cerumen impaction, left        Plan:       Discussed and interpreted audio in great detail rec hearing aids

## 2024-04-22 NOTE — PROGRESS NOTES
Consult:  Patient ref[place] by Dr Manuel Bermeo/[facility]  [place]  Patient preference : [facility]   Status: ordered Yes  [place]: No    Will contact patient upon device arrival and schedule fitting.

## 2024-04-24 LAB
OHS QRS DURATION: 76 MS
OHS QTC CALCULATION: 397 MS

## 2024-05-15 ENCOUNTER — OFFICE VISIT (OUTPATIENT)
Dept: PULMONOLOGY | Facility: CLINIC | Age: 84
End: 2024-05-15
Payer: OTHER GOVERNMENT

## 2024-05-15 VITALS
WEIGHT: 226.38 LBS | OXYGEN SATURATION: 92 % | RESPIRATION RATE: 18 BRPM | HEIGHT: 72 IN | BODY MASS INDEX: 30.66 KG/M2 | DIASTOLIC BLOOD PRESSURE: 62 MMHG | HEART RATE: 85 BPM | SYSTOLIC BLOOD PRESSURE: 119 MMHG

## 2024-05-15 DIAGNOSIS — J44.1 CHRONIC OBSTRUCTIVE PULMONARY DISEASE WITH ACUTE EXACERBATION: Primary | Chronic | ICD-10-CM

## 2024-05-15 PROCEDURE — 99213 OFFICE O/P EST LOW 20 MIN: CPT | Mod: S$PBB,,, | Performed by: INTERNAL MEDICINE

## 2024-05-15 PROCEDURE — 99214 OFFICE O/P EST MOD 30 MIN: CPT | Mod: PBBFAC | Performed by: INTERNAL MEDICINE

## 2024-05-15 RX ORDER — THEOPHYLLINE 300 MG/1
300 TABLET, EXTENDED RELEASE ORAL 2 TIMES DAILY
Qty: 60 TABLET | Refills: 11 | Status: SHIPPED | OUTPATIENT
Start: 2024-05-15 | End: 2025-05-15

## 2024-05-15 RX ORDER — MAGNESIUM OXIDE 420 MG/1
TABLET ORAL
COMMUNITY
Start: 2024-04-19

## 2024-05-15 RX ORDER — ALBUTEROL SULFATE 90 UG/1
1-2 AEROSOL, METERED RESPIRATORY (INHALATION) EVERY 4 HOURS PRN
Qty: 18 G | Refills: 5 | Status: SHIPPED | OUTPATIENT
Start: 2024-05-15

## 2024-05-15 NOTE — ASSESSMENT & PLAN NOTE
Patient with severe COPD seems to be doing reasonably well today.  He has been in the hospital once he is on multiple inhalers will were going to do stopped the inhalers do Pulmicort twice a day nebulized in the albuterol DuoNeb 4 times a day and start Carmine

## 2024-05-15 NOTE — PROGRESS NOTES
Subjective:       Patient ID: Nathan Hinton is a 84 y.o. male.    Chief Complaint: Follow-up    Follow-up  This is a chronic problem. The current episode started more than 1 month ago. The problem has been unchanged. Pertinent negatives include no abdominal pain, arthralgias, chest pain, chills, congestion, headaches or rash.     Past Medical History:   Diagnosis Date    CHF (congestive heart failure) 04/08/2023    EF  35%    Chronic atrial fibrillation     Chronic hypoxic respiratory failure, on home oxygen therapy     COPD (chronic obstructive pulmonary disease)     Sarcoma      Past Surgical History:   Procedure Laterality Date    ABDOMINAL AORTIC ANEURYSM REPAIR      BACK SURGERY      FLEXOR TENDON REPAIR Right 11/4/2021    Procedure: REPAIR, TENDON, FLEXOR;  Surgeon: Dani Degroot MD;  Location: [facility];  Service: Orthopedics;  Laterality: Right;    SURGICAL REMOVAL OF SARCOMA      TRIGGER FINGER RELEASE Right 11/4/2021    Procedure: RELEASE, TRIGGER FINGER;  Surgeon: Dani Degroot MD;  Location: [facility];  Service: Orthopedics;  Laterality: Right;    VASCULAR SURGERY       Family History   Problem Relation Name Age of Onset    No Known Problems Mother      No Known Problems Father       Review of patient's allergies indicates:   Allergen Reactions    Sulfa (sulfonamide antibiotics) Hives      Social History     Tobacco Use    Smoking status: Former     Current packs/day: 1.00     Average packs/day: 1 pack/day for 30.0 years (30.0 ttl pk-yrs)     Types: Cigarettes     Passive exposure: Past    Smokeless tobacco: Current     Types: Chew   Substance Use Topics    Alcohol use: Yes    Drug use: Never      Review of Systems   Constitutional:  Negative for chills, activity change and night sweats.   HENT:  Negative for congestion and ear pain.    Eyes:  Negative for redness and itching.   Cardiovascular:  Negative for chest pain and palpitations.   Musculoskeletal:  Negative for  arthralgias and back pain.   Skin:  Negative for rash.   Gastrointestinal:  Negative for abdominal pain and abdominal distention.   Neurological:  Negative for dizziness and headaches.   Hematological:  Negative for adenopathy. Does not bruise/bleed easily.   Psychiatric/Behavioral:  Negative for confusion. The patient is not nervous/anxious.        Objective:      Physical Exam   Constitutional: He is oriented to person, place, and time. He appears well-developed and well-nourished.   HENT:   Head: Normocephalic.   Nose: Nose normal.   Mouth/Throat: Oropharynx is clear and moist.   Neck: No JVD present. No thyromegaly present.   Cardiovascular: Normal rate, regular rhythm, normal heart sounds and intact distal pulses.   Pulmonary/Chest: Normal expansion, hyperinflation, symmetric chest wall expansion, effort normal and breath sounds normal.   Abdominal: Soft. Bowel sounds are normal.   Musculoskeletal:         General: Normal range of motion.      Cervical back: Normal range of motion and neck supple.   Lymphadenopathy: No supraclavicular adenopathy is present.     He has no cervical adenopathy.   Neurological: He is alert and oriented to person, place, and time. He has normal reflexes.   Skin: Skin is warm and dry.   Psychiatric: He has a normal mood and affect. His behavior is normal.     Personal Diagnostic Review  none pertinent        5/15/2024     1:45 PM 4/22/2024     9:31 AM 4/20/2024     7:50 PM 4/20/2024     6:50 PM 4/20/2024     6:35 PM 4/20/2024     6:13 PM 4/20/2024     5:35 PM   Pulmonary Function Tests   SpO2 92 %  94 % 99 % 99 % 98 % 97 %   Height 6' (1.829 m) 6' (1.829 m)     6' (1.829 m)   Weight 102.7 kg (226 lb 6.4 oz) 104.3 kg (230 lb)     104.3 kg (230 lb)   BMI (Calculated) 30.7 31.2     31.2         Assessment:       1. Chronic obstructive pulmonary disease with acute exacerbation        Outpatient Encounter Medications as of 5/15/2024   Medication Sig Dispense Refill    amlodipine besylate  (AMLODIPINE ORAL) Take 5 mg by mouth Daily.      atorvastatin (LIPITOR) 40 MG tablet Take 40 mg by mouth every evening.      budesonide (PULMICORT) 0.5 mg/2 mL nebulizer solution Take 2 mLs (0.5 mg total) by nebulization every 12 (twelve) hours. Controller 360 mL 3    ELIQUIS 2.5 mg Tab TAKE 1 TABLET BY MOUTH TWICE DAILY AS DIRECTED 60 tablet 2    ipratropium (ATROVENT) 0.02 % nebulizer solution Take 2.5 mLs (500 mcg total) by nebulization every 8 (eight) hours. Rescue 675 mL 3    magnesium oxide 420 mg Tab       montelukast (SINGULAIR) 10 mg tablet Take 10 mg by mouth every evening.      multivit-min/ferrous fumarate (MULTI VITAMIN ORAL) Take by mouth.      omega-3 fatty acids/fish oil (FISH OIL-OMEGA-3 FATTY ACIDS) 300-1,000 mg capsule Take 1 capsule by mouth once daily.      omeprazole (PRILOSEC) 20 MG capsule Take 20 mg by mouth.      [DISCONTINUED] albuterol (PROVENTIL/VENTOLIN HFA) 90 mcg/actuation inhaler Inhale into the lungs.      [DISCONTINUED] budesonide-formoterol 160-4.5 mcg (SYMBICORT) 160-4.5 mcg/actuation HFAA Inhale 2 puffs into the lungs every 12 (twelve) hours. Controller 10.2 g 0    [DISCONTINUED] budesonide-glycopyr-formoterol (BREZTRI AEROSPHERE) 160-9-4.8 mcg/actuation HFAA Inhale 2 puffs into the lungs 2 (two) times a day. 10.7 g 11    [DISCONTINUED] umeclidinium-vilanteroL (ANORO ELLIPTA) 62.5-25 mcg/actuation DsDv Inhale into the lungs. Controller      albuterol (PROVENTIL/VENTOLIN HFA) 90 mcg/actuation inhaler Inhale 1-2 puffs into the lungs every 4 (four) hours as needed for Wheezing. 18 g 5    metoprolol succinate (TOPROL-XL) 25 MG 24 hr tablet Take 0.5 tablets (12.5 mg total) by mouth once daily. 15 tablet 11    mirabegron (MYRBETRIQ) 25 mg Tb24 ER tablet Take 1 tablet (25 mg total) by mouth once daily. 30 tablet 11    [] predniSONE (DELTASONE) 20 MG tablet Take 2 tablets (40 mg total) by mouth once daily. for 4 days 8 tablet 0    theophylline (THEODUR) 300 MG 12 hr tablet Take 1  tablet (300 mg total) by mouth 2 (two) times daily. 60 tablet 11    [DISCONTINUED] aspirin (ECOTRIN) 81 MG EC tablet Take 81 mg by mouth once daily.      [DISCONTINUED] budesonide (PULMICORT) 0.5 mg/2 mL nebulizer solution Take 2 mLs (0.5 mg total) by nebulization every 12 (twelve) hours. Controller 360 mL 3    [DISCONTINUED] HYDROcodone-acetaminophen (NORCO) 5-325 mg per tablet Take 1 tablet by mouth every 6 (six) hours as needed for Pain. 16 tablet 0     No facility-administered encounter medications on file as of 5/15/2024.     No orders of the defined types were placed in this encounter.      Plan:       Problem List Items Addressed This Visit          Pulmonary    COPD (chronic obstructive pulmonary disease) - Primary (Chronic)      Patient with severe COPD seems to be doing reasonably well today.  He has been in the hospital once he is on multiple inhalers will were going to do stopped the inhalers do Pulmicort twice a day nebulized in the albuterol DuoNeb 4 times a day and start Carmine

## 2024-05-24 ENCOUNTER — HOSPITAL ENCOUNTER (EMERGENCY)
Facility: HOSPITAL | Age: 84
Discharge: HOME | End: 2024-05-24
Attending: EMERGENCY MEDICINE
Payer: OTHER GOVERNMENT

## 2024-05-24 VITALS
TEMPERATURE: 98 F | HEART RATE: 88 BPM | DIASTOLIC BLOOD PRESSURE: 65 MMHG | WEIGHT: 226 LBS | SYSTOLIC BLOOD PRESSURE: 121 MMHG | HEIGHT: 72 IN | BODY MASS INDEX: 30.61 KG/M2 | OXYGEN SATURATION: 95 % | RESPIRATION RATE: 20 BRPM

## 2024-05-24 DIAGNOSIS — R51.9 NONINTRACTABLE EPISODIC HEADACHE, UNSPECIFIED HEADACHE TYPE: Primary | ICD-10-CM

## 2024-05-24 DIAGNOSIS — R53.1 GENERAL WEAKNESS: ICD-10-CM

## 2024-05-24 LAB
ALBUMIN SERPL BCP-MCNC: 3.3 G/DL (ref 3.5–5)
ALBUMIN/GLOB SERPL: 0.9 {RATIO}
ALP SERPL-CCNC: 89 U/L (ref 45–115)
ALT SERPL W P-5'-P-CCNC: 21 U/L (ref 16–61)
ANION GAP SERPL CALCULATED.3IONS-SCNC: 10 MMOL/L (ref 7–16)
APTT PPP: 30.6 SECONDS (ref 25.2–37.3)
AST SERPL W P-5'-P-CCNC: 30 U/L (ref 15–37)
BASOPHILS # BLD AUTO: 0.03 K/UL (ref 0–0.2)
BASOPHILS NFR BLD AUTO: 0.5 % (ref 0–1)
BILIRUB SERPL-MCNC: 0.5 MG/DL (ref ?–1.2)
BILIRUB UR QL STRIP: NEGATIVE
BUN SERPL-MCNC: 19 MG/DL (ref 7–18)
BUN/CREAT SERPL: 16 (ref 6–20)
CALCIUM SERPL-MCNC: 9.5 MG/DL (ref 8.5–10.1)
CHLORIDE SERPL-SCNC: 96 MMOL/L (ref 98–107)
CLARITY UR: CLEAR
CO2 SERPL-SCNC: 31 MMOL/L (ref 21–32)
COLOR UR: YELLOW
CREAT SERPL-MCNC: 1.16 MG/DL (ref 0.7–1.3)
DIFFERENTIAL METHOD BLD: ABNORMAL
EGFR (NO RACE VARIABLE) (RUSH/TITUS): 62 ML/MIN/1.73M2
EOSINOPHIL # BLD AUTO: 0.34 K/UL (ref 0–0.5)
EOSINOPHIL NFR BLD AUTO: 5.3 % (ref 1–4)
ERYTHROCYTE [DISTWIDTH] IN BLOOD BY AUTOMATED COUNT: 13.2 % (ref 11.5–14.5)
ERYTHROCYTE [SEDIMENTATION RATE] IN BLOOD BY WESTERGREN METHOD: 48 MM/HR (ref 0–30)
GLOBULIN SER-MCNC: 3.8 G/DL (ref 2–4)
GLUCOSE SERPL-MCNC: 94 MG/DL (ref 74–106)
GLUCOSE UR STRIP-MCNC: NORMAL MG/DL
HCT VFR BLD AUTO: 37.1 % (ref 40–54)
HGB BLD-MCNC: 11.9 G/DL (ref 13.5–18)
IMM GRANULOCYTES # BLD AUTO: 0.03 K/UL (ref 0–0.04)
IMM GRANULOCYTES NFR BLD: 0.5 % (ref 0–0.4)
INR BLD: 1.2
KETONES UR STRIP-SCNC: NEGATIVE MG/DL
LEUKOCYTE ESTERASE UR QL STRIP: NEGATIVE
LYMPHOCYTES # BLD AUTO: 1.63 K/UL (ref 1–4.8)
LYMPHOCYTES NFR BLD AUTO: 25.6 % (ref 27–41)
MAGNESIUM SERPL-MCNC: 1.6 MG/DL (ref 1.7–2.3)
MCH RBC QN AUTO: 30.5 PG (ref 27–31)
MCHC RBC AUTO-ENTMCNC: 32.1 G/DL (ref 32–36)
MCV RBC AUTO: 95.1 FL (ref 80–96)
MONOCYTES # BLD AUTO: 0.8 K/UL (ref 0–0.8)
MONOCYTES NFR BLD AUTO: 12.6 % (ref 2–6)
MPC BLD CALC-MCNC: 9.5 FL (ref 9.4–12.4)
NEUTROPHILS # BLD AUTO: 3.53 K/UL (ref 1.8–7.7)
NEUTROPHILS NFR BLD AUTO: 55.5 % (ref 53–65)
NITRITE UR QL STRIP: NEGATIVE
NRBC # BLD AUTO: 0 X10E3/UL
NRBC, AUTO (.00): 0 %
PH UR STRIP: 6 PH UNITS
PLATELET # BLD AUTO: 218 K/UL (ref 150–400)
POTASSIUM SERPL-SCNC: 4.6 MMOL/L (ref 3.5–5.1)
PROT SERPL-MCNC: 7.1 G/DL (ref 6.4–8.2)
PROT UR QL STRIP: 10
PROTHROMBIN TIME: 15.1 SECONDS (ref 11.7–14.7)
RBC # BLD AUTO: 3.9 M/UL (ref 4.6–6.2)
RBC # UR STRIP: NEGATIVE /UL
SODIUM SERPL-SCNC: 132 MMOL/L (ref 136–145)
SP GR UR STRIP: 1.02
TROPONIN I SERPL DL<=0.01 NG/ML-MCNC: 52.3 PG/ML
UROBILINOGEN UR STRIP-ACNC: NORMAL MG/DL
WBC # BLD AUTO: 6.36 K/UL (ref 4.5–11)

## 2024-05-24 PROCEDURE — 63600175 PHARM REV CODE 636 W HCPCS: Performed by: EMERGENCY MEDICINE

## 2024-05-24 PROCEDURE — 81003 URINALYSIS AUTO W/O SCOPE: CPT | Performed by: EMERGENCY MEDICINE

## 2024-05-24 PROCEDURE — 96374 THER/PROPH/DIAG INJ IV PUSH: CPT

## 2024-05-24 PROCEDURE — 93010 ELECTROCARDIOGRAM REPORT: CPT | Mod: ,,, | Performed by: INTERNAL MEDICINE

## 2024-05-24 PROCEDURE — 36415 COLL VENOUS BLD VENIPUNCTURE: CPT | Performed by: EMERGENCY MEDICINE

## 2024-05-24 PROCEDURE — 93005 ELECTROCARDIOGRAM TRACING: CPT

## 2024-05-24 PROCEDURE — 96361 HYDRATE IV INFUSION ADD-ON: CPT

## 2024-05-24 PROCEDURE — 84484 ASSAY OF TROPONIN QUANT: CPT | Performed by: EMERGENCY MEDICINE

## 2024-05-24 PROCEDURE — 85730 THROMBOPLASTIN TIME PARTIAL: CPT | Performed by: EMERGENCY MEDICINE

## 2024-05-24 PROCEDURE — 25000003 PHARM REV CODE 250: Performed by: EMERGENCY MEDICINE

## 2024-05-24 PROCEDURE — 85651 RBC SED RATE NONAUTOMATED: CPT | Performed by: EMERGENCY MEDICINE

## 2024-05-24 PROCEDURE — 85025 COMPLETE CBC W/AUTO DIFF WBC: CPT | Performed by: EMERGENCY MEDICINE

## 2024-05-24 PROCEDURE — 96375 TX/PRO/DX INJ NEW DRUG ADDON: CPT

## 2024-05-24 PROCEDURE — 85610 PROTHROMBIN TIME: CPT | Performed by: EMERGENCY MEDICINE

## 2024-05-24 PROCEDURE — 83735 ASSAY OF MAGNESIUM: CPT | Performed by: EMERGENCY MEDICINE

## 2024-05-24 PROCEDURE — 99285 EMERGENCY DEPT VISIT HI MDM: CPT | Mod: 25

## 2024-05-24 PROCEDURE — 80053 COMPREHEN METABOLIC PANEL: CPT | Performed by: EMERGENCY MEDICINE

## 2024-05-24 RX ORDER — KETOROLAC TROMETHAMINE 15 MG/ML
15 INJECTION, SOLUTION INTRAMUSCULAR; INTRAVENOUS
Status: COMPLETED | OUTPATIENT
Start: 2024-05-24 | End: 2024-05-24

## 2024-05-24 RX ORDER — DOXYCYCLINE 100 MG/1
100 CAPSULE ORAL 2 TIMES DAILY
Qty: 20 CAPSULE | Refills: 0 | Status: SHIPPED | OUTPATIENT
Start: 2024-05-24 | End: 2024-06-03

## 2024-05-24 RX ORDER — METHYLPREDNISOLONE 4 MG/1
TABLET ORAL
Qty: 21 TABLET | Refills: 0 | Status: SHIPPED | OUTPATIENT
Start: 2024-05-24

## 2024-05-24 RX ORDER — METHOCARBAMOL 500 MG/1
500-1000 TABLET, FILM COATED ORAL 3 TIMES DAILY
Qty: 30 TABLET | Refills: 0 | Status: SHIPPED | OUTPATIENT
Start: 2024-05-24 | End: 2024-05-29

## 2024-05-24 RX ORDER — ONDANSETRON HYDROCHLORIDE 2 MG/ML
4 INJECTION, SOLUTION INTRAVENOUS
Status: COMPLETED | OUTPATIENT
Start: 2024-05-24 | End: 2024-05-24

## 2024-05-24 RX ADMIN — KETOROLAC TROMETHAMINE 15 MG: 15 INJECTION, SOLUTION INTRAMUSCULAR; INTRAVENOUS at 12:05

## 2024-05-24 RX ADMIN — SODIUM CHLORIDE 500 ML: 9 INJECTION, SOLUTION INTRAVENOUS at 12:05

## 2024-05-24 RX ADMIN — ONDANSETRON 4 MG: 2 INJECTION INTRAMUSCULAR; INTRAVENOUS at 12:05

## 2024-05-24 NOTE — DISCHARGE INSTRUCTIONS
Follow-up with your primary care.  May consider repeating blood work in 1-2 weeks to check ESR /estimated sedimentation rate which could indicate possible arteritis if  it is increasing     Follow-up with neurology.      Return the ER if symptoms worsen or new symptoms develop     Use prescription muscle relaxer Robaxin cautiously.  Can cause sedation.      Use warm heat as needed     Start prescription steroids Medrol Dosepak     Also starting prescription antibiotic doxycycline for  potential sinus involvement

## 2024-05-24 NOTE — ED PROVIDER NOTES
Encounter Date: 5/24/2024    SCRIBE #1 NOTE: I, Citlaly Andrade, am scribing for, and in the presence of,  Haroon King MD.       History     Chief Complaint   Patient presents with    Headache    Neck Pain    Dizziness    Cough     Pt is an 84 y.o. Male that presents to the ED with c/o of Headache,Dizziness and Cough. Pt stated he has been feeling pain in his shoulder that runs up the right side of his neck and goes behind his eye then to the side of his head which cause headaches.This stared about a week ago the pt reports. Pt's relative states he has not been eating or drinking, and has the gitter's. Pt also states he has Mhx of A-Fib which he has been seen by Dr. Gerardo. Also he states he has COPD. Pt is on O2 24/7 when at home. Pt said the side of his head aches and causes his eyes to water. PMHx consist of CHF, and Chronic hypoxic respiratory failure, on home oxygen therapy.    The history is provided by the patient and a relative. No  was used.     Review of patient's allergies indicates:   Allergen Reactions    Sulfa (sulfonamide antibiotics) Hives     Past Medical History:   Diagnosis Date    CHF (congestive heart failure) 04/08/2023    EF  35%    Chronic atrial fibrillation     Chronic hypoxic respiratory failure, on home oxygen therapy     COPD (chronic obstructive pulmonary disease)     Sarcoma      Past Surgical History:   Procedure Laterality Date    ABDOMINAL AORTIC ANEURYSM REPAIR      BACK SURGERY      FLEXOR TENDON REPAIR Right 11/4/2021    Procedure: REPAIR, TENDON, FLEXOR;  Surgeon: Dani Degroot MD;  Location: [facility];  Service: Orthopedics;  Laterality: Right;    SURGICAL REMOVAL OF SARCOMA      TRIGGER FINGER RELEASE Right 11/4/2021    Procedure: RELEASE, TRIGGER FINGER;  Surgeon: Dani Degroot MD;  Location: [facility];  Service: Orthopedics;  Laterality: Right;    VASCULAR SURGERY       Family History   Problem Relation Name Age of  Onset    No Known Problems Mother      No Known Problems Father       Social History     Tobacco Use    Smoking status: Former     Current packs/day: 1.00     Average packs/day: 1 pack/day for 30.0 years (30.0 ttl pk-yrs)     Types: Cigarettes     Passive exposure: Past    Smokeless tobacco: Current     Types: Chew   Substance Use Topics    Alcohol use: Yes    Drug use: Never     Review of Systems   Respiratory:  Positive for cough.    Gastrointestinal:  Positive for nausea.   Neurological:  Positive for dizziness and weakness.   All other systems reviewed and are negative.      Physical Exam     Initial Vitals [05/24/24 1118]   BP Pulse Resp Temp SpO2   110/71 92 20 98.4 °F (36.9 °C) (!) 88 %      MAP       --         Physical Exam    Nursing note and vitals reviewed.  Constitutional: He appears well-developed and well-nourished.   HENT:   Head: Normocephalic and atraumatic.   Eyes: EOM are normal. Pupils are equal, round, and reactive to light.   Neck: Neck supple. No thyromegaly present. No JVD present.   Normal range of motion.  Cardiovascular:  Normal rate, regular rhythm, normal heart sounds and intact distal pulses.           No murmur heard.  Pulmonary/Chest: No stridor. No respiratory distress. He has no wheezes.   Course Breath sounds.   Abdominal: Abdomen is soft. Bowel sounds are normal. He exhibits no distension. There is no abdominal tenderness.   Musculoskeletal:         General: No tenderness or edema. Normal range of motion.      Cervical back: Normal range of motion and neck supple.     Lymphadenopathy:     He has no cervical adenopathy.   Neurological: He is alert and oriented to person, place, and time. He has normal strength. No cranial nerve deficit or sensory deficit. GCS score is 15. GCS eye subscore is 4. GCS verbal subscore is 5. GCS motor subscore is 6.   Skin: Skin is warm and dry. Capillary refill takes less than 2 seconds. No rash noted.   Psychiatric: He has a normal mood and affect.          ED Course   Procedures  Labs Reviewed   SEDIMENTATION RATE, AUTOMATED - Abnormal; Notable for the following components:       Result Value    ESR Westergren 48 (*)     All other components within normal limits   COMPREHENSIVE METABOLIC PANEL - Abnormal; Notable for the following components:    Sodium 132 (*)     Chloride 96 (*)     BUN 19 (*)     Albumin 3.3 (*)     All other components within normal limits   PROTIME-INR - Abnormal; Notable for the following components:    PT 15.1 (*)     All other components within normal limits   MAGNESIUM - Abnormal; Notable for the following components:    Magnesium 1.6 (*)     All other components within normal limits   URINALYSIS, REFLEX TO URINE CULTURE - Abnormal; Notable for the following components:    Protein, UA 10 (*)     All other components within normal limits   CBC WITH DIFFERENTIAL - Abnormal; Notable for the following components:    RBC 3.90 (*)     Hemoglobin 11.9 (*)     Hematocrit 37.1 (*)     Lymphocytes % 25.6 (*)     Monocytes % 12.6 (*)     Eosinophils % 5.3 (*)     Immature Granulocytes % 0.5 (*)     All other components within normal limits   APTT - Normal   TROPONIN I - Normal   CBC W/ AUTO DIFFERENTIAL    Narrative:     The following orders were created for panel order CBC auto differential.  Procedure                               Abnormality         Status                     ---------                               -----------         ------                     CBC with Differential[[id number]]       Abnormal            Final result                 Please view results for these tests on the individual orders.     EKG Readings: (Independently Interpreted)   Heart Rate: 89 bpm.   Atrial fibrillation   Inferior ST elevation, CONSIDER ACUTE INFARCT   Lateral ST abnormality may be due to myocardial ischemia   Abnormal ECG       Imaging Results              CT Head Without Contrast (Final result)  Result time 05/24/24 13:38:12      Final result by  Guillermo Elizalde DO (05/24/24 13:38:12)                   Impression:      No acute intracranial findings.      Electronically signed by: Guillermo Elizalde  Date:    05/24/2024  Time:    13:38               Narrative:    EXAMINATION:  CT HEAD WITHOUT CONTRAST    CLINICAL HISTORY:  Headache, new or worsening (Age >= 50y);    TECHNIQUE:  Multiplanar CT imaging from the vertex to skull the skull base was performed without contrast.    Dose reduction: The CT exam was performed using one or more dose reduction techniques: Automatic exposure control, automated adjustment of the MA and/or kVP according to patient size, or use of iterative reconstruction technique.    COMPARISON:  4/7/23    FINDINGS:  Global brain parenchymal volume loss, similar    There are similar appearing hypoattenuations scattered throughout the periventricular white matter of the frontal and parietal lobes, which are nonspecific although can be seen in chronic small vessel ischemic change    There is no CT evidence of acute intracranial hemorrhage or large territorial infarct. No epidural/subdural hematomas.    There is no evidence of hydrocephalus, midline shift or mass effect.    Scalp, paranasal sinuses, and mastoid air cells are normal.                                       Medications   sodium chloride 0.9% bolus 500 mL 500 mL (0 mLs Intravenous Stopped 5/24/24 1251)   ketorolac injection 15 mg (15 mg Intravenous Given 5/24/24 1212)   ondansetron injection 4 mg (4 mg Intravenous Given 5/24/24 1213)     Medical Decision Making  MDM    Patient presents for emergent evaluation of acute  headache that poses a threat to life and/or bodily function.    In the ED patient found to have acute  tension headache..    I ordered labs and personally reviewed them.  Labs significant for    ESR less than 50 unlikely to be arthritis.  Will treat with  Medrol Dosepak empirically.  Also some tenderness to the right neck will treat musculoskeletal pain with  Robaxin and treat with empiric antibiotic.  Will have patient follow up with Neurology.  Unlikely to be myocardial infarction with troponin being normal  I ordered CT scan and personally reviewed it and reviewed the radiologist interpretation.  CT significant for  CT head no acute abnormality.      Discharge MDM  Patient was managed in the ED with IV  Toradol.    The response to treatment was  improved.    Patient was discharged in stable condition.  Detailed return precautions discussed.     Amount and/or Complexity of Data Reviewed  Labs: ordered.  Radiology: ordered.    Risk  Prescription drug management.              Attending Attestation:           Physician Attestation for Scribe:  Physician Attestation Statement for Scribe #1: I, Haroon King MD, reviewed documentation, as scribed by Citlaly Andrade in my presence, and it is both accurate and complete.                                    Clinical Impression:  Final diagnoses:  [R53.1] General weakness  [R51.9] Nonintractable episodic headache, unspecified headache type (Primary)          ED Disposition Condition    Discharge Stable          ED Prescriptions       Medication Sig Dispense Start Date End Date Auth. Provider    methylPREDNISolone (MEDROL DOSEPACK) 4 mg tablet Take as directed 21 tablet 5/24/2024 -- Haroon King MD    doxycycline (VIBRAMYCIN) 100 MG Cap Take 1 capsule (100 mg total) by mouth 2 (two) times daily. for 10 days 20 capsule 5/24/2024 6/3/2024 Haroon King MD    methocarbamoL (ROBAXIN) 500 MG Tab Take 1-2 tablets (500-1,000 mg total) by mouth 3 (three) times daily. for 5 days 30 tablet 5/24/2024 5/29/2024 Haroon King MD          Follow-up Information       Follow up With Specialties Details Why Contact Info    [facility] - Emergency Department Emergency Medicine Go to  As needed, If symptoms worsen [address] [place] [id number] [phone]    Jayant Castro MD [facility]  Medicine, Emergency Medicine Go to   [address] [facility]  [facility] [address]  [phone]               Haroon King MD  05/24/24 2768

## 2024-05-24 NOTE — ED TRIAGE NOTES
Patient arrives to ED and states that he has been sick for a week with multiple complaints. Patient states a bad headache on the left side of his head behind his eyeball that also radiates down his neck. Patient also complaining of dizziness and nausea as well.

## 2024-05-25 LAB
OHS QRS DURATION: 76 MS
OHS QTC CALCULATION: 395 MS

## 2024-05-28 ENCOUNTER — CLINICAL SUPPORT (OUTPATIENT)
Dept: AUDIOLOGY | Facility: CLINIC | Age: 84
End: 2024-05-28
Payer: OTHER GOVERNMENT

## 2024-05-28 DIAGNOSIS — H90.3 SENSORY HEARING LOSS, BILATERAL: Primary | ICD-10-CM

## 2024-06-18 ENCOUNTER — HOSPITAL ENCOUNTER (EMERGENCY)
Facility: HOSPITAL | Age: 84
Discharge: HOME | End: 2024-06-18
Payer: MEDICARE

## 2024-06-18 VITALS
OXYGEN SATURATION: 95 % | BODY MASS INDEX: 30.07 KG/M2 | TEMPERATURE: 97 F | WEIGHT: 222 LBS | SYSTOLIC BLOOD PRESSURE: 131 MMHG | HEIGHT: 72 IN | RESPIRATION RATE: 15 BRPM | HEART RATE: 94 BPM | DIASTOLIC BLOOD PRESSURE: 66 MMHG

## 2024-06-18 DIAGNOSIS — R06.02 SOB (SHORTNESS OF BREATH): ICD-10-CM

## 2024-06-18 DIAGNOSIS — J20.9 ACUTE BRONCHITIS, UNSPECIFIED ORGANISM: Primary | ICD-10-CM

## 2024-06-18 DIAGNOSIS — J44.1 COPD EXACERBATION: ICD-10-CM

## 2024-06-18 LAB
ALBUMIN SERPL BCP-MCNC: 3.1 G/DL (ref 3.5–5)
ALBUMIN/GLOB SERPL: 0.8 {RATIO}
ALP SERPL-CCNC: 87 U/L (ref 45–115)
ALT SERPL W P-5'-P-CCNC: 19 U/L (ref 16–61)
ANION GAP SERPL CALCULATED.3IONS-SCNC: 11 MMOL/L (ref 7–16)
AST SERPL W P-5'-P-CCNC: 23 U/L (ref 15–37)
BASOPHILS # BLD AUTO: 0.04 K/UL (ref 0–0.2)
BASOPHILS NFR BLD AUTO: 0.6 % (ref 0–1)
BILIRUB SERPL-MCNC: 0.6 MG/DL (ref ?–1.2)
BUN SERPL-MCNC: 18 MG/DL (ref 7–18)
BUN/CREAT SERPL: 13 (ref 6–20)
CALCIUM SERPL-MCNC: 9.1 MG/DL (ref 8.5–10.1)
CHLORIDE SERPL-SCNC: 95 MMOL/L (ref 98–107)
CO2 SERPL-SCNC: 28 MMOL/L (ref 21–32)
CREAT SERPL-MCNC: 1.37 MG/DL (ref 0.7–1.3)
DIFFERENTIAL METHOD BLD: ABNORMAL
EGFR (NO RACE VARIABLE) (RUSH/TITUS): 51 ML/MIN/1.73M2
EOSINOPHIL # BLD AUTO: 0.49 K/UL (ref 0–0.5)
EOSINOPHIL NFR BLD AUTO: 7.1 % (ref 1–4)
ERYTHROCYTE [DISTWIDTH] IN BLOOD BY AUTOMATED COUNT: 12.7 % (ref 11.5–14.5)
GLOBULIN SER-MCNC: 3.9 G/DL (ref 2–4)
GLUCOSE SERPL-MCNC: 117 MG/DL (ref 74–106)
HCT VFR BLD AUTO: 35.7 % (ref 40–54)
HGB BLD-MCNC: 11.6 G/DL (ref 13.5–18)
IMM GRANULOCYTES # BLD AUTO: 0 K/UL (ref 0–0.04)
IMM GRANULOCYTES NFR BLD: 0 % (ref 0–0.4)
LACTATE SERPL-SCNC: 1.2 MMOL/L (ref 0.4–2)
LYMPHOCYTES # BLD AUTO: 1.71 K/UL (ref 1–4.8)
LYMPHOCYTES NFR BLD AUTO: 24.6 % (ref 27–41)
MCH RBC QN AUTO: 30.1 PG (ref 27–31)
MCHC RBC AUTO-ENTMCNC: 32.5 G/DL (ref 32–36)
MCV RBC AUTO: 92.7 FL (ref 80–96)
MONOCYTES # BLD AUTO: 0.83 K/UL (ref 0–0.8)
MONOCYTES NFR BLD AUTO: 12 % (ref 2–6)
MPC BLD CALC-MCNC: 10.2 FL (ref 9.4–12.4)
NEUTROPHILS # BLD AUTO: 3.87 K/UL (ref 1.8–7.7)
NEUTROPHILS NFR BLD AUTO: 55.7 % (ref 53–65)
NRBC # BLD AUTO: 0 X10E3/UL
NRBC, AUTO (.00): 0 %
NT-PROBNP SERPL-MCNC: 262 PG/ML (ref 1–450)
PLATELET # BLD AUTO: 220 K/UL (ref 150–400)
POTASSIUM SERPL-SCNC: 4.3 MMOL/L (ref 3.5–5.1)
PROT SERPL-MCNC: 7 G/DL (ref 6.4–8.2)
RBC # BLD AUTO: 3.85 M/UL (ref 4.6–6.2)
SODIUM SERPL-SCNC: 130 MMOL/L (ref 136–145)
WBC # BLD AUTO: 6.94 K/UL (ref 4.5–11)

## 2024-06-18 PROCEDURE — 83880 ASSAY OF NATRIURETIC PEPTIDE: CPT

## 2024-06-18 PROCEDURE — 25000242 PHARM REV CODE 250 ALT 637 W/ HCPCS

## 2024-06-18 PROCEDURE — 36415 COLL VENOUS BLD VENIPUNCTURE: CPT

## 2024-06-18 PROCEDURE — 96365 THER/PROPH/DIAG IV INF INIT: CPT

## 2024-06-18 PROCEDURE — 80053 COMPREHEN METABOLIC PANEL: CPT

## 2024-06-18 PROCEDURE — 96375 TX/PRO/DX INJ NEW DRUG ADDON: CPT

## 2024-06-18 PROCEDURE — 93010 ELECTROCARDIOGRAM REPORT: CPT | Mod: ,,, | Performed by: INTERNAL MEDICINE

## 2024-06-18 PROCEDURE — 96367 TX/PROPH/DG ADDL SEQ IV INF: CPT

## 2024-06-18 PROCEDURE — 25000003 PHARM REV CODE 250

## 2024-06-18 PROCEDURE — 93005 ELECTROCARDIOGRAM TRACING: CPT

## 2024-06-18 PROCEDURE — 83605 ASSAY OF LACTIC ACID: CPT

## 2024-06-18 PROCEDURE — 85025 COMPLETE CBC W/AUTO DIFF WBC: CPT

## 2024-06-18 PROCEDURE — 63600175 PHARM REV CODE 636 W HCPCS

## 2024-06-18 PROCEDURE — 99285 EMERGENCY DEPT VISIT HI MDM: CPT | Mod: 25

## 2024-06-18 RX ORDER — PREDNISONE 20 MG/1
40 TABLET ORAL DAILY
Qty: 10 TABLET | Refills: 0 | Status: SHIPPED | OUTPATIENT
Start: 2024-06-18 | End: 2024-06-23

## 2024-06-18 RX ORDER — METHYLPREDNISOLONE SOD SUCC 125 MG
125 VIAL (EA) INJECTION
Status: COMPLETED | OUTPATIENT
Start: 2024-06-18 | End: 2024-06-18

## 2024-06-18 RX ORDER — IPRATROPIUM BROMIDE AND ALBUTEROL SULFATE 2.5; .5 MG/3ML; MG/3ML
3 SOLUTION RESPIRATORY (INHALATION)
Status: COMPLETED | OUTPATIENT
Start: 2024-06-18 | End: 2024-06-18

## 2024-06-18 RX ORDER — LEVOFLOXACIN 750 MG/1
750 TABLET ORAL DAILY
Qty: 7 TABLET | Refills: 0 | Status: SHIPPED | OUTPATIENT
Start: 2024-06-18 | End: 2024-06-25

## 2024-06-18 RX ADMIN — METHYLPREDNISOLONE SODIUM SUCCINATE 125 MG: 125 INJECTION, POWDER, FOR SOLUTION INTRAMUSCULAR; INTRAVENOUS at 03:06

## 2024-06-18 RX ADMIN — SODIUM CHLORIDE 500 ML: 9 INJECTION, SOLUTION INTRAVENOUS at 03:06

## 2024-06-18 RX ADMIN — CEFTRIAXONE SODIUM 1 G: 1 INJECTION, POWDER, FOR SOLUTION INTRAMUSCULAR; INTRAVENOUS at 03:06

## 2024-06-18 RX ADMIN — AZITHROMYCIN MONOHYDRATE 500 MG: 500 INJECTION, POWDER, LYOPHILIZED, FOR SOLUTION INTRAVENOUS at 03:06

## 2024-06-18 RX ADMIN — IPRATROPIUM BROMIDE AND ALBUTEROL SULFATE 3 ML: .5; 3 SOLUTION RESPIRATORY (INHALATION) at 03:06

## 2024-06-18 NOTE — ED TRIAGE NOTES
Chief Complaint   Patient presents with    General Illness     Pt present to ed with c/o not having an appetite, dizziness, chest congestion and weakness for over 1 month

## 2024-06-18 NOTE — ED NOTES
Patients heart rate went to the 150's.  EKG done and reported to Dr Ramirez and Dr Saenz.   No new orders.

## 2024-06-18 NOTE — ED PROVIDER NOTES
"Encounter Date: 6/18/2024       History     Chief Complaint   Patient presents with    General Illness     Pt present to ed with c/o not having an appetite, dizziness, chest congestion and weakness for over 1 month     Pt is an 83 yo male who presents with cc of "not feeling well." Pt is accompanied by wife who provides most the history. States that pt has not felt well for approximately 1 month. Endorses fatigue, dizziness, and malaise. Pt does have history of COPD but states that he doesn't feel any more SOB than normal. Pt states that the weakness and dizziness are his biggest concerns. He was recently seen in ED for right shoulder pain that was radiating to his head and neck, workup was grossly negative at that time and patient was treated with toradol and muscle relaxers that resolved his symptoms. He has no other acute complaints or concerns at this time.       Review of patient's allergies indicates:   Allergen Reactions    Sulfa (sulfonamide antibiotics) Hives     Past Medical History:   Diagnosis Date    CHF (congestive heart failure) 04/08/2023    EF  35%    Chronic atrial fibrillation     Chronic hypoxic respiratory failure, on home oxygen therapy     COPD (chronic obstructive pulmonary disease)     Sarcoma      Past Surgical History:   Procedure Laterality Date    ABDOMINAL AORTIC ANEURYSM REPAIR      BACK SURGERY      FLEXOR TENDON REPAIR Right 11/4/2021    Procedure: REPAIR, TENDON, FLEXOR;  Surgeon: Dani Degroot MD;  Location: [facility];  Service: Orthopedics;  Laterality: Right;    SURGICAL REMOVAL OF SARCOMA      TRIGGER FINGER RELEASE Right 11/4/2021    Procedure: RELEASE, TRIGGER FINGER;  Surgeon: Dani Degroot MD;  Location: [facility];  Service: Orthopedics;  Laterality: Right;    VASCULAR SURGERY       Family History   Problem Relation Name Age of Onset    No Known Problems Mother      No Known Problems Father       Social History     Tobacco Use    Smoking status: " Former     Current packs/day: 1.00     Average packs/day: 1 pack/day for 30.0 years (30.0 ttl pk-yrs)     Types: Cigarettes     Passive exposure: Past    Smokeless tobacco: Current     Types: Chew   Substance Use Topics    Alcohol use: Yes    Drug use: Never     Review of Systems   All other systems reviewed and are negative.      Physical Exam     Initial Vitals [06/18/24 1300]   BP Pulse Resp Temp SpO2   (!) 98/53 103 (!) 22 97.3 °F (36.3 °C) (!) 91 %      MAP       --         Physical Exam    Nursing note and vitals reviewed.  Constitutional: He appears well-developed and well-nourished. No distress.   HENT:   Head: Normocephalic and atraumatic.   Right Ear: External ear normal.   Left Ear: External ear normal.   Nose: Nose normal.   Eyes: Conjunctivae are normal. Pupils are equal, round, and reactive to light. No scleral icterus.   Neck:   Normal range of motion.  Cardiovascular:  Normal rate and regular rhythm.     Exam reveals no gallop and no friction rub.       Murmur heard.  Pulmonary/Chest: No respiratory distress. He has wheezes. He has no rhonchi. He has rales. He exhibits no tenderness.   Abdominal: Abdomen is soft. Bowel sounds are normal. He exhibits no distension. There is no abdominal tenderness. There is no rebound and no guarding.   Musculoskeletal:         General: Tenderness present. No edema. Normal range of motion.      Cervical back: Normal range of motion.     Neurological: He is alert and oriented to person, place, and time.   Skin: Skin is warm and dry.   Psychiatric: He has a normal mood and affect. Thought content normal.         Medical Screening Exam   See Full Note    ED Course   Procedures  Labs Reviewed   COMPREHENSIVE METABOLIC PANEL - Abnormal; Notable for the following components:       Result Value    Sodium 130 (*)     Chloride 95 (*)     Glucose 117 (*)     Creatinine 1.37 (*)     Albumin 3.1 (*)     eGFR 51 (*)     All other components within normal limits   CBC WITH  DIFFERENTIAL - Abnormal; Notable for the following components:    RBC 3.85 (*)     Hemoglobin 11.6 (*)     Hematocrit 35.7 (*)     Lymphocytes % 24.6 (*)     Monocytes % 12.0 (*)     Eosinophils % 7.1 (*)     Monocytes, Absolute 0.83 (*)     All other components within normal limits   LACTIC ACID, PLASMA - Normal   NT-PRO NATRIURETIC PEPTIDE - Normal   CBC W/ AUTO DIFFERENTIAL    Narrative:     The following orders were created for panel order CBC auto differential.  Procedure                               Abnormality         Status                     ---------                               -----------         ------                     CBC with Differential[[id number]]       Abnormal            Final result                 Please view results for these tests on the individual orders.          Imaging Results              X-Ray Chest PA And Lateral (Final result)  Result time 06/18/24 14:26:55      Final result by Guillermo Elizalde DO (06/18/24 14:26:55)                   Impression:      As above      Electronically signed by: Guillermo Elizalde  Date:    06/18/2024  Time:    14:26               Narrative:    EXAMINATION:  XR CHEST PA AND LATERAL    CLINICAL HISTORY:  Cough;    TECHNIQUE:  XR CHEST PA AND LATERAL    COMPARISON:  4/20/24    FINDINGS:  No lines or tubes.    Prominence of the pulmonary vasculature and interstitial lung markings, correlate with fluid status    Normal pleura.    Cardiac silhouette is similar to comparison exam.    No obvious acute bone findings.                                       Medications   azithromycin (ZITHROMAX) 500 mg in dextrose 5 % (D5W) 250 mL IVPB ( Intravenous Restarted 6/18/24 1551)   sodium chloride 0.9% bolus 500 mL 500 mL (0 mLs Intravenous Stopped 6/18/24 1554)   albuterol-ipratropium 2.5 mg-0.5 mg/3 mL nebulizer solution 3 mL (3 mLs Nebulization Given 6/18/24 1508)   cefTRIAXone (Rocephin) 1 g in dextrose 5 % in water (D5W) 100 mL IVPB (MB+) (0 g Intravenous  Stopped 6/18/24 1550)   methylPREDNISolone sodium succinate injection 125 mg (125 mg Intravenous Given 6/18/24 1506)     Medical Decision Making  MDM    Patient presents for emergent evaluation of acute generalized fatigue, SOB, and malaise that poses a threat to life and/or bodily function.    In the ED patient found to have acute COPD exacerbation with URTI.    I ordered labs and personally reviewed them.  Labs significant for Mild hyponatremia likely 2/2 dehydration, mild renal insufficiency with Cr of 1.3, remainder grossly WNL.  I ordered X-rays and personally reviewed them and reviewed the radiologist interpretation.  Xray significant for COPD    I ordered EKG and personally reviewed it.  EKG significant for Afib.      Discharge MDM  I discussed the patient presentation and findings with the consultant for Dr Ramirez (ED)    Patient was managed in the ED with IV NaCl, Solumedrol 125mg, Azithromycin and Rocephin.    The response to treatment was Improvement of symptoms..    Patient was discharged in stable condition.  Detailed return precautions discussed.      Amount and/or Complexity of Data Reviewed  Labs: ordered.  Radiology: ordered.    Risk  Prescription drug management.                                      Clinical Impression:   Final diagnoses:  [J20.9] Acute bronchitis, unspecified organism (Primary)  [J44.1] COPD exacerbation        ED Disposition Condition    Discharge Stable          ED Prescriptions       Medication Sig Dispense Start Date End Date Auth. Provider    levoFLOXacin (LEVAQUIN) 750 MG tablet Take 1 tablet (750 mg total) by mouth once daily. for 7 days 7 tablet 6/18/2024 6/25/2024 Clyde Saenz MD    predniSONE (DELTASONE) 20 MG tablet Take 2 tablets (40 mg total) by mouth once daily. for 5 days 10 tablet 6/18/2024 6/23/2024 Clyde Saenz MD          Follow-up Information       Follow up With Specialties Details Why Contact Info    Jayant Castro MD Family Medicine, Emergency Medicine  In 1 week  [address] [facility]  [facility] [address]  [phone]

## 2024-06-19 ENCOUNTER — TELEPHONE (OUTPATIENT)
Dept: EMERGENCY MEDICINE | Facility: HOSPITAL | Age: 84
End: 2024-06-19
Payer: OTHER GOVERNMENT

## 2024-06-20 LAB
OHS QRS DURATION: 72 MS
OHS QTC CALCULATION: 406 MS

## 2024-06-25 ENCOUNTER — OFFICE VISIT (OUTPATIENT)
Dept: PAIN MEDICINE | Facility: CLINIC | Age: 84
End: 2024-06-25
Payer: OTHER GOVERNMENT

## 2024-06-25 VITALS
HEART RATE: 104 BPM | SYSTOLIC BLOOD PRESSURE: 120 MMHG | HEIGHT: 72 IN | BODY MASS INDEX: 30.2 KG/M2 | DIASTOLIC BLOOD PRESSURE: 65 MMHG | WEIGHT: 223 LBS | RESPIRATION RATE: 17 BRPM

## 2024-06-25 DIAGNOSIS — G89.29 CHRONIC RIGHT SHOULDER PAIN: ICD-10-CM

## 2024-06-25 DIAGNOSIS — M25.511 CHRONIC RIGHT SHOULDER PAIN: ICD-10-CM

## 2024-06-25 DIAGNOSIS — Z79.899 ENCOUNTER FOR LONG-TERM (CURRENT) USE OF OTHER MEDICATIONS: Primary | ICD-10-CM

## 2024-06-25 LAB

## 2024-06-25 PROCEDURE — 99204 OFFICE O/P NEW MOD 45 MIN: CPT | Mod: S$PBB,,, | Performed by: PAIN MEDICINE

## 2024-06-25 PROCEDURE — 99999PBSHW POCT URINE DRUG SCREEN PRESUMP: Mod: PBBFAC,,,

## 2024-06-25 PROCEDURE — 80305 DRUG TEST PRSMV DIR OPT OBS: CPT | Mod: PBBFAC | Performed by: PAIN MEDICINE

## 2024-06-25 PROCEDURE — G0481 DRUG TEST DEF 8-14 CLASSES: HCPCS | Mod: ,,, | Performed by: CLINICAL MEDICAL LABORATORY

## 2024-06-25 PROCEDURE — 99215 OFFICE O/P EST HI 40 MIN: CPT | Mod: PBBFAC | Performed by: PAIN MEDICINE

## 2024-06-25 PROCEDURE — 99999 PR PBB SHADOW E&M-EST. PATIENT-LVL V: CPT | Mod: PBBFAC,,, | Performed by: PAIN MEDICINE

## 2024-06-25 RX ORDER — HYDROCODONE BITARTRATE AND ACETAMINOPHEN 5; 325 MG/1; MG/1
1 TABLET ORAL EVERY 12 HOURS PRN
Qty: 30 TABLET | Refills: 0 | Status: SHIPPED | OUTPATIENT
Start: 2024-06-25 | End: 2024-07-10

## 2024-06-25 NOTE — PATIENT INSTRUCTIONS
YOU WILL BE SCHEDULED FOR RIGHT SHOULDER INJECTION WHEN OUR OFFICE RECEIVES APPROVAL TO HOLD ELIQUIS.

## 2024-06-25 NOTE — PROGRESS NOTES
Chronic Pain - New Consult    Referring Physician: Order, Paper       SUBJECTIVE: Disclaimer: This note has been generated using voice-recognition software. There may be typographical errors that have been missed during proof-reading      Initial encounter:    Nathan Hinton presents to the clinic for the evaluation of right shoulder and lower back pain.       84-year-old male presents for new patient evaluation and consultation from Dr. Dugan.  He was a previous pain clinic patient in 2020 and was lost discharge.  He has been treated with medication management at the [facility] until narcotic were discontinued by the service.  He has a long history of lower back and has been wheelchair dependent for several years.  He presents today for a six-month history of right shoulder with progressive worsening after a fall. He was evaluated in the emergency department and x-rays revealed degenerative changes.  The pain has remained constant with VAS score 7-8/10.  The pain is localized but has very limited impact on  his range of motion. He requires anticoagulants and consumes large quantities of BC powder for relief.  He has not been involved in physical therapy, received an orthopedic evaluation or shoulder injections..      Pain Assessment  Pain Assessment: 0-10  Pain Score:   5  Pain Location: Back  Pain Orientation: Lower  Pain Descriptors: Aching  Pain Frequency: Intermittent  Pain Onset: On-going  Clinical Progression: Gradually worsening  Aggravating Factors: Standing, Walking  Pain Intervention(s): Home medication, Rest      Physical Therapy/Home Exercise: no      Pain Medications:  has a current medication list which includes the following prescription(s): albuterol, amlodipine besylate, atorvastatin, budesonide, eliquis, ipratropium, magnesium oxide, multivit-min/ferrous fumarate, fish oil-omega-3 fatty acids, omeprazole, theophylline, hydrocodone-acetaminophen, levofloxacin, methylprednisolone, metoprolol  "succinate, myrbetriq, and montelukast.      Tried in Past:  NSAIDS-yes  TCA-no  SNRI-no  Anti-convulsants-no  Muscle Relaxants-no  Opioids-yes  Benzodiazepines-no     4A"s of Opioid Risk Assessment  Activity: Patient is unable to perform  ADL  Analgesia:  Patient's pain is partially controlled by current medication.   Aberrant Behavior:  reviewed with no aberrant drug seeking/taking behavior     report:  Reviewed and consistent with medication use as prescribed.    Patient denies suicidal or homicidal ideations    Pain interventional therapy-yes, 202-    Chiropractor -no  Acupuncture - no  TENS unit -no  Massage therapy-no  Spinal decompression -no  Joint replacement -no       Review of Systems   Constitutional: Negative.    HENT: Negative.     Eyes: Negative.    Respiratory: Negative.     Cardiovascular: Negative.    Gastrointestinal: Negative.    Endocrine: Negative.    Genitourinary: Negative.    Musculoskeletal:  Positive for arthralgias (Right shoulder), back pain and gait problem.   Integumentary:  Negative.   Allergic/Immunologic: Negative.    Hematological:  Bruises/bleeds easily.   Psychiatric/Behavioral:  Positive for sleep disturbance.              X-Ray Chest PA And Lateral  Narrative: EXAMINATION:  XR CHEST PA AND LATERAL    CLINICAL HISTORY:  Cough;    TECHNIQUE:  XR CHEST PA AND LATERAL    COMPARISON:  4/20/24    FINDINGS:  No lines or tubes.    Prominence of the pulmonary vasculature and interstitial lung markings, correlate with fluid status    Normal pleura.    Cardiac silhouette is similar to comparison exam.    No obvious acute bone findings.  Impression: As above    Electronically signed by: Guillermo Elizalde  Date:    06/18/2024  Time:    14:26         Past Medical History:   Diagnosis Date    Chronic atrial fibrillation     Chronic hypoxic respiratory failure, on home oxygen therapy     Chronic systolic heart failure 07/12/2023    EF  35%    COPD (chronic obstructive pulmonary disease)  "    GERD (gastroesophageal reflux disease) 11/30/2016    Formatting of this note might be different from the original.  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     Past Surgical History:   Procedure Laterality Date    ABDOMINAL AORTIC ANEURYSM REPAIR      BACK SURGERY      FLEXOR TENDON REPAIR Right 11/4/2021    Procedure: REPAIR, TENDON, FLEXOR;  Surgeon: Dani Degroot MD;  Location: [facility];  Service: Orthopedics;  Laterality: Right;    SURGICAL REMOVAL OF SARCOMA      TRIGGER FINGER RELEASE Right 11/4/2021    Procedure: RELEASE, TRIGGER FINGER;  Surgeon: Dani Degroot MD;  Location: [facility] OR;  Service: Orthopedics;  Laterality: Right;    VASCULAR SURGERY       Social History     Socioeconomic History    Marital status:    Tobacco Use    Smoking status: Former     Current packs/day: 1.00     Average packs/day: 1 pack/day for 30.0 years (30.0 ttl pk-yrs)     Types: Cigarettes     Passive exposure: Past    Smokeless tobacco: Current     Types: Chew   Substance and Sexual Activity    Alcohol use: Yes    Drug use: Never    Sexual activity: Not Currently     Social Determinants of Health     Financial Resource Strain: Low Risk  (4/10/2023)    Overall Financial Resource Strain (CARDIA)     Difficulty of Paying Living Expenses: Not hard at all   Food Insecurity: No Food Insecurity (4/10/2023)    Hunger Vital Sign     Worried About Running Out of Food in the Last Year: Never true     Ran Out of Food in the Last Year: Never true   Transportation Needs: No Transportation Needs (4/10/2023)    PRAPARE - Transportation     Lack of Transportation (Medical): No     Lack of Transportation (Non-Medical): No   Physical Activity: Inactive (4/10/2023)    Exercise Vital Sign     Days of Exercise per Week: 0 days     Minutes of Exercise per Session: 0 min   Stress: No Stress Concern Present (4/10/2023)     [facility] [employer] - Occupational Stress Questionnaire     Feeling of Stress : Not at all   Housing Stability: Low Risk  (4/10/2023)    Housing Stability Vital Sign     Unable to Pay for Housing in the Last Year: No     Number of Places Lived in the Last Year: 1     Unstable Housing in the Last Year: No     Family History   Problem Relation Name Age of Onset    No Known Problems Mother      No Known Problems Father       Review of patient's allergies indicates:   Allergen Reactions    Sulfa (sulfonamide antibiotics) Hives         OBJECTIVE:  Vitals:    06/25/24 1459   BP: 120/65   Pulse: 104   Resp: 17     /65   Pulse 104   Resp 17   Ht 6' (1.829 m)   Wt 101.2 kg (223 lb)   BMI 30.24 kg/m²   Physical Exam  Vitals and nursing note reviewed. Chaperone present: Accompanied by his daughter.   Constitutional:       General: He is not in acute distress.     Appearance: Normal appearance. He is not ill-appearing, toxic-appearing or diaphoretic.      Interventions: Nasal cannula in place.   HENT:      Head: Normocephalic and atraumatic.      Nose: Nose normal.      Mouth/Throat:      Mouth: Mucous membranes are moist.   Eyes:      Extraocular Movements: Extraocular movements intact.      Pupils: Pupils are equal, round, and reactive to light.   Cardiovascular:      Rate and Rhythm: Normal rate and regular rhythm.      Heart sounds: Normal heart sounds.   Pulmonary:      Effort: Pulmonary effort is normal. No respiratory distress.      Breath sounds: Normal breath sounds. No stridor. No wheezing or rhonchi.   Abdominal:      General: Bowel sounds are normal.      Palpations: Abdomen is soft.   Musculoskeletal:         General: No swelling or deformity.      Right shoulder: Tenderness and bony tenderness present. Decreased range of motion.      Cervical back: Normal and normal range of motion. No spasms or tenderness. No pain with movement. Normal range of motion.      Thoracic back: Normal.       Lumbar back: Spasms and tenderness present. No bony tenderness. Decreased range of motion. Negative right straight leg raise test and negative left straight leg raise test. No scoliosis.      Right lower leg: No edema.      Left lower leg: No edema.   Skin:     General: Skin is warm.   Neurological:      General: No focal deficit present.      Mental Status: He is alert and oriented to person, place, and time. Mental status is at baseline.      Cranial Nerves: No cranial nerve deficit.      Sensory: Sensation is intact. No sensory deficit.      Motor: No weakness.      Coordination: Coordination normal.      Gait: Gait abnormal (Uses a wheelchair).      Deep Tendon Reflexes: Reflexes are normal and symmetric.   Psychiatric:         Mood and Affect: Mood normal.         Behavior: Behavior normal.            ASSESSMENT: 84 y.o. year old male with pain, consistent with     Encounter Diagnoses   Name Primary?    Encounter for long-term (current) use of other medications Yes    Chronic right shoulder pain         PLAN:   1. reviewed  2..Addiction, Dependency, Tolerance, Opioid abuse-misuse, Death, Diversion Discussed. 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  Patient is receiving opiates which require greater than a 72 hour supply of therapy.  Patient was educated on potential dependency associated with long-term opioid use as well as decreasing efficacy with prolonged use.  Patient was advised of risks, benefits and side effects and how to utilize each medication.  Patient was also informed that any deviation from therapy protocol will  lead to discontinuation of opiates.  It is reasonable to prescribe opioid analgesics for patient based on positive response to opioid medications, lack of side effects and  limited aberrant behavior.    3. Opioid contract signed today  4.Refill/ Continue medications for pain control and function.  Start Norco  for pain       Requested Prescriptions     Signed Prescriptions Disp Refills    HYDROcodone-acetaminophen (NORCO) 5-325 mg per tablet 30 tablet 0     Sig: Take 1 tablet by mouth every 12 (twelve) hours as needed for Pain.     5. Urine drug screen and confirmation testing was ordered as documented on the requisition form in order to monitor for compliance with prescribed opiates and to ensure that there was no misuse/abuse of other non-prescribed opiates or illicit drugs.  I will determine if the patient is suitable for continuation of opioid therapy after obtaining  the definitive urine drug screen for confirmation.  6. Obtain authorization for right intra-articular shoulder injection in office setting  Orders Placed This Encounter   Procedures    Drug Screen Definitive 14, Urine     Standing Status:   Future     Number of Occurrences:   1     Standing Expiration Date:   8/24/2025     Order Specific Question:   Specimen Source     Answer:   Urine    Ambulatory referral/consult to Pain Clinic     Standing Status:   Future     Standing Expiration Date:   7/25/2025     Referral Priority:   Routine     Referral Type:   Procedure     Referral Reason:   Specialty Services Required     Referred to Provider:   Citlaly Asencio MD     Requested Specialty:   Pain Medicine     Number of Visits Requested:   1    POCT Urine Drug Screen Presump     Interpretive Information:     Negative:  No drug detected at the cut off level.   Positive:  This result represents presumptive positive for the   tested drug, other substances may yield a positive response other   than the analyte of interest. This result should be utilized for   diagnostic purpose only. Confirmation testing will be performed upon physician request only.           The total time spent for evaluation and management on 06/25/2024 including reviewing separately obtained history, performing a medically appropriate exam and evaluation, documenting clinical information in  the health record, independently interpreting results and communicating them to the patient/family/caregiver, and ordering medications/tests/procedures was between 15-29 minutes.    The above plan and management options were discussed at length with patient. Patient is in agreement with the above and verbalized understanding. It will be communicated with the referring physician via electronic record, fax, or mail.    Citlaly Asencio  06/25/2024

## 2024-06-25 NOTE — LETTER
June 25, 2024        NATHAN Hinton  [address] [place] [address]             [facility]  [address] [place] [address] [place] [address]  Phone: [phone]   Patient: Nathan Hinton   MR Number: [id number]   YOB: 1940   Date of Visit: 6/25/2024       Dear Dr. Hinton:    Thank you for referring Nathan Hinton to me for evaluation. Attached you will find relevant portions of my assessment and plan of care.    If you have questions, please do not hesitate to call me. I look forward to following Nathan Hinton along with you.    Sincerely,      Citlaly Asencio MD            CC  No Recipients    Enclosure

## 2024-06-27 LAB
6-ACETYLMORPHINE, URINE (RUSH): NEGATIVE 10 NG/ML
7-AMINOCLONAZEPAM, URINE (RUSH): NEGATIVE 25 NG/ML
A-HYDROXYALPRAZOLAM, URINE (RUSH): NEGATIVE 25 NG/ML
AMPHET UR QL SCN: NEGATIVE
BENZOYLECGONINE, URINE (RUSH): NEGATIVE 100 NG/ML
BUPRENORPHINE UR QL SCN: NEGATIVE 25 NG/ML
CODEINE, URINE (RUSH): NEGATIVE 25 NG/ML
CREAT UR-MCNC: 185 MG/DL (ref 39–259)
EDDP, URINE (RUSH): NEGATIVE 25 NG/ML
FENTANYL, URINE (RUSH): NEGATIVE 2.5 NG/ML
HYDROCODONE, URINE (RUSH): NEGATIVE 25 NG/ML
HYDROMORPHONE, URINE (RUSH): NEGATIVE 25 NG/ML
METHADONE UR QL SCN: NEGATIVE 25 NG/ML
METHAMPHET UR QL SCN: NEGATIVE
MORPHINE, URINE (RUSH): NEGATIVE 25 NG/ML
NORBUPRENORPHINE, URINE (RUSH): NEGATIVE 25 NG/ML
NORDIAZEPAM, URINE (RUSH): NEGATIVE 25 NG/ML
NORFENTANYL OXALATE, URINE (RUSH): NEGATIVE 5 NG/ML
NORHYDROCODONE, URINE (RUSH): NEGATIVE 50 NG/ML
NOROXYCODONE HCL, URINE (RUSH): NEGATIVE 50 NG/ML
OXYCODONE UR QL SCN: NEGATIVE 25 NG/ML
OXYMORPHONE, URINE (RUSH): NEGATIVE 25 NG/ML
PH UR STRIP: 6 PH UNITS
SP GR UR STRIP: 1.03
TAPENTADOL, URINE (RUSH): NEGATIVE 25 NG/ML
TEMAZEPAM, URINE (RUSH): NEGATIVE 25 NG/ML
THC-COOH, URINE (RUSH): NEGATIVE 25 NG/ML
TRAMADOL, URINE (RUSH): NEGATIVE 100 NG/ML

## 2024-06-28 DIAGNOSIS — M25.511 CHRONIC RIGHT SHOULDER PAIN: Primary | ICD-10-CM

## 2024-06-28 DIAGNOSIS — G89.29 CHRONIC RIGHT SHOULDER PAIN: Primary | ICD-10-CM

## 2024-07-29 ENCOUNTER — OFFICE VISIT (OUTPATIENT)
Dept: FAMILY MEDICINE | Facility: CLINIC | Age: 84
End: 2024-07-29
Payer: MEDICARE

## 2024-07-29 VITALS
RESPIRATION RATE: 20 BRPM | HEART RATE: 94 BPM | WEIGHT: 225 LBS | SYSTOLIC BLOOD PRESSURE: 114 MMHG | HEIGHT: 72 IN | DIASTOLIC BLOOD PRESSURE: 61 MMHG | OXYGEN SATURATION: 94 % | BODY MASS INDEX: 30.48 KG/M2

## 2024-07-29 DIAGNOSIS — C49.12 SARCOMA OF LEFT UPPER EXTREMITY: ICD-10-CM

## 2024-07-29 DIAGNOSIS — M54.12 CERVICAL RADICULOPATHY: Primary | ICD-10-CM

## 2024-07-29 DIAGNOSIS — I50.22 CHRONIC SYSTOLIC (CONGESTIVE) HEART FAILURE: ICD-10-CM

## 2024-07-29 DIAGNOSIS — I73.9 PERIPHERAL VASCULAR DISEASE OF EXTREMITY: ICD-10-CM

## 2024-07-29 PROCEDURE — 3078F DIAST BP <80 MM HG: CPT | Mod: ,,, | Performed by: FAMILY MEDICINE

## 2024-07-29 PROCEDURE — 1159F MED LIST DOCD IN RCRD: CPT | Mod: ,,, | Performed by: FAMILY MEDICINE

## 2024-07-29 PROCEDURE — 1101F PT FALLS ASSESS-DOCD LE1/YR: CPT | Mod: ,,, | Performed by: FAMILY MEDICINE

## 2024-07-29 PROCEDURE — 99213 OFFICE O/P EST LOW 20 MIN: CPT | Mod: ,,, | Performed by: FAMILY MEDICINE

## 2024-07-29 PROCEDURE — 3074F SYST BP LT 130 MM HG: CPT | Mod: ,,, | Performed by: FAMILY MEDICINE

## 2024-07-29 PROCEDURE — 1126F AMNT PAIN NOTED NONE PRSNT: CPT | Mod: ,,, | Performed by: FAMILY MEDICINE

## 2024-07-29 PROCEDURE — 3288F FALL RISK ASSESSMENT DOCD: CPT | Mod: ,,, | Performed by: FAMILY MEDICINE

## 2024-07-29 NOTE — PROGRESS NOTES
Jayant Castro MD        PATIENT NAME: Nathan Hinton  : 1940  DATE: 24  MRN: [id number]      Billing Provider: Jayant Castro MD  Level of Service: LA OFFICE/OUTPT VISIT, EST, LEVL III, 20-29 MIN  Patient PCP Information       Provider PCP Type    Jayant Castro MD General            Reason for Visit / Chief Complaint: Numbness (Numbness and pain in Right hand)       Update PCP  Update Chief Complaint         History of Present Illness / Problem Focused Workflow     aNthan Hinton presents to the clinic with Numbness (Numbness and pain in Right hand)     R cervical radiculopathy with injection planned .  [place] [facility] not helping-needs 7.5 q 4 hrs.  Signed pain contract and can't get in touch with them        Review of Systems     Review of Systems   Constitutional:  Negative for activity change, appetite change, fever and unexpected weight change.   HENT:  Negative for congestion, rhinorrhea, sinus pressure, sinus pain, sore throat and trouble swallowing.    Eyes:  Negative for photophobia, pain, discharge and visual disturbance.   Respiratory:  Negative for cough, chest tightness, wheezing and stridor.    Cardiovascular:  Negative for chest pain, palpitations and leg swelling.   Gastrointestinal:  Negative for abdominal pain, blood in stool, constipation, diarrhea and nausea.   Endocrine: Negative for polydipsia, polyphagia and polyuria.   Genitourinary:  Negative for difficulty urinating, flank pain and hematuria.   Musculoskeletal:  Positive for arthralgias and neck pain.   Skin:  Negative for rash.   Allergic/Immunologic: Negative for food allergies.   Neurological:  Negative for dizziness, tremors, seizures, syncope, weakness (global weakness) and headaches.   Psychiatric/Behavioral:  Negative for behavioral problems, confusion, decreased concentration, dysphoric mood and hallucinations. The patient is not nervous/anxious.         Medical / Social / Family History     Past Medical History:    Diagnosis Date    Chronic atrial fibrillation     Chronic hypoxic respiratory failure, on home oxygen therapy     Chronic systolic heart failure 07/12/2023    EF  35%    COPD (chronic obstructive pulmonary disease)     GERD (gastroesophageal reflux disease) 11/30/2016    Formatting of this note might be different from the original.  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       Past Surgical History:   Procedure Laterality Date    ABDOMINAL AORTIC ANEURYSM REPAIR      BACK SURGERY      FLEXOR TENDON REPAIR Right 11/4/2021    Procedure: REPAIR, TENDON, FLEXOR;  Surgeon: Dani Degroot MD;  Location: [facility];  Service: Orthopedics;  Laterality: Right;    SURGICAL REMOVAL OF SARCOMA      TRIGGER FINGER RELEASE Right 11/4/2021    Procedure: RELEASE, TRIGGER FINGER;  Surgeon: Dani Degroot MD;  Location: [facility];  Service: Orthopedics;  Laterality: Right;    VASCULAR SURGERY         Social History    reports that he has quit smoking. His smoking use included cigarettes. He has a 30 pack-year smoking history. He has been exposed to tobacco smoke. His smokeless tobacco use includes chew. He reports current alcohol use. He reports that he does not use drugs.    Family History  's family history includes No Known Problems in his father and mother.    Medications and Allergies     Medications  No outpatient medications have been marked as taking for the 7/29/24 encounter (Office Visit) with Jayant Castro MD.       Allergies  Review of patient's allergies indicates:   Allergen Reactions    Sulfa (sulfonamide antibiotics) Hives       Physical Examination     Vitals:    07/29/24 1028   BP: 114/61   Pulse: 94   Resp: 20     Physical Exam  Constitutional:       General: He is not in acute distress.     Appearance: Normal appearance.   HENT:      Head: Normocephalic.      Right Ear: Tympanic  membrane and ear canal normal.      Left Ear: Tympanic membrane and ear canal normal.      Nose: Nose normal.      Mouth/Throat:      Mouth: Mucous membranes are moist.      Pharynx: No oropharyngeal exudate.   Eyes:      Extraocular Movements: Extraocular movements intact.      Pupils: Pupils are equal, round, and reactive to light.   Cardiovascular:      Rate and Rhythm: Normal rate and regular rhythm.      Heart sounds: No murmur heard.  Pulmonary:      Effort: Pulmonary effort is normal.      Breath sounds: Normal breath sounds. No wheezing.   Abdominal:      General: Abdomen is flat. Bowel sounds are normal.      Palpations: Abdomen is soft.      Hernia: No hernia is present.   Musculoskeletal:         General: Swelling and tenderness present. Normal range of motion.      Cervical back: Normal range of motion and neck supple.      Right lower leg: No edema.      Left lower leg: No edema.   Lymphadenopathy:      Cervical: No cervical adenopathy.   Skin:     General: Skin is warm and dry.      Coloration: Skin is not jaundiced.      Findings: No lesion.   Neurological:      General: No focal deficit present.      Mental Status: He is alert and oriented to person, place, and time.      Cranial Nerves: No cranial nerve deficit.      Gait: Gait normal.   Psychiatric:         Mood and Affect: Mood normal.         Behavior: Behavior normal.         Judgment: Judgment normal.          Assessment and Plan (including Health Maintenance)      Problem List  Smart Sets  Document Outside HM   :    Plan:     1. Sarcoma of left upper extremity      2. Chronic systolic (congestive) heart failure  The current medical regimen is effective;  continue present plan and medications.    3. Peripheral vascular disease of extremity  The current medical regimen is effective;  continue present plan and medications.          Health Maintenance Due   Topic Date Due    Pneumococcal Vaccines (Age 65+) (1 of 2 - PCV) Never done    TETANUS  VACCINE  Never done    Shingles Vaccine (1 of 2) Never done    RSV Vaccine (Age 60+ and Pregnant patients) (1 - 1-dose 60+ series) Never done    COVID-19 Vaccine (3 - 2023-24 season) 09/01/2023       1. Cervical radiculopathy    2. Sarcoma of left upper extremity    3. Chronic systolic (congestive) heart failure    4. Peripheral vascular disease of extremity         Health Maintenance Topics with due status: Not Due       Topic Last Completion Date    Lipid Panel 08/03/2020    Influenza Vaccine 02/28/2024       Future Appointments   Date Time Provider Department Center   8/12/2024 11:00 AM Citlaly Asencio MD [facility]        There are no Patient Instructions on file for this visit.  Follow up if symptoms worsen or fail to improve.     Signature:  Jayant Castro MD      Date of encounter: 7/29/24

## 2024-07-31 ENCOUNTER — TELEPHONE (OUTPATIENT)
Dept: FAMILY MEDICINE | Facility: CLINIC | Age: 84
End: 2024-07-31
Payer: OTHER GOVERNMENT

## 2024-08-01 ENCOUNTER — OFFICE VISIT (OUTPATIENT)
Dept: PAIN MEDICINE | Facility: CLINIC | Age: 84
End: 2024-08-01
Payer: MEDICARE

## 2024-08-01 VITALS
RESPIRATION RATE: 18 BRPM | HEART RATE: 81 BPM | SYSTOLIC BLOOD PRESSURE: 123 MMHG | HEIGHT: 72 IN | WEIGHT: 220 LBS | BODY MASS INDEX: 29.8 KG/M2 | DIASTOLIC BLOOD PRESSURE: 72 MMHG

## 2024-08-01 DIAGNOSIS — M25.511 CHRONIC RIGHT SHOULDER PAIN: Primary | Chronic | ICD-10-CM

## 2024-08-01 DIAGNOSIS — M54.12 CERVICAL RADICULOPATHY: Chronic | ICD-10-CM

## 2024-08-01 DIAGNOSIS — G89.29 CHRONIC RIGHT SHOULDER PAIN: Primary | Chronic | ICD-10-CM

## 2024-08-01 PROCEDURE — 99999 PR PBB SHADOW E&M-EST. PATIENT-LVL V: CPT | Mod: PBBFAC,,, | Performed by: PHYSICIAN ASSISTANT

## 2024-08-01 PROCEDURE — 3078F DIAST BP <80 MM HG: CPT | Mod: CPTII,,, | Performed by: PHYSICIAN ASSISTANT

## 2024-08-01 PROCEDURE — 1101F PT FALLS ASSESS-DOCD LE1/YR: CPT | Mod: CPTII,,, | Performed by: PHYSICIAN ASSISTANT

## 2024-08-01 PROCEDURE — 1159F MED LIST DOCD IN RCRD: CPT | Mod: CPTII,,, | Performed by: PHYSICIAN ASSISTANT

## 2024-08-01 PROCEDURE — 1125F AMNT PAIN NOTED PAIN PRSNT: CPT | Mod: CPTII,,, | Performed by: PHYSICIAN ASSISTANT

## 2024-08-01 PROCEDURE — 3074F SYST BP LT 130 MM HG: CPT | Mod: CPTII,,, | Performed by: PHYSICIAN ASSISTANT

## 2024-08-01 PROCEDURE — 99215 OFFICE O/P EST HI 40 MIN: CPT | Mod: PBBFAC | Performed by: PHYSICIAN ASSISTANT

## 2024-08-01 PROCEDURE — 3288F FALL RISK ASSESSMENT DOCD: CPT | Mod: CPTII,,, | Performed by: PHYSICIAN ASSISTANT

## 2024-08-01 PROCEDURE — 99214 OFFICE O/P EST MOD 30 MIN: CPT | Mod: S$PBB,,, | Performed by: PHYSICIAN ASSISTANT

## 2024-08-01 RX ORDER — NALOXONE HYDROCHLORIDE 4 MG/.1ML
1 SPRAY NASAL ONCE
Qty: 1 EACH | Refills: 0 | Status: SHIPPED | OUTPATIENT
Start: 2024-08-01 | End: 2024-08-02

## 2024-08-01 RX ORDER — HYDROCODONE BITARTRATE AND ACETAMINOPHEN 7.5; 325 MG/1; MG/1
1 TABLET ORAL EVERY 12 HOURS PRN
Qty: 60 TABLET | Refills: 0 | Status: SHIPPED | OUTPATIENT
Start: 2024-08-01

## 2024-08-01 RX ORDER — HYDROCODONE BITARTRATE AND ACETAMINOPHEN 5; 325 MG/1; MG/1
1 TABLET ORAL EVERY 12 HOURS PRN
Qty: 30 TABLET | Refills: 0 | Status: CANCELLED | OUTPATIENT
Start: 2024-08-01 | End: 2024-08-16

## 2024-08-01 NOTE — PROGRESS NOTES
Subjective:         Patient ID: Nathan Hinton is a 84 y.o. male.    Chief Complaint: Shoulder Pain (Right shoulder pain,  right hand pain)      Pain  This is a chronic problem. The current episode started more than 1 year ago. The problem occurs daily. The problem has been waxing and waning. Associated symptoms include arthralgias. Pertinent negatives include no anorexia, change in bowel habit, chest pain, chills, coughing, diaphoresis, fever, neck pain, rash, sore throat, swollen glands, urinary symptoms, vertigo or vomiting.     Review of Systems   Constitutional:  Negative for activity change, appetite change, chills, diaphoresis, fever and unexpected weight change.   HENT:  Negative for drooling, ear discharge, ear pain, facial swelling, nosebleeds, sore throat, trouble swallowing, voice change and goiter.    Eyes:  Negative for photophobia, pain, discharge, redness and visual disturbance.   Respiratory:  Negative for apnea, cough, choking, chest tightness, shortness of breath, wheezing and stridor.    Cardiovascular:  Negative for chest pain, palpitations and leg swelling.   Gastrointestinal:  Negative for abdominal distention, anorexia, change in bowel habit, diarrhea, rectal pain, vomiting and fecal incontinence.   Endocrine: Negative for cold intolerance, heat intolerance, polydipsia, polyphagia and polyuria.   Genitourinary:  Negative for bladder incontinence, dysuria, flank pain and frequency.   Musculoskeletal:  Positive for arthralgias, back pain, gait problem and leg pain. Negative for neck pain.   Integumentary:  Negative for color change, pallor and rash.   Neurological:  Negative for dizziness, vertigo, seizures, syncope, facial asymmetry, speech difficulty, light-headedness, memory loss and coordination difficulties.   Hematological:  Negative for adenopathy. Does not bruise/bleed easily.   Psychiatric/Behavioral:  Negative for agitation, behavioral problems, confusion, decreased concentration,  dysphoric mood, hallucinations, self-injury and suicidal ideas. The patient is not nervous/anxious and is not hyperactive.            Past Medical History:   Diagnosis Date    Chronic atrial fibrillation     Chronic hypoxic respiratory failure, on home oxygen therapy     Chronic systolic heart failure 07/12/2023    EF  35%    COPD (chronic obstructive pulmonary disease)     GERD (gastroesophageal reflux disease) 11/30/2016    Formatting of this note might be different from the original.  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     Past Surgical History:   Procedure Laterality Date    ABDOMINAL AORTIC ANEURYSM REPAIR      BACK SURGERY      FLEXOR TENDON REPAIR Right 11/4/2021    Procedure: REPAIR, TENDON, FLEXOR;  Surgeon: Dani Degroot MD;  Location: [facility];  Service: Orthopedics;  Laterality: Right;    SURGICAL REMOVAL OF SARCOMA      TRIGGER FINGER RELEASE Right 11/4/2021    Procedure: RELEASE, TRIGGER FINGER;  Surgeon: Dani Degroot MD;  Location: [facility];  Service: Orthopedics;  Laterality: Right;    VASCULAR SURGERY       Social History     Socioeconomic History    Marital status:    Tobacco Use    Smoking status: Former     Current packs/day: 1.00     Average packs/day: 1 pack/day for 30.0 years (30.0 ttl pk-yrs)     Types: Cigarettes     Passive exposure: Past    Smokeless tobacco: Current     Types: Chew   Substance and Sexual Activity    Alcohol use: Yes    Drug use: Never    Sexual activity: Not Currently     Social Determinants of Health     Financial Resource Strain: Low Risk  (4/10/2023)    Overall Financial Resource Strain (CARDIA)     Difficulty of Paying Living Expenses: Not hard at all   Food Insecurity: No Food Insecurity (4/10/2023)    Hunger Vital Sign     Worried About Running Out of Food in the Last Year: Never true     Ran Out of Food in the Last Year: Never  true   Transportation Needs: No Transportation Needs (4/10/2023)    PRAPARE - Transportation     Lack of Transportation (Medical): No     Lack of Transportation (Non-Medical): No   Physical Activity: Inactive (4/10/2023)    Exercise Vital Sign     Days of Exercise per Week: 0 days     Minutes of Exercise per Session: 0 min   Stress: No Stress Concern Present (4/10/2023)    [employer] - Occupational Stress Questionnaire     Feeling of Stress : Not at all   Housing Stability: Low Risk  (4/10/2023)    Housing Stability Vital Sign     Unable to Pay for Housing in the Last Year: No     Number of Places Lived in the Last Year: 1     Unstable Housing in the Last Year: No     Family History   Problem Relation Name Age of Onset    No Known Problems Mother      No Known Problems Father       Review of patient's allergies indicates:   Allergen Reactions    Sulfa (sulfonamide antibiotics) Hives        Objective:  Vitals:    08/01/24 1132 08/01/24 1134   BP: 123/72    Pulse: 81    Resp: 18    Weight: 99.8 kg (220 lb)    Height: 6' (1.829 m)    PainSc:   6   6   PainLoc: Hand          Physical Exam  Vitals and nursing note reviewed. Exam conducted with a chaperone present.   Constitutional:       General: He is awake. He is not in acute distress.     Appearance: Normal appearance. He is not ill-appearing, toxic-appearing or diaphoretic.   HENT:      Head: Normocephalic and atraumatic.      Nose: Nose normal.      Mouth/Throat:      Mouth: Mucous membranes are moist.      Pharynx: Oropharynx is clear.   Eyes:      Conjunctiva/sclera: Conjunctivae normal.      Pupils: Pupils are equal, round, and reactive to light.   Cardiovascular:      Rate and Rhythm: Normal rate.   Pulmonary:      Effort: Pulmonary effort is normal. No respiratory distress.   Abdominal:      Palpations: Abdomen is soft.      Tenderness: There is no guarding.   Musculoskeletal:         General: Normal range of motion.      Cervical  back: Normal range of motion and neck supple. No rigidity.   Skin:     General: Skin is warm and dry.      Coloration: Skin is not jaundiced or pale.   Neurological:      General: No focal deficit present.      Mental Status: He is alert and oriented to person, place, and time. Mental status is at baseline.      Cranial Nerves: No cranial nerve deficit (II-XII).      Gait: Gait abnormal.   Psychiatric:         Mood and Affect: Mood normal.         Behavior: Behavior normal. Behavior is cooperative.         Thought Content: Thought content normal.           X-Ray Chest PA And Lateral  Narrative: EXAMINATION:  XR CHEST PA AND LATERAL    CLINICAL HISTORY:  Cough;    TECHNIQUE:  XR CHEST PA AND LATERAL    COMPARISON:  4/20/24    FINDINGS:  No lines or tubes.    Prominence of the pulmonary vasculature and interstitial lung markings, correlate with fluid status    Normal pleura.    Cardiac silhouette is similar to comparison exam.    No obvious acute bone findings.  Impression: As above    Electronically signed by: Guillermo Elizalde  Date:    06/18/2024  Time:    14:26       Office Visit on 06/25/2024   Component Date Value Ref Range Status    POC Amphetamines 06/25/2024 Negative  Negative, Inconclusive Final    POC Barbiturates 06/25/2024 Negative  Negative, Inconclusive Final    POC Benzodiazepines 06/25/2024 Negative  Negative, Inconclusive Final    POC Cocaine 06/25/2024 Negative  Negative, Inconclusive Final    POC THC 06/25/2024 Negative  Negative, Inconclusive Final    POC Methadone 06/25/2024 Negative  Negative, Inconclusive Final    POC Methamphetamine 06/25/2024 Negative  Negative, Inconclusive Final    POC Opiates 06/25/2024 Negative  Negative, Inconclusive Final    POC Oxycodone 06/25/2024 Negative  Negative, Inconclusive Final    POC Phencyclidine 06/25/2024 Negative  Negative, Inconclusive Final    POC Methylenedioxymethamphetamine * 06/25/2024 Negative  Negative, Inconclusive Final    POC Tricyclic  Antidepressants 06/25/2024 Negative  Negative, Inconclusive Final    POC Buprenorphine 06/25/2024 Negative   Final     Acceptable 06/25/2024 Yes   Final    POC Temperature (Urine) 06/25/2024 92   Final    pH, UA 06/25/2024 6.0  5.0 to 8.0 pH Units Final    Creatinine, Urine 06/25/2024 185  39 - 259 mg/dL Final    6-Acetylmorphine 06/25/2024 Negative  10 ng/mL Final    7-Aminoclonazepam 06/25/2024 Negative  Negative 25 ng/mL Final    a-Hydroxyalprazolam 06/25/2024 Negative  Negative 25 ng/mL Final    Benzoylecgonine 06/25/2024 Negative  100 ng/mL Final    Buprenorphine 06/25/2024 Negative  25 ng/mL Final    Codeine 06/25/2024 Negative  25 ng/mL Final    EDDP 06/25/2024 Negative  25 ng/mL Final    Fentanyl 06/25/2024 Negative  2.5 ng/mL Final    Hydrocodone 06/25/2024 Negative  25 ng/mL Final    Hydromorphone 06/25/2024 Negative  25 ng/mL Final    Morphine 06/25/2024 Negative  25 ng/mL Final    Norbuprenorphine 06/25/2024 Negative  25 ng/mL Final    Nordiazepam 06/25/2024 Negative  25 ng/mL Final    Norfentanyl Oxalate 06/25/2024 Negative  5 ng/mL Final    Norhydrocodone 06/25/2024 Negative  50 ng/mL Final    Noroxycodone HCL 06/25/2024 Negative  50 ng/mL Final    Oxymorphone 06/25/2024 Negative  25 ng/mL Final    Tapentadol 06/25/2024 Negative  25 ng/mL Final    Temazepam 06/25/2024 Negative  25 ng/mL Final    THC-COOH 06/25/2024 Negative  25 ng/mL Final    Tramadol 06/25/2024 Negative  100 ng/mL Final    Amphetamine, Urine 06/25/2024 Negative  Negative Final    Methamphetamines, Urine 06/25/2024 Negative  Negative Final    Methadone, Urine 06/25/2024 Negative  Negative 25 ng/mL Final    Oxycodone, Urine 06/25/2024 Negative  Negative 25 ng/mL Final    Specific Gravity, UA 06/25/2024 1.026  <=1.030 Final   Admission on 06/18/2024, Discharged on 06/18/2024   Component Date Value Ref Range Status    Sodium 06/18/2024 130 (L)  136 - 145 mmol/L Final    Potassium 06/18/2024 4.3  3.5 - 5.1 mmol/L Final     Chloride 06/18/2024 95 (L)  98 - 107 mmol/L Final    CO2 06/18/2024 28  21 - 32 mmol/L Final    Anion Gap 06/18/2024 11  7 - 16 mmol/L Final    Glucose 06/18/2024 117 (H)  74 - 106 mg/dL Final    BUN 06/18/2024 18  7 - 18 mg/dL Final    Creatinine 06/18/2024 1.37 (H)  0.70 - 1.30 mg/dL Final    BUN/Creatinine Ratio 06/18/2024 13  6 - 20 Final    Calcium 06/18/2024 9.1  8.5 - 10.1 mg/dL Final    Total Protein 06/18/2024 7.0  6.4 - 8.2 g/dL Final    Albumin 06/18/2024 3.1 (L)  3.5 - 5.0 g/dL Final    Globulin 06/18/2024 3.9  2.0 - 4.0 g/dL Final    A/G Ratio 06/18/2024 0.8   Final    Bilirubin, Total 06/18/2024 0.6  >0.0 - 1.2 mg/dL Final    Alk Phos 06/18/2024 87  45 - 115 U/L Final    ALT 06/18/2024 19  16 - 61 U/L Final    AST 06/18/2024 23  15 - 37 U/L Final    eGFR 06/18/2024 51 (L)  >=60 mL/min/1.73m2 Final    Lactic Acid 06/18/2024 1.2  0.4 - 2.0 mmol/L Final    QRS Duration 06/18/2024 72  ms Final    OHS QTC Calculation 06/18/2024 406  ms Final    ProBNP 06/18/2024 262  1 - 450 pg/mL Final    WBC 06/18/2024 6.94  4.50 - 11.00 K/uL Final    RBC 06/18/2024 3.85 (L)  4.60 - 6.20 M/uL Final    Hemoglobin 06/18/2024 11.6 (L)  13.5 - 18.0 g/dL Final    Hematocrit 06/18/2024 35.7 (L)  40.0 - 54.0 % Final    MCV 06/18/2024 92.7  80.0 - 96.0 fL Final    MCH 06/18/2024 30.1  27.0 - 31.0 pg Final    MCHC 06/18/2024 32.5  32.0 - 36.0 g/dL Final    RDW 06/18/2024 12.7  11.5 - 14.5 % Final    Platelet Count 06/18/2024 220  150 - 400 K/uL Final    MPV 06/18/2024 10.2  9.4 - 12.4 fL Final    Neutrophils % 06/18/2024 55.7  53.0 - 65.0 % Final    Lymphocytes % 06/18/2024 24.6 (L)  27.0 - 41.0 % Final    Monocytes % 06/18/2024 12.0 (H)  2.0 - 6.0 % Final    Eosinophils % 06/18/2024 7.1 (H)  1.0 - 4.0 % Final    Basophils % 06/18/2024 0.6  0.0 - 1.0 % Final    Immature Granulocytes % 06/18/2024 0.0  0.0 - 0.4 % Final    nRBC, Auto 06/18/2024 0.0  <=0.0 % Final    Neutrophils, Abs 06/18/2024 3.87  1.80 - 7.70 K/uL Final     Lymphocytes, Absolute 06/18/2024 1.71  1.00 - 4.80 K/uL Final    Monocytes, Absolute 06/18/2024 0.83 (H)  0.00 - 0.80 K/uL Final    Eosinophils, Absolute 06/18/2024 0.49  0.00 - 0.50 K/uL Final    Basophils, Absolute 06/18/2024 0.04  0.00 - 0.20 K/uL Final    Immature Granulocytes, Absolute 06/18/2024 0.00  0.00 - 0.04 K/uL Final    nRBC, Absolute 06/18/2024 0.00  <=0.00 x10e3/uL Final    Diff Type 06/18/2024 Auto   Final   Admission on 05/24/2024, Discharged on 05/24/2024   Component Date Value Ref Range Status    ESR Westergren 05/24/2024 48 (H)  0 - 30 mm/Hr Final    Sodium 05/24/2024 132 (L)  136 - 145 mmol/L Final    Potassium 05/24/2024 4.6  3.5 - 5.1 mmol/L Final    Chloride 05/24/2024 96 (L)  98 - 107 mmol/L Final    CO2 05/24/2024 31  21 - 32 mmol/L Final    Anion Gap 05/24/2024 10  7 - 16 mmol/L Final    Glucose 05/24/2024 94  74 - 106 mg/dL Final    BUN 05/24/2024 19 (H)  7 - 18 mg/dL Final    Creatinine 05/24/2024 1.16  0.70 - 1.30 mg/dL Final    BUN/Creatinine Ratio 05/24/2024 16  6 - 20 Final    Calcium 05/24/2024 9.5  8.5 - 10.1 mg/dL Final    Total Protein 05/24/2024 7.1  6.4 - 8.2 g/dL Final    Albumin 05/24/2024 3.3 (L)  3.5 - 5.0 g/dL Final    Globulin 05/24/2024 3.8  2.0 - 4.0 g/dL Final    A/G Ratio 05/24/2024 0.9   Final    Bilirubin, Total 05/24/2024 0.5  >0.0 - 1.2 mg/dL Final    Alk Phos 05/24/2024 89  45 - 115 U/L Final    ALT 05/24/2024 21  16 - 61 U/L Final    AST 05/24/2024 30  15 - 37 U/L Final    eGFR 05/24/2024 62  >=60 mL/min/1.73m2 Final    PT 05/24/2024 15.1 (H)  11.7 - 14.7 seconds Final    INR 05/24/2024 1.20  <=3.30 Final    PTT 05/24/2024 30.6  25.2 - 37.3 seconds Final    Magnesium 05/24/2024 1.6 (L)  1.7 - 2.3 mg/dL Final    Color, UA 05/24/2024 Yellow  Colorless, Straw, Yellow, Light Yellow, Dark Yellow Final    Clarity, UA 05/24/2024 Clear  Clear Final    pH, UA 05/24/2024 6.0  5.0 to 8.0 pH Units Final    Leukocytes, UA 05/24/2024 Negative  Negative Final    Nitrites, UA  05/24/2024 Negative  Negative Final    Protein, UA 05/24/2024 10 (A)  Negative Final    Glucose, UA 05/24/2024 Normal  Normal mg/dL Final    Ketones, UA 05/24/2024 Negative  Negative mg/dL Final    Urobilinogen, UA 05/24/2024 Normal  0.2, 1.0, Normal mg/dL Final    Bilirubin, UA 05/24/2024 Negative  Negative Final    Blood, UA 05/24/2024 Negative  Negative Final    Specific Gravity, UA 05/24/2024 1.019  <=1.030 Final    QRS Duration 05/24/2024 76  ms Final    OHS QTC Calculation 05/24/2024 395  ms Final    Troponin I High Sensitivity 05/24/2024 52.3  <=60.4 pg/mL Final    WBC 05/24/2024 6.36  4.50 - 11.00 K/uL Final    RBC 05/24/2024 3.90 (L)  4.60 - 6.20 M/uL Final    Hemoglobin 05/24/2024 11.9 (L)  13.5 - 18.0 g/dL Final    Hematocrit 05/24/2024 37.1 (L)  40.0 - 54.0 % Final    MCV 05/24/2024 95.1  80.0 - 96.0 fL Final    MCH 05/24/2024 30.5  27.0 - 31.0 pg Final    MCHC 05/24/2024 32.1  32.0 - 36.0 g/dL Final    RDW 05/24/2024 13.2  11.5 - 14.5 % Final    Platelet Count 05/24/2024 218  150 - 400 K/uL Final    MPV 05/24/2024 9.5  9.4 - 12.4 fL Final    Neutrophils % 05/24/2024 55.5  53.0 - 65.0 % Final    Lymphocytes % 05/24/2024 25.6 (L)  27.0 - 41.0 % Final    Monocytes % 05/24/2024 12.6 (H)  2.0 - 6.0 % Final    Eosinophils % 05/24/2024 5.3 (H)  1.0 - 4.0 % Final    Basophils % 05/24/2024 0.5  0.0 - 1.0 % Final    Immature Granulocytes % 05/24/2024 0.5 (H)  0.0 - 0.4 % Final    nRBC, Auto 05/24/2024 0.0  <=0.0 % Final    Neutrophils, Abs 05/24/2024 3.53  1.80 - 7.70 K/uL Final    Lymphocytes, Absolute 05/24/2024 1.63  1.00 - 4.80 K/uL Final    Monocytes, Absolute 05/24/2024 0.80  0.00 - 0.80 K/uL Final    Eosinophils, Absolute 05/24/2024 0.34  0.00 - 0.50 K/uL Final    Basophils, Absolute 05/24/2024 0.03  0.00 - 0.20 K/uL Final    Immature Granulocytes, Absolute 05/24/2024 0.03  0.00 - 0.04 K/uL Final    nRBC, Absolute 05/24/2024 0.00  <=0.00 x10e3/uL Final    Diff Type 05/24/2024 Auto   Final   Admission on  04/20/2024, Discharged on 04/20/2024   Component Date Value Ref Range Status    Sodium 04/20/2024 136  136 - 145 mmol/L Final    Potassium 04/20/2024 4.1  3.5 - 5.1 mmol/L Final    Chloride 04/20/2024 102  98 - 107 mmol/L Final    CO2 04/20/2024 31  21 - 32 mmol/L Final    Anion Gap 04/20/2024 7  7 - 16 mmol/L Final    Glucose 04/20/2024 113 (H)  74 - 106 mg/dL Final    BUN 04/20/2024 16  7 - 18 mg/dL Final    Creatinine 04/20/2024 1.18  0.70 - 1.30 mg/dL Final    BUN/Creatinine Ratio 04/20/2024 14  6 - 20 Final    Calcium 04/20/2024 9.5  8.5 - 10.1 mg/dL Final    Total Protein 04/20/2024 7.3  6.4 - 8.2 g/dL Final    Albumin 04/20/2024 3.4 (L)  3.5 - 5.0 g/dL Final    Globulin 04/20/2024 3.9  2.0 - 4.0 g/dL Final    A/G Ratio 04/20/2024 0.9   Final    Bilirubin, Total 04/20/2024 0.5  >0.0 - 1.2 mg/dL Final    Alk Phos 04/20/2024 101  45 - 115 U/L Final    ALT 04/20/2024 19  16 - 61 U/L Final    AST 04/20/2024 25  15 - 37 U/L Final    eGFR 04/20/2024 61  >=60 mL/min/1.73m2 Final    Troponin I High Sensitivity 04/20/2024 37.2  <=60.4 pg/mL Final    ProBNP 04/20/2024 534 (H)  1 - 450 pg/mL Final    QRS Duration 04/20/2024 76  ms Final    OHS QTC Calculation 04/20/2024 397  ms Final    WBC 04/20/2024 7.09  4.50 - 11.00 K/uL Final    RBC 04/20/2024 3.61 (L)  4.60 - 6.20 M/uL Final    Hemoglobin 04/20/2024 11.0 (L)  13.5 - 18.0 g/dL Final    Hematocrit 04/20/2024 34.3 (L)  40.0 - 54.0 % Final    MCV 04/20/2024 95.0  80.0 - 96.0 fL Final    MCH 04/20/2024 30.5  27.0 - 31.0 pg Final    MCHC 04/20/2024 32.1  32.0 - 36.0 g/dL Final    RDW 04/20/2024 12.7  11.5 - 14.5 % Final    Platelet Count 04/20/2024 212  150 - 400 K/uL Final    MPV 04/20/2024 9.6  9.4 - 12.4 fL Final    Neutrophils % 04/20/2024 54.3  53.0 - 65.0 % Final    Lymphocytes % 04/20/2024 28.6  27.0 - 41.0 % Final    Monocytes % 04/20/2024 10.9 (H)  2.0 - 6.0 % Final    Eosinophils % 04/20/2024 5.4 (H)  1.0 - 4.0 % Final    Basophils % 04/20/2024 0.4  0.0 - 1.0 %  Final    Immature Granulocytes % 04/20/2024 0.4  0.0 - 0.4 % Final    nRBC, Auto 04/20/2024 0.0  <=0.0 % Final    Neutrophils, Abs 04/20/2024 3.85  1.80 - 7.70 K/uL Final    Lymphocytes, Absolute 04/20/2024 2.03  1.00 - 4.80 K/uL Final    Monocytes, Absolute 04/20/2024 0.77  0.00 - 0.80 K/uL Final    Eosinophils, Absolute 04/20/2024 0.38  0.00 - 0.50 K/uL Final    Basophils, Absolute 04/20/2024 0.03  0.00 - 0.20 K/uL Final    Immature Granulocytes, Absolute 04/20/2024 0.03  0.00 - 0.04 K/uL Final    nRBC, Absolute 04/20/2024 0.00  <=0.00 x10e3/uL Final    Diff Type 04/20/2024 Manual   Final    Segmented Neutrophils, Man % 04/20/2024 59  50 - 62 % Final    Bands, Man % 04/20/2024 1  1 - 5 % Final    Lymphocytes, Man % 04/20/2024 21 (L)  27 - 41 % Final    Monocytes, Man % 04/20/2024 10 (H)  2 - 6 % Final    Eosinophils, Man % 04/20/2024 8 (H)  1 - 4 % Final    Basophils, Man % 04/20/2024 1  0 - 1 % Final    Platelet Morphology 04/20/2024 Normal  Normal Final    RBC Morphology 04/20/2024 Normal   Final         Orders Placed This Encounter   Procedures    X-Ray Cervical Spine AP Lat with Flex Ex     Standing Status:   Future     Standing Expiration Date:   11/1/2024     Order Specific Question:   Does the patient have a neck collar or brace on?     Answer:   No     Order Specific Question:   May the Radiologist modify the order per protocol to meet the clinical needs of the patient?     Answer:   Yes     Order Specific Question:   Release to patient     Answer:   Immediate    X-ray Shoulder 2 or More Views Right     Standing Status:   Future     Standing Expiration Date:   8/1/2025     Order Specific Question:   Does the patient have a splint or a brace?     Answer:   No     Order Specific Question:   Does the patient have a cast?     Answer:   No     Order Specific Question:   May the Radiologist modify the order per protocol to meet the clinical needs of the patient?     Answer:   Yes     Order Specific Question:    Release to patient     Answer:   Immediate       Requested Prescriptions     Signed Prescriptions Disp Refills    naloxone (NARCAN) 4 mg/actuation Spry 1 each 0     Sig: Use as directed spraying 1 time (4 mg total) in one nostril as one dose... then  Call 911, may repeat  with 1 spray in the alternate nostril in  2-3 minutes if needed    HYDROcodone-acetaminophen (NORCO) 7.5-325 mg per tablet 60 tablet 0     Sig: Take 1 tablet by mouth every 12 (twelve) hours as needed for pain... May cause drowsiness       Assessment:     1. Chronic right shoulder pain    2. Cervical radiculopathy         A's of Opioid Risk Assessment  Activity:Patient can perform ADL.   Analgesia:Patients pain is partially controlled by current medication. Patient has tried OTC medications such as Tylenol and Ibuprofen with out relief.   Adverse Effects: Patient denies constipation or sedation.  Aberrant Behavior:  reviewed with no aberrant drug seeking/taking behavior.  Overdose reversal drug naloxone discussed    Drug screen reviewed      MRI lumbar spine [facility] May 6, 2019  L3/4 diffuse disc bulge bilateral neuroforaminal narrowing moderate spinal canal narrowing  L4/5 moderate spinal canal narrowing  partial left-sided laminectomy possible pseudomeningocele, postoperative seroma, fluid collection  L5/S1 moderate bilateral neuroforaminal narrowing      Plan:    Narcan July 2024    [facility] authorization    COPD  Continuous nasal cannula O2    Follows orthopedics [facility]  History sarcoma left elbow    Anticoagulate Eliquis      Last refill hydrocodone 5, 30 tablets June 25, 2024    Patient uses wheelchair for mobility    Presents today complaint severe joint back     requesting refill hydrocodone    Uses wheelchair for mobility    Was seen in the emergency room today due to joint back pain [facility]      Presents today complaining of multiple year history neck pain back pain right arm pain  numbness and tingling right shoulder pain multiple joint pain ongoing for many years     Right shoulder pain getting worse since January 2024 he had a fall at home injured his right shoulder     Very poor surgical/procedure candidate    Increase hydrocodone 7.51 p.o. q.12 hours     Patient states he will use this medication sparingly    Will obtain x-ray right shoulder and cervical spine today    Continue activity as directed    Follow-up 1 month    Dr. Asencio June 2025    Bring original prescription medication bottles/container/box with labels to each visit

## 2024-08-02 ENCOUNTER — HOSPITAL ENCOUNTER (OUTPATIENT)
Dept: RADIOLOGY | Facility: HOSPITAL | Age: 84
Discharge: HOME | End: 2024-08-02
Attending: PHYSICIAN ASSISTANT
Payer: MEDICARE

## 2024-08-02 DIAGNOSIS — M25.511 CHRONIC RIGHT SHOULDER PAIN: Chronic | ICD-10-CM

## 2024-08-02 DIAGNOSIS — G89.29 CHRONIC RIGHT SHOULDER PAIN: Chronic | ICD-10-CM

## 2024-08-02 DIAGNOSIS — M54.12 CERVICAL RADICULOPATHY: Chronic | ICD-10-CM

## 2024-08-02 PROCEDURE — 72050 X-RAY EXAM NECK SPINE 4/5VWS: CPT | Mod: 26,,, | Performed by: RADIOLOGY

## 2024-08-02 PROCEDURE — 72050 X-RAY EXAM NECK SPINE 4/5VWS: CPT | Mod: TC

## 2024-08-02 PROCEDURE — 73030 X-RAY EXAM OF SHOULDER: CPT | Mod: TC,RT

## 2024-08-02 PROCEDURE — 73030 X-RAY EXAM OF SHOULDER: CPT | Mod: 26,RT,, | Performed by: RADIOLOGY

## 2024-08-20 NOTE — DISCHARGE INSTRUCTIONS
Putting out a prescription of budesonide for the inhaler and for the nebulizer.  Would rather you take the nebulizer but check around with multiple pharmacies and try to find wanted he was that your insurance will pay for in the true pharmacy has a version available that your insurance will pay for    Follow back up with your primary care and with Dr. Alicea.    Start prescription prednisone    Keep using your regular medications including your breathing treatments  
Previously Declined (within the last year)

## (undated) DEVICE — GLOVE SURGICAL PROTEXIS PI CLASSIC SIZE 6.5

## (undated) DEVICE — GLOVE SURGICAL PROTEXIS PI BLUE SIZE 6.5

## (undated) DEVICE — SUTURE PDS II 5-0 P-3 CLEAR 18IN

## (undated) DEVICE — PADDING CAST 2IN STERILE

## (undated) DEVICE — BANDAGE ELASTIC 2IN X 5.8YRD NS SELF CLOSE

## (undated) DEVICE — CDS EXTREMITY

## (undated) DEVICE — NEEDLE ECLIPSE 25GX1IN SAFETY

## (undated) DEVICE — SYRINGE 10-12CC LURE -LOK TIP

## (undated) DEVICE — SOL IRRIGATION SALINE 0.9% 1000ML BOTTLE

## (undated) DEVICE — APPLICATOR CHLORAPREP HI-LITE TINTED ORANGE 26ML

## (undated) DEVICE — STOCKINETTE COTTON 4FTX48IN 2-PLY OFF WHITE STERILE

## (undated) DEVICE — GLOVE SURGICAL PROTEXIS PI CLASSIC SIZE 8.0

## (undated) DEVICE — SUTURE ETHILON 5-0 18 BLK P-3

## (undated) DEVICE — SLING ARM VOGUE LARGE W/LOGO